# Patient Record
Sex: FEMALE | Race: WHITE | NOT HISPANIC OR LATINO | Employment: FULL TIME | URBAN - METROPOLITAN AREA
[De-identification: names, ages, dates, MRNs, and addresses within clinical notes are randomized per-mention and may not be internally consistent; named-entity substitution may affect disease eponyms.]

---

## 2017-01-09 ENCOUNTER — OFFICE VISIT (OUTPATIENT)
Dept: OBGYN | Facility: CLINIC | Age: 37
End: 2017-01-09
Payer: COMMERCIAL

## 2017-01-09 VITALS — DIASTOLIC BLOOD PRESSURE: 80 MMHG | SYSTOLIC BLOOD PRESSURE: 124 MMHG | TEMPERATURE: 98.5 F | HEIGHT: 67 IN

## 2017-01-09 DIAGNOSIS — B97.7 HIGH RISK HPV INFECTION: Primary | ICD-10-CM

## 2017-01-09 PROCEDURE — 57452 EXAM OF CERVIX W/SCOPE: CPT | Performed by: OBSTETRICS & GYNECOLOGY

## 2017-01-09 NOTE — PROGRESS NOTES
Tati Hamilton is a 36 year old female who presents for repeat colposcopy.   Normal pap, but positive HPV    Past Medical History   Diagnosis Date     HTN, goal below 140/90      Depressive disorder, not elsewhere classified      Calculus of kidney      Multiple sclerosis (H) Dx in 2001     Lung nodules May 2010     Needs f/u CT Nov 2010     ESTEPHANIA (generalised anxiety disorder)      Vitamin D deficiencies      PONV (postoperative nausea and vomiting)      High risk HPV infection 3/20/15,10/17/16     Not HPV 16 or 18, HR, Not 16 or 18     Family History   Problem Relation Age of Onset     Hypertension Father      Prostate Cancer Maternal Grandfather      CEREBROVASCULAR DISEASE Maternal Grandmother      HEART DISEASE Paternal Grandfather      Gallbladder Disease Mother      Other - See Comments Mother      varicose veins             Patient's last menstrual period was 12/23/2016.      History   Smoking status     Current Every Day Smoker -- 0.50 packs/day for 4 years     Types: Cigarettes     Last Attempt to Quit: 04/28/2007   Smokeless tobacco     Never Used     Comment: socially       Allergies as of 01/09/2017 - reviewed 01/09/2017   Allergen Reaction Noted     No known drug allergies  09/20/2002       PROCEDURE:  Before the procedure, it was ensured that the patient was educated regarding the nature of her findings to date, the implications of them, and what was to be done. She has been made aware of the role of HPV, the natural history of infection, ways to minimize her future risk, the effect of HPV on the cervix, and treatment options available should they be indicated. The pathophysiology of the cervix, including a discussion of squamous vs. endometrial cells, and squamous metaplasia have all been reviewed, using illustrations and sketches. The details of the colposcopic procedure were reviewed, as well as the risks of missed diagnoses, pain, infection and bleeding. All questions were answered before  proceeding, and informed consent was therefore obtained.      Pap repeated?:  No  SCJ seen?:  yes  Endocervical speculum needed?:  No  ECC done?:  No  EndoPap done?:  NO  Lugol's solution used?:  No  Satisfactory examination?:  yes    Vaginal vault: normal to cursory inspection   Urethra normal?:  yes  Labia normal?:  yes  Perineum normal?:  yes  Rectum normal?:  yes    FINDINGS:  Please see image   Cervix: no visible lesions  Procedure: biopsies taken (not including ECC): 0.    Procedure summary: Patient tolerated procedure well     Assessment: Normal exam without visible pathology      Plan: repeat pap smear in 6-12 months

## 2017-01-09 NOTE — NURSING NOTE
"Chief Complaint   Patient presents with     Colposcopy       Initial /80 mmHg  Temp(Src) 98.5  F (36.9  C) (Oral)  Ht 5' 7\" (1.702 m)  LMP 12/23/2016 Estimated body mass index is 29.12 kg/(m^2) as calculated from the following:    Height as of this encounter: 5' 7\" (1.702 m).    Weight as of 10/26/16: 186 lb (84.369 kg).  BP completed using cuff size: valery Kimbrough CMA      "

## 2017-02-14 DIAGNOSIS — F41.1 GENERALIZED ANXIETY DISORDER: Primary | ICD-10-CM

## 2017-02-14 RX ORDER — SERTRALINE HYDROCHLORIDE 100 MG/1
100 TABLET, FILM COATED ORAL DAILY
Qty: 90 TABLET | Refills: 0 | Status: SHIPPED | OUTPATIENT
Start: 2017-02-14 | End: 2017-10-19

## 2017-02-14 NOTE — TELEPHONE ENCOUNTER
Pt calling for refill on Zoloft.  Asking for 100mg tabs take 1 tab daily (chart states 50mg tabs take 2 tabs daily).    Zoloft     Last Written Prescription Date: 10-17-16  Last Fill Quantity: 90, # refills: 1  Last Office Visit with [facility] care provider:  6-17-16        Last PHQ-9 score on record=   PHQ-9 SCORE 3/18/2014   Total Score 2       Medication is being filled for 1 time refill only due to:  Patient needs to be seen because due for 6 mo f/u..     Pt informed due for OV.

## 2017-04-25 ENCOUNTER — TELEPHONE (OUTPATIENT)
Dept: INTERNAL MEDICINE | Facility: CLINIC | Age: 37
End: 2017-04-25

## 2017-04-25 DIAGNOSIS — Z00.00 ENCOUNTER FOR ROUTINE ADULT HEALTH EXAMINATION: ICD-10-CM

## 2017-04-25 DIAGNOSIS — E78.5 HYPERLIPIDEMIA LDL GOAL <100: ICD-10-CM

## 2017-04-25 DIAGNOSIS — I10 HTN, GOAL BELOW 140/90: Primary | ICD-10-CM

## 2017-04-25 DIAGNOSIS — E55.9 VITAMIN D DEFICIENCY: ICD-10-CM

## 2017-04-25 NOTE — TELEPHONE ENCOUNTER
Per Jose-enter orders for-      CBC  CMP  FLP direct LDL  TSH/T4   Microalbumin  Vit D    Orders entered-Pt aware

## 2017-05-02 DIAGNOSIS — I10 HTN, GOAL BELOW 140/90: ICD-10-CM

## 2017-05-02 RX ORDER — ATENOLOL 25 MG/1
25 TABLET ORAL DAILY
Qty: 90 TABLET | Refills: 0 | Status: SHIPPED | OUTPATIENT
Start: 2017-05-02 | End: 2017-08-04

## 2017-05-02 NOTE — TELEPHONE ENCOUNTER
Pt requesting refill on Atenolol      Last OV-6/17/16      Pt aware she is due for labs, and due for June appt

## 2017-06-25 DIAGNOSIS — E55.9 VITAMIN D DEFICIENCY: ICD-10-CM

## 2017-06-26 RX ORDER — ERGOCALCIFEROL 1.25 MG/1
CAPSULE, LIQUID FILLED ORAL
Qty: 12 CAPSULE | Refills: 2 | OUTPATIENT
Start: 2017-06-26

## 2017-07-13 ENCOUNTER — TELEPHONE (OUTPATIENT)
Dept: INTERNAL MEDICINE | Facility: CLINIC | Age: 37
End: 2017-07-13

## 2017-07-13 DIAGNOSIS — E66.3 OVERWEIGHT: Primary | ICD-10-CM

## 2017-07-13 NOTE — TELEPHONE ENCOUNTER
Pt calling.  Asking for rx for Topamax for weight loss.    Last OV 6-17-16    Please advise, thanks.

## 2017-07-14 RX ORDER — TOPIRAMATE 25 MG/1
TABLET, FILM COATED ORAL
Qty: 90 TABLET | Refills: 1 | Status: SHIPPED | OUTPATIENT
Start: 2017-07-14 | End: 2017-08-15 | Stop reason: DRUGHIGH

## 2017-08-04 DIAGNOSIS — I10 HTN, GOAL BELOW 140/90: ICD-10-CM

## 2017-08-04 DIAGNOSIS — Z00.00 ENCOUNTER FOR ROUTINE ADULT HEALTH EXAMINATION: ICD-10-CM

## 2017-08-04 DIAGNOSIS — E78.5 HYPERLIPIDEMIA LDL GOAL <100: ICD-10-CM

## 2017-08-04 DIAGNOSIS — E55.9 VITAMIN D DEFICIENCY: ICD-10-CM

## 2017-08-04 LAB
ALBUMIN SERPL-MCNC: 3.5 G/DL (ref 3.4–5)
ALP SERPL-CCNC: 98 U/L (ref 40–150)
ALT SERPL W P-5'-P-CCNC: 24 U/L (ref 0–50)
ANION GAP SERPL CALCULATED.3IONS-SCNC: 8 MMOL/L (ref 3–14)
AST SERPL W P-5'-P-CCNC: 14 U/L (ref 0–45)
BILIRUB SERPL-MCNC: 0.3 MG/DL (ref 0.2–1.3)
BUN SERPL-MCNC: 10 MG/DL (ref 7–30)
CALCIUM SERPL-MCNC: 9.3 MG/DL (ref 8.5–10.1)
CHLORIDE SERPL-SCNC: 106 MMOL/L (ref 94–109)
CHOLEST SERPL-MCNC: 178 MG/DL
CO2 SERPL-SCNC: 22 MMOL/L (ref 20–32)
CREAT SERPL-MCNC: 0.79 MG/DL (ref 0.52–1.04)
CREAT UR-MCNC: 144 MG/DL
ERYTHROCYTE [DISTWIDTH] IN BLOOD BY AUTOMATED COUNT: 15 % (ref 10–15)
GFR SERPL CREATININE-BSD FRML MDRD: 82 ML/MIN/1.7M2
GLUCOSE SERPL-MCNC: 111 MG/DL (ref 70–99)
HCT VFR BLD AUTO: 35.8 % (ref 35–47)
HDLC SERPL-MCNC: 51 MG/DL
HGB BLD-MCNC: 11.2 G/DL (ref 11.7–15.7)
LDLC SERPL CALC-MCNC: 79 MG/DL
MCH RBC QN AUTO: 28.4 PG (ref 26.5–33)
MCHC RBC AUTO-ENTMCNC: 31.3 G/DL (ref 31.5–36.5)
MCV RBC AUTO: 91 FL (ref 78–100)
MICROALBUMIN UR-MCNC: 7 MG/L
MICROALBUMIN/CREAT UR: 4.78 MG/G CR (ref 0–25)
NONHDLC SERPL-MCNC: 127 MG/DL
PLATELET # BLD AUTO: 298 10E9/L (ref 150–450)
POTASSIUM SERPL-SCNC: 3.7 MMOL/L (ref 3.4–5.3)
PROT SERPL-MCNC: 7.5 G/DL (ref 6.8–8.8)
RBC # BLD AUTO: 3.94 10E12/L (ref 3.8–5.2)
SODIUM SERPL-SCNC: 136 MMOL/L (ref 133–144)
TRIGL SERPL-MCNC: 239 MG/DL
TSH SERPL DL<=0.005 MIU/L-ACNC: 1.8 MU/L (ref 0.4–4)
WBC # BLD AUTO: 6.5 10E9/L (ref 4–11)

## 2017-08-04 PROCEDURE — 82043 UR ALBUMIN QUANTITATIVE: CPT | Performed by: INTERNAL MEDICINE

## 2017-08-04 PROCEDURE — 82306 VITAMIN D 25 HYDROXY: CPT | Mod: GZ | Performed by: INTERNAL MEDICINE

## 2017-08-04 PROCEDURE — 80053 COMPREHEN METABOLIC PANEL: CPT | Performed by: INTERNAL MEDICINE

## 2017-08-04 PROCEDURE — 85027 COMPLETE CBC AUTOMATED: CPT | Performed by: INTERNAL MEDICINE

## 2017-08-04 PROCEDURE — 84443 ASSAY THYROID STIM HORMONE: CPT | Performed by: INTERNAL MEDICINE

## 2017-08-04 PROCEDURE — 80061 LIPID PANEL: CPT | Performed by: INTERNAL MEDICINE

## 2017-08-04 PROCEDURE — 36415 COLL VENOUS BLD VENIPUNCTURE: CPT | Performed by: INTERNAL MEDICINE

## 2017-08-04 RX ORDER — ATENOLOL 25 MG/1
25 TABLET ORAL DAILY
Qty: 90 TABLET | Refills: 0 | Status: SHIPPED | OUTPATIENT
Start: 2017-08-04 | End: 2017-08-15

## 2017-08-04 NOTE — TELEPHONE ENCOUNTER
Atenolol      Last Written Prescription Date: 5-2-17  Last Fill Quantity: 90, # refills: 0    Last Office Visit with G, P or [facility] prescribing provider:  6-17-16   Future Office Visit:        BP Readings from Last 3 Encounters:   01/09/17 124/80   10/26/16 (!) 145/96   10/17/16 122/88       Pt will be out of med soon.     Medication is being filled for 1 time refill only due to:  pending lab results.

## 2017-08-07 LAB — DEPRECATED CALCIDIOL+CALCIFEROL SERPL-MC: 21 UG/L (ref 20–75)

## 2017-08-15 ENCOUNTER — OFFICE VISIT (OUTPATIENT)
Dept: INTERNAL MEDICINE | Facility: CLINIC | Age: 37
End: 2017-08-15
Payer: COMMERCIAL

## 2017-08-15 VITALS
TEMPERATURE: 98.7 F | DIASTOLIC BLOOD PRESSURE: 70 MMHG | HEIGHT: 67 IN | SYSTOLIC BLOOD PRESSURE: 110 MMHG | WEIGHT: 183.2 LBS | BODY MASS INDEX: 28.75 KG/M2 | HEART RATE: 90 BPM | OXYGEN SATURATION: 98 %

## 2017-08-15 DIAGNOSIS — R73.9 BLOOD SUGAR INCREASED: ICD-10-CM

## 2017-08-15 DIAGNOSIS — E66.3 OVERWEIGHT: ICD-10-CM

## 2017-08-15 DIAGNOSIS — E55.9 VITAMIN D DEFICIENCY: ICD-10-CM

## 2017-08-15 DIAGNOSIS — D64.9 ANEMIA, UNSPECIFIED TYPE: ICD-10-CM

## 2017-08-15 DIAGNOSIS — I10 HTN, GOAL BELOW 140/90: Primary | ICD-10-CM

## 2017-08-15 PROCEDURE — 99214 OFFICE O/P EST MOD 30 MIN: CPT | Performed by: INTERNAL MEDICINE

## 2017-08-15 RX ORDER — ERGOCALCIFEROL 1.25 MG/1
50000 CAPSULE, LIQUID FILLED ORAL WEEKLY
Qty: 12 CAPSULE | Refills: 1 | Status: SHIPPED | OUTPATIENT
Start: 2017-08-15 | End: 2020-05-28

## 2017-08-15 RX ORDER — LISINOPRIL 20 MG/1
20 TABLET ORAL DAILY
Qty: 90 TABLET | Refills: 3 | Status: SHIPPED | OUTPATIENT
Start: 2017-08-15 | End: 2018-09-06

## 2017-08-15 RX ORDER — ATENOLOL 25 MG/1
25 TABLET ORAL DAILY
Qty: 90 TABLET | Refills: 1 | Status: SHIPPED | OUTPATIENT
Start: 2017-08-15 | End: 2018-05-15

## 2017-08-15 RX ORDER — TOPIRAMATE 25 MG/1
50 TABLET, FILM COATED ORAL 2 TIMES DAILY
Qty: 360 TABLET | Refills: 1 | Status: SHIPPED | OUTPATIENT
Start: 2017-08-15 | End: 2018-03-28

## 2017-08-15 ASSESSMENT — ANXIETY QUESTIONNAIRES
3. WORRYING TOO MUCH ABOUT DIFFERENT THINGS: SEVERAL DAYS
GAD7 TOTAL SCORE: 8
7. FEELING AFRAID AS IF SOMETHING AWFUL MIGHT HAPPEN: SEVERAL DAYS
6. BECOMING EASILY ANNOYED OR IRRITABLE: MORE THAN HALF THE DAYS
5. BEING SO RESTLESS THAT IT IS HARD TO SIT STILL: SEVERAL DAYS
1. FEELING NERVOUS, ANXIOUS, OR ON EDGE: SEVERAL DAYS
2. NOT BEING ABLE TO STOP OR CONTROL WORRYING: SEVERAL DAYS

## 2017-08-15 ASSESSMENT — PATIENT HEALTH QUESTIONNAIRE - PHQ9: 5. POOR APPETITE OR OVEREATING: SEVERAL DAYS

## 2017-08-15 NOTE — PROGRESS NOTES
Patient's instructions / PLAN:                                                        Plan:  1. Vitamin D 26366 units weekly   2. topamax 50 mg twice a day  3. Diet and exercise   4. Please make a lab appointment for fasting labs in Feb   5. Please make an appointment few days after the labs to discuss about the results.   6. Iron ( Vitron) take 1 tab twice a week        ASSESSMENT & PLAN:                                                      (I10) HTN, goal below 140/90  (primary encounter diagnosis)  Comment: Controlled    Plan: vitamin D (ERGOCALCIFEROL) 24953 UNIT capsule,         topiramate (TOPAMAX) 25 MG tablet, lisinopril         (PRINIVIL/ZESTRIL) 20 MG tablet, atenolol         (TENORMIN) 25 MG tablet, Hemoglobin A1c,         Comprehensive metabolic panel, CBC with         platelets, Lipid panel reflex to direct LDL,         Vitamin D Deficiency        Continue same meds, same doses for now     (E66.3) Overweight  Comment: tolerates Topamax   Plan: vitamin D (ERGOCALCIFEROL) 94893 UNIT capsule,         topiramate (TOPAMAX) 25 MG tablet, lisinopril         (PRINIVIL/ZESTRIL) 20 MG tablet, atenolol         (TENORMIN) 25 MG tablet, Hemoglobin A1c,         Comprehensive metabolic panel, CBC with         platelets, Lipid panel reflex to direct LDL,         Vitamin D Deficiency            (E55.9) Vitamin D deficiency  Comment:   Plan: vitamin D (ERGOCALCIFEROL) 99990 UNIT capsule,         topiramate (TOPAMAX) 25 MG tablet, lisinopril         (PRINIVIL/ZESTRIL) 20 MG tablet, atenolol         (TENORMIN) 25 MG tablet, Hemoglobin A1c,         Comprehensive metabolic panel, CBC with         platelets, Lipid panel reflex to direct LDL,         Vitamin D Deficiency            (R73.09) Blood sugar increased  Comment:   Plan: vitamin D (ERGOCALCIFEROL) 58310 UNIT capsule,         topiramate (TOPAMAX) 25 MG tablet, lisinopril         (PRINIVIL/ZESTRIL) 20 MG tablet, atenolol         (TENORMIN) 25 MG tablet, Hemoglobin A1c,  "        Comprehensive metabolic panel, CBC with         platelets, Lipid panel reflex to direct LDL,         Vitamin D Deficiency        Diet and exercise     (D64.9) Anemia, unspecified type  Comment: sec heavy periods   Plan: Iron and iron binding capacity, Ferritin,         Vitamin B12, Folate        as above        Chief Complaint:                                                      Follow up chronic medical problems        SUBJECTIVE:                                                    History of present illness     No side effects from Topamax. Helps with the weight.    Mild anemia - heavy periods    High glucose & TG - diet and exercise     Low vit D - resume 32459     HTN - Controlled      ROS:                                                      ROS: negative for fever, chills, cough, wheezes, chest pain, shortness of breath, vomiting, abdominal pain, leg swelling     A 10-point review of systems was obtained.  Those pertinent are above and in the in the Subjective section.  The rest of the systems are negative.        OBJECTIVE:                                                    Physical Exam :    Blood pressure 110/70, pulse 90, temperature 98.7  F (37.1  C), temperature source Oral, height 5' 7\" (1.702 m), weight 183 lb 3.2 oz (83.1 kg), SpO2 98 %, not currently breastfeeding.   NAD, appears comfortable  Skin: no rashes   HEENT: PERRLA, EOMI, pink conjunctiva, anicteric sclerae, bilateral tympanic membranes are clinically normal, oropharynx is normal color  Neck: supple, no JVD,  No thyroidmegaly. Lymph nodes nonpalpable cervical and supraclavicular.  Chest: clear to auscultation bilaterally, good respiratory effort  Heart: S1 S2, RRR, no mgr appreciated  Abdomen: soft, not tender, no hepatosplenomegaly or masses appreciated, no abdominal bruit, present bowel sounds  Extremities: no edema,   Neurologic: A, Ox3, no focal signs appreciated    PMHx: reviewed  Past Medical History:   Diagnosis Date     " Calculus of kidney      Depressive disorder, not elsewhere classified      ESTEPHANIA (generalised anxiety disorder)      High risk HPV infection 3/20/15,10/17/16    Not HPV 16 or 18, HR, Not 16 or 18     HTN, goal below 140/90      Lung nodules May 2010    Needs f/u CT Nov 2010     Multiple sclerosis (H) Dx in 2001     PONV (postoperative nausea and vomiting)      Vitamin D deficiencies       PSHx: reviewed  Past Surgical History:   Procedure Laterality Date     C NONSPECIFIC PROCEDURE      2 procedures for kidney stones     HC COLP CERVIX/UPPER VAGINA W LOOP ELEC BX CERVIX       HC REMOVAL OF TONSILS,<11 Y/O      Tonsils <12y.o.     LAPAROSCOPIC CHOLECYSTECTOMY  5/8/2014    Procedure: LAPAROSCOPIC CHOLECYSTECTOMY;  Surgeon: Sona Giraldo MD;  Location:  OR        Meds: reviewed  Current Outpatient Prescriptions   Medication Sig Dispense Refill     atenolol (TENORMIN) 25 MG tablet Take 1 tablet (25 mg) by mouth daily 90 tablet 0     topiramate (TOPAMAX) 25 MG tablet One tablet daily for two weeks, then two tablets daily for two weeks, then three tablets daily 90 tablet 1     sertraline (ZOLOFT) 100 MG tablet Take 1 tablet (100 mg) by mouth daily 90 tablet 0     lisinopril (PRINIVIL,ZESTRIL) 20 MG tablet Take 1 tablet (20 mg) by mouth daily 90 tablet 3     omeprazole (PRILOSEC) 40 MG capsule Take 1 capsule (40 mg) by mouth daily Take 30-60 minutes before a meal. 90 capsule 1     order for DME Equipment being ordered: [employer] DREAM STATION WITH HH AND MIRAGE FX FOR HER NASAL MASK WITH CHINSTRAP.  PRESSURE 7-15 CM.       [DISCONTINUED] sertraline (ZOLOFT) 50 MG tablet Take 2 tablets (100 mg) by mouth daily 90 tablet 1       Soc Hx: reviewed  Fam Hx: reviewed          Gely Garcia MD  Internal Medicine

## 2017-08-15 NOTE — NURSING NOTE
"No chief complaint on file.      Initial /70 (BP Location: Left arm, Patient Position: Chair, Cuff Size: Adult Large)  Pulse 90  Temp 98.7  F (37.1  C) (Oral)  Ht 5' 7\" (1.702 m)  Wt 183 lb 3.2 oz (83.1 kg)  SpO2 98%  Breastfeeding? No  BMI 28.69 kg/m2 Estimated body mass index is 28.69 kg/(m^2) as calculated from the following:    Height as of this encounter: 5' 7\" (1.702 m).    Weight as of this encounter: 183 lb 3.2 oz (83.1 kg).  Medication Reconciliation: complete   Cinthia Quintana CMA      "

## 2017-08-15 NOTE — PATIENT INSTRUCTIONS
Plan:  1. Vitamin D 12936 units weekly   2. topamax 50 mg twice a day  3. Diet and exercise   4. Please make a lab appointment for fasting labs in Feb   5. Please make an appointment few days after the labs to discuss about the results.   6. Iron ( Vitron) take 1 tab twice a week

## 2017-08-15 NOTE — MR AVS SNAPSHOT
After Visit Summary   8/15/2017    Tati Hamilton    MRN: [id number]           Patient Information     Date Of Birth          1980        Visit Information        Provider Department      8/15/2017 4:20 PM Gely Le MD [facility]        Today's Diagnoses     HTN, goal below 140/90    -  1    Overweight        Vitamin D deficiency        Blood sugar increased        Anemia, unspecified type          Care Instructions    Plan:  1. Vitamin D 56333 units weekly   2. topamax 50 mg twice a day  3. Diet and exercise   4. Please make a lab appointment for fasting labs in Feb   5. Please make an appointment few days after the labs to discuss about the results.   6. Iron ( Vitron) take 1 tab twice a week          Follow-ups after your visit        Future tests that were ordered for you today     Open Future Orders        Priority Expected Expires Ordered    Iron and iron binding capacity Routine  8/15/2018 8/15/2017    Ferritin Routine  8/15/2018 8/15/2017    Vitamin B12 Routine  8/15/2018 8/15/2017    Folate Routine  8/15/2018 8/15/2017    CBC with platelets Routine  8/15/2018 8/15/2017    Lipid panel reflex to direct LDL Routine  8/15/2018 8/15/2017    Vitamin D Deficiency Routine  8/15/2018 8/15/2017    Hemoglobin A1c Routine  8/15/2018 8/15/2017    Comprehensive metabolic panel Routine  8/15/2018 8/15/2017            Who to contact     If you have questions or need follow up information about today's clinic visit or your schedule please contact [facility] directly at [phone].  Normal or non-critical lab and imaging results will be communicated to you by MyChart, letter or phone within 4 business days after the clinic has received the results. If you do not hear from us within 7 days, please contact the clinic through MyChart or phone. If you have a critical or abnormal lab result, we will notify you by phone as soon as possible.  Submit  "refill requests through [employer] or call your pharmacy and they will forward the refill request to us. Please allow 3 business days for your refill to be completed.          Additional Information About Your Visit        [employer] Information     [employer] gives you secure access to your electronic health record. If you see a primary care provider, you can also send messages to your care team and make appointments. If you have questions, please call your primary care clinic.  If you do not have a primary care provider, please call [phone] and they will assist you.        Care EveryWhere ID     This is your Care EveryWhere ID. This could be used by other organizations to access your [facility] medical records  [id number]        Your Vitals Were     Pulse Temperature Height Pulse Oximetry Breastfeeding? BMI (Body Mass Index)    90 98.7  F (37.1  C) (Oral) 5' 7\" (1.702 m) 98% No 28.69 kg/m2       Blood Pressure from Last 3 Encounters:   08/15/17 110/70   01/09/17 124/80   10/26/16 (!) 145/96    Weight from Last 3 Encounters:   08/15/17 183 lb 3.2 oz (83.1 kg)   10/26/16 186 lb (84.4 kg)   10/17/16 182 lb (82.6 kg)                 Today's Medication Changes          These changes are accurate as of: 8/15/17  5:03 PM.  If you have any questions, ask your nurse or doctor.               These medicines have changed or have updated prescriptions.        Dose/Directions    sertraline 100 MG tablet   Commonly known as:  ZOLOFT   This may have changed:  Another medication with the same name was removed. Continue taking this medication, and follow the directions you see here.   Used for:  Generalized anxiety disorder        Dose:  100 mg   Take 1 tablet (100 mg) by mouth daily   Quantity:  90 tablet   Refills:  0       topiramate 25 MG tablet   Commonly known as:  TOPAMAX   This may have changed:    - how much to take  - how to take this  - when to take this  - additional instructions   Used for:  Overweight, Vitamin D " deficiency, HTN, goal below 140/90, Blood sugar increased        Dose:  50 mg   Take 2 tablets (50 mg) by mouth 2 times daily   Quantity:  360 tablet   Refills:  1            Where to get your medicines      These medications were sent to [facility] [place], [place] - [address]  [address] [facility] [address]     Phone:  [phone]     atenolol 25 MG tablet    lisinopril 20 MG tablet    topiramate 25 MG tablet    vitamin D 41797 UNIT capsule                Primary Care Provider Office Phone # Fax #    Gely Le [phone]       [address]  [facility] [address]        Equal Access to Services     GILBERTO HARRINGTON : Hadii nilton oliver hadasho Soomaali, waaxda luqadaha, qaybta kaalmada adeegyada, waxay cindyin haykatelyn ellison . So [facility] [phone].    ATENCIÓN: Si habla español, tiene a acosta disposición servicios gratuitos de asistencia lingüística. [facility] [phone].    We comply with applicable federal civil rights laws and [place] laws. We do not discriminate on the basis of race, color, national origin, age, disability sex, sexual orientation or gender identity.            Thank you!     Thank you for choosing [facility]  for your care. Our goal is always to provide you with excellent care. Hearing back from our patients is one way we can continue to improve our services. Please take a few minutes to complete the written survey that you may receive in the mail after your visit with us. Thank you!             Your Updated Medication List - Protect others around you: Learn how to safely use, store and throw away your medicines at www.disposemymeds.org.          This list is accurate as of: 8/15/17  5:03 PM.  Always use your most recent med list.                   Brand Name Dispense Instructions for use Diagnosis    atenolol 25 MG tablet    TENORMIN    90 tablet    Take 1 tablet (25 mg) by mouth daily     HTN, goal below 140/90, Overweight, Vitamin D deficiency, Blood sugar increased       lisinopril 20 MG tablet    PRINIVIL/ZESTRIL    90 tablet    Take 1 tablet (20 mg) by mouth daily    Overweight, Vitamin D deficiency, HTN, goal below 140/90, Blood sugar increased       omeprazole 40 MG capsule    priLOSEC    90 capsule    Take 1 capsule (40 mg) by mouth daily Take 30-60 minutes before a meal.    Esophageal reflux       order for DME      Equipment being ordered: [employer] DREAM STATION WITH HH AND MIRAGE FX FOR HER NASAL MASK WITH CHINSTRAP.  PRESSURE 7-15 CM.        sertraline 100 MG tablet    ZOLOFT    90 tablet    Take 1 tablet (100 mg) by mouth daily    Generalized anxiety disorder       topiramate 25 MG tablet    TOPAMAX    360 tablet    Take 2 tablets (50 mg) by mouth 2 times daily    Overweight, Vitamin D deficiency, HTN, goal below 140/90, Blood sugar increased       vitamin D 55505 UNIT capsule    ERGOCALCIFEROL    12 capsule    Take 1 capsule (50,000 Units) by mouth once a week    Vitamin D deficiency, Overweight, HTN, goal below 140/90, Blood sugar increased

## 2017-08-16 ASSESSMENT — ANXIETY QUESTIONNAIRES: GAD7 TOTAL SCORE: 8

## 2017-09-15 RX ORDER — LORAZEPAM 0.5 MG/1
TABLET ORAL
Qty: 30 TABLET | Refills: 0 | COMMUNITY
Start: 2017-09-15 | End: 2020-02-06

## 2017-09-15 NOTE — TELEPHONE ENCOUNTER
Pt calling for refill on:    Lorazepam      Last Written Prescription Date:  2-23-07  Last Fill Quantity: 20,   # refills: 0  Last Office Visit with G, P or [facility] prescribing provider: 8-15-17  Future Office visit:       VO per Dr. Garcia, ok for #30 tabs.    Called pharm and spoke with Fely--pharmacist and gave refill info.

## 2017-10-16 ENCOUNTER — TELEPHONE (OUTPATIENT)
Dept: INTERNAL MEDICINE | Facility: CLINIC | Age: 37
End: 2017-10-16

## 2017-10-16 DIAGNOSIS — J06.9 UPPER RESPIRATORY TRACT INFECTION, UNSPECIFIED TYPE: Primary | ICD-10-CM

## 2017-10-16 RX ORDER — AZITHROMYCIN 250 MG/1
TABLET, FILM COATED ORAL
Qty: 6 TABLET | Refills: 0 | Status: SHIPPED | OUTPATIENT
Start: 2017-10-16 | End: 2017-10-19

## 2017-10-16 NOTE — TELEPHONE ENCOUNTER
Pt called. Stated that she is going on 2wks of coughing, chest congestion and greenish nasal d/c. Requesting a abx.

## 2017-10-19 ENCOUNTER — OFFICE VISIT (OUTPATIENT)
Dept: INTERNAL MEDICINE | Facility: CLINIC | Age: 37
End: 2017-10-19
Payer: COMMERCIAL

## 2017-10-19 VITALS
OXYGEN SATURATION: 98 % | DIASTOLIC BLOOD PRESSURE: 78 MMHG | HEART RATE: 94 BPM | BODY MASS INDEX: 29.47 KG/M2 | SYSTOLIC BLOOD PRESSURE: 118 MMHG | TEMPERATURE: 98.2 F | HEIGHT: 67 IN | WEIGHT: 187.8 LBS

## 2017-10-19 DIAGNOSIS — R10.32 ABDOMINAL PAIN, LEFT LOWER QUADRANT: Primary | ICD-10-CM

## 2017-10-19 DIAGNOSIS — I10 HTN, GOAL BELOW 140/90: ICD-10-CM

## 2017-10-19 DIAGNOSIS — F41.1 GENERALIZED ANXIETY DISORDER: ICD-10-CM

## 2017-10-19 DIAGNOSIS — G35 MULTIPLE SCLEROSIS (H): ICD-10-CM

## 2017-10-19 PROCEDURE — 99213 OFFICE O/P EST LOW 20 MIN: CPT | Performed by: INTERNAL MEDICINE

## 2017-10-19 RX ORDER — SERTRALINE HYDROCHLORIDE 100 MG/1
100 TABLET, FILM COATED ORAL DAILY
Qty: 90 TABLET | Refills: 1 | Status: SHIPPED | OUTPATIENT
Start: 2017-10-19 | End: 2018-03-28

## 2017-10-19 NOTE — MR AVS SNAPSHOT
After Visit Summary   10/19/2017    Tati Hamilton    MRN: [id number]           Patient Information     Date Of Birth          1980        Visit Information        Provider Department      10/19/2017 10:40 AM Gely Le MD [facility]        Today's Diagnoses     Abdominal pain, left lower quadrant    -  1    Generalized anxiety disorder        HTN, goal below 140/90        Multiple sclerosis (H)           Follow-ups after your visit        Your next 10 appointments already scheduled     Oct 23, 2017 10:45 AM CDT   CT ABDOMEN PELVIS W CONTRAST with [id number]   [facility])    [address]  [facility] [address] [phone]           Please bring any scans or X-rays taken at other hospitals, if similar tests were done. Also bring a list of your medicines, including vitamins, minerals and over-the-counter drugs. It is safest to leave personal items at home.  Be sure to tell your doctor:   If you have any allergies.   If there s any chance you are pregnant.   If you are breastfeeding.   If you have any special needs.  You may have contrast for this exam. To prepare:   Do not eat or drink for 2 hours before your exam. If you need to take medicine, you may take it with small sips of water. (We may ask you to take liquid medicine as well.)   The day before your exam, drink extra fluids at least six 8-ounce glasses (unless your doctor tells you to restrict your fluids).  Patients over 70 or patients with diabetes or kidney problems:   If you haven t had a blood test (creatinine test) within the last 30 days, go to your clinic or Diagnostic Imaging Department for this test.  If you have diabetes:   If your kidney function is normal, continue taking your metformin (Avandamet, Glucophage, Glucovance, Metaglip) on the day of your exam.   If your kidney function is abnormal, wait 48 hours  "before restarting this medicine.  You will have oral contrast for this exam:   You will drink the contrast at home. Get this from your clinic or Diagnostic Imaging Department. Please follow the directions given.  Please wear loose clothing, such as a sweat suit or jogging clothes. Avoid snaps, zippers and other metal. We may ask you to undress and put on a hospital gown.  If you have any questions, please call the Imaging Department where you will have your exam.              Who to contact     If you have questions or need follow up information about today's clinic visit or your schedule please contact [facility] directly at [phone].  Normal or non-critical lab and imaging results will be communicated to you by Sail[employer], letter or phone within 4 business days after the clinic has received the results. If you do not hear from us within 7 days, please contact the clinic through [employer] or phone. If you have a critical or abnormal lab result, we will notify you by phone as soon as possible.  Submit refill requests through [employer] or call your pharmacy and they will forward the refill request to us. Please allow 3 business days for your refill to be completed.          Additional Information About Your Visit        [employer] Information     [employer] gives you secure access to your electronic health record. If you see a primary care provider, you can also send messages to your care team and make appointments. If you have questions, please call your primary care clinic.  If you do not have a primary care provider, please call [phone] and they will assist you.        Care EveryWhere ID     This is your Care EveryWhere ID. This could be used by other organizations to access your [facility] medical records  [id number]        Your Vitals Were     Pulse Temperature Height Pulse Oximetry Breastfeeding? BMI (Body Mass Index)    94 98.2  F (36.8  C) (Oral) 5' 7\" (1.702 m) 98% No 29.41 kg/m2       Blood " Pressure from Last 3 Encounters:   10/19/17 118/78   08/15/17 110/70   01/09/17 124/80    Weight from Last 3 Encounters:   10/19/17 187 lb 12.8 oz (85.2 kg)   08/15/17 183 lb 3.2 oz (83.1 kg)   10/26/16 186 lb (84.4 kg)                 Where to get your medicines      These medications were sent to [facility] [address]  [address] [facility] [address]     Phone:  [phone]     sertraline 100 MG tablet          Primary Care Provider Office Phone # Fax #    Gely Le [phone]       [address]  [facility] [address]        Equal Access to Services     CARLO HARRINGTON : Hadii nilton oliver hadasho Soomaali, waaxda luqadaha, qaybta kaalmada adeegyada, laura ellison . So [facility] [phone].    ATENCIÓN: Si habla español, tiene a acosta disposición servicios gratuitos de asistencia lingüística. Shahram al [phone].    We comply with applicable federal civil rights laws and [place] laws. We do not discriminate on the basis of race, color, national origin, age, disability, sex, sexual orientation, or gender identity.            Thank you!     Thank you for choosing [facility]  for your care. Our goal is always to provide you with excellent care. Hearing back from our patients is one way we can continue to improve our services. Please take a few minutes to complete the written survey that you may receive in the mail after your visit with us. Thank you!             Your Updated Medication List - Protect others around you: Learn how to safely use, store and throw away your medicines at www.disposemymeds.org.          This list is accurate as of: 10/19/17 11:59 PM.  Always use your most recent med list.                   Brand Name Dispense Instructions for use Diagnosis    atenolol 25 MG tablet    TENORMIN    90 tablet    Take 1 tablet (25 mg) by mouth daily    HTN, goal below 140/90,  Overweight, Vitamin D deficiency, Blood sugar increased       lisinopril 20 MG tablet    PRINIVIL/ZESTRIL    90 tablet    Take 1 tablet (20 mg) by mouth daily    Overweight, Vitamin D deficiency, HTN, goal below 140/90, Blood sugar increased       LORazepam 0.5 MG tablet    ATIVAN    30 tablet    1 po  qd prn anxiety        omeprazole 40 MG capsule    priLOSEC    90 capsule    Take 1 capsule (40 mg) by mouth daily Take 30-60 minutes before a meal.    Esophageal reflux       order for DME      Equipment being ordered: [employer] DREAM STATION WITH HH AND [employer] FX FOR HER NASAL MASK WITH CHINSTRAP.  PRESSURE 7-15 CM.        sertraline 100 MG tablet    ZOLOFT    90 tablet    Take 1 tablet (100 mg) by mouth daily    Generalized anxiety disorder       topiramate 25 MG tablet    TOPAMAX    360 tablet    Take 2 tablets (50 mg) by mouth 2 times daily    Overweight, Vitamin D deficiency, HTN, goal below 140/90, Blood sugar increased       vitamin D 49833 UNIT capsule    ERGOCALCIFEROL    12 capsule    Take 1 capsule (50,000 Units) by mouth once a week    Vitamin D deficiency, Overweight, HTN, goal below 140/90, Blood sugar increased

## 2017-10-19 NOTE — NURSING NOTE
"Chief Complaint   Patient presents with     Abdominal Pain       Initial /78 (BP Location: Right arm, Patient Position: Chair, Cuff Size: Adult Large)  Pulse 94  Temp 98.2  F (36.8  C) (Oral)  Ht 5' 7\" (1.702 m)  Wt 187 lb 12.8 oz (85.2 kg)  SpO2 98%  Breastfeeding? No  BMI 29.41 kg/m2 Estimated body mass index is 29.41 kg/(m^2) as calculated from the following:    Height as of this encounter: 5' 7\" (1.702 m).    Weight as of this encounter: 187 lb 12.8 oz (85.2 kg).  Medication Reconciliation: complete   Cinthia Quintana CMA      "

## 2017-10-19 NOTE — PROGRESS NOTES
"      ASSESSMENT & PLAN:                                                      (R10.32) Abdominal pain, left lower quadrant  (primary encounter diagnosis)  Comment: possible hernia   Plan: CT Abdomen Pelvis w Contrast            (F41.1) Generalized anxiety disorder  Comment: Controlled    Plan: sertraline (ZOLOFT) 100 MG tablet            (I10) HTN, goal below 140/90  Comment: Controlled    Plan: Continue same meds, same doses for now     (G35) Multiple sclerosis (H)  Comment: stable through he years  Plan:        Chief Complaint:                                                      abd buldge       SUBJECTIVE:                                                    History of present illness      1 week of abd pain and bulging on the L side of the umbilicus area     ROS:                                                      ROS: negative for fever, chills, cough, wheezes, chest pain, shortness of breath, vomiting, abdominal pain, leg swelling     A 10-point review of systems was obtained.  Those pertinent are above and in the in the Subjective section.  The rest of the systems are negative.        OBJECTIVE:                                                    Physical Exam :    Blood pressure 118/78, pulse 94, temperature 98.2  F (36.8  C), temperature source Oral, height 5' 7\" (1.702 m), weight 187 lb 12.8 oz (85.2 kg), SpO2 98 %, not currently breastfeeding.   NAD, appears comfortable  Skin: no rashes   Neck: supple, no JVD, No thyroidmegaly. Lymph nodes nonpalpable cervical and supraclavicular.  Chest: clear to auscultation bilaterally, good respiratory effort  Heart: S1 S2, RRR, no mgr appreciated  Abdomen: soft, mild tender L side, no hepatosplenomegaly or masses appreciated, no abdominal bruit, present bowel sounds  Extremities: no edema,   Neurologic: A, Ox3, no focal signs appreciated    PMHx: reviewed  Past Medical History:   Diagnosis Date     Calculus of kidney      Depressive disorder, not elsewhere classified  "     ESTEPHANIA (generalised anxiety disorder)      High risk HPV infection 3/20/15,10/17/16    Not HPV 16 or 18, HR, Not 16 or 18     HTN, goal below 140/90      Lung nodules May 2010    Needs f/u CT Nov 2010     Multiple sclerosis (H) Dx in 2001     PONV (postoperative nausea and vomiting)      Vitamin D deficiencies       PSHx: reviewed  Past Surgical History:   Procedure Laterality Date     C NONSPECIFIC PROCEDURE      2 procedures for kidney stones     HC COLP CERVIX/UPPER VAGINA W LOOP ELEC BX CERVIX       HC REMOVAL OF TONSILS,<13 Y/O      Tonsils <12y.o.     LAPAROSCOPIC CHOLECYSTECTOMY  5/8/2014    Procedure: LAPAROSCOPIC CHOLECYSTECTOMY;  Surgeon: Sona Giraldo MD;  Location:  OR        Meds: reviewed  Current Outpatient Prescriptions   Medication Sig Dispense Refill     sertraline (ZOLOFT) 100 MG tablet Take 1 tablet (100 mg) by mouth daily 90 tablet 1     LORazepam (ATIVAN) 0.5 MG tablet 1 po  qd prn anxiety 30 tablet 0     vitamin D (ERGOCALCIFEROL) 46882 UNIT capsule Take 1 capsule (50,000 Units) by mouth once a week 12 capsule 1     topiramate (TOPAMAX) 25 MG tablet Take 2 tablets (50 mg) by mouth 2 times daily 360 tablet 1     lisinopril (PRINIVIL/ZESTRIL) 20 MG tablet Take 1 tablet (20 mg) by mouth daily 90 tablet 3     atenolol (TENORMIN) 25 MG tablet Take 1 tablet (25 mg) by mouth daily 90 tablet 1     omeprazole (PRILOSEC) 40 MG capsule Take 1 capsule (40 mg) by mouth daily Take 30-60 minutes before a meal. 90 capsule 1     order for DME Equipment being ordered: [employer] DREAM STATION WITH HH AND MIRAGE FX FOR HER NASAL MASK WITH CHINSTRAP.  PRESSURE 7-15 CM.       [DISCONTINUED] sertraline (ZOLOFT) 100 MG tablet Take 1 tablet (100 mg) by mouth daily 90 tablet 0       Soc Hx: reviewed  Fam Hx: reviewed          Gely Garcia MD  Internal Medicine

## 2017-10-23 ENCOUNTER — HOSPITAL ENCOUNTER (OUTPATIENT)
Dept: CT IMAGING | Facility: CLINIC | Age: 37
Discharge: HOME | End: 2017-10-23
Attending: INTERNAL MEDICINE | Admitting: INTERNAL MEDICINE
Payer: COMMERCIAL

## 2017-10-23 DIAGNOSIS — R10.32 ABDOMINAL PAIN, LEFT LOWER QUADRANT: ICD-10-CM

## 2017-10-23 DIAGNOSIS — E27.9 ADRENAL NODULE (H): Primary | ICD-10-CM

## 2017-10-23 PROCEDURE — 74177 CT ABD & PELVIS W/CONTRAST: CPT

## 2017-10-23 PROCEDURE — 25000128 H RX IP 250 OP 636: Performed by: RADIOLOGY

## 2017-10-23 RX ORDER — IOPAMIDOL 755 MG/ML
500 INJECTION, SOLUTION INTRAVASCULAR ONCE
Status: COMPLETED | OUTPATIENT
Start: 2017-10-23 | End: 2017-10-23

## 2017-10-23 RX ADMIN — IOPAMIDOL 94 ML: 755 INJECTION, SOLUTION INTRAVENOUS at 11:03

## 2017-10-23 RX ADMIN — SODIUM CHLORIDE 54 ML: 9 INJECTION, SOLUTION INTRAVENOUS at 11:03

## 2017-10-27 ENCOUNTER — TELEPHONE (OUTPATIENT)
Dept: INTERNAL MEDICINE | Facility: CLINIC | Age: 37
End: 2017-10-27

## 2017-10-27 NOTE — TELEPHONE ENCOUNTER
If they are occurring frequently, the ER would be fastest way to do evaluation and might provide some IV fluid to flush her system   No increase caffeine?

## 2017-10-27 NOTE — TELEPHONE ENCOUNTER
Pt called. Stated that she has been having heart palpitations since Monday, and they are occurring very frequently. Pt stated she had them before and [facility] told her they were not dangerous. Pt had a CT w/contract on Monday, and on the paperwork they give you after it says if you have heart palpitations contact your doctor. Pt stated she thinks she is ok, but wants to know what a provider thinks? Is there anything she can take or do? Pt does not want to go to ER, but will if provider thinks she should.

## 2017-10-28 ENCOUNTER — HOSPITAL ENCOUNTER (EMERGENCY)
Facility: CLINIC | Age: 37
Discharge: HOME | End: 2017-10-28
Attending: EMERGENCY MEDICINE | Admitting: EMERGENCY MEDICINE
Payer: COMMERCIAL

## 2017-10-28 VITALS
SYSTOLIC BLOOD PRESSURE: 131 MMHG | HEART RATE: 78 BPM | DIASTOLIC BLOOD PRESSURE: 92 MMHG | WEIGHT: 185 LBS | OXYGEN SATURATION: 95 % | HEIGHT: 67 IN | RESPIRATION RATE: 16 BRPM | BODY MASS INDEX: 29.03 KG/M2 | TEMPERATURE: 98.3 F

## 2017-10-28 DIAGNOSIS — I49.8 VENTRICULAR BIGEMINY: ICD-10-CM

## 2017-10-28 DIAGNOSIS — E83.42 HYPOMAGNESEMIA: ICD-10-CM

## 2017-10-28 LAB
ANION GAP SERPL CALCULATED.3IONS-SCNC: 9 MMOL/L (ref 3–14)
BASOPHILS # BLD AUTO: 0 10E9/L (ref 0–0.2)
BASOPHILS NFR BLD AUTO: 0.5 %
BUN SERPL-MCNC: 11 MG/DL (ref 7–30)
CALCIUM SERPL-MCNC: 7.8 MG/DL (ref 8.5–10.1)
CHLORIDE SERPL-SCNC: 100 MMOL/L (ref 94–109)
CO2 SERPL-SCNC: 25 MMOL/L (ref 20–32)
CREAT SERPL-MCNC: 0.78 MG/DL (ref 0.52–1.04)
DIFFERENTIAL METHOD BLD: ABNORMAL
EOSINOPHIL # BLD AUTO: 0.1 10E9/L (ref 0–0.7)
EOSINOPHIL NFR BLD AUTO: 2.1 %
ERYTHROCYTE [DISTWIDTH] IN BLOOD BY AUTOMATED COUNT: 15.6 % (ref 10–15)
GFR SERPL CREATININE-BSD FRML MDRD: 83 ML/MIN/1.7M2
GLUCOSE SERPL-MCNC: 84 MG/DL (ref 70–99)
HCG SERPL QL: NEGATIVE
HCT VFR BLD AUTO: 32.2 % (ref 35–47)
HGB BLD-MCNC: 10.4 G/DL (ref 11.7–15.7)
IMM GRANULOCYTES # BLD: 0 10E9/L (ref 0–0.4)
IMM GRANULOCYTES NFR BLD: 0.3 %
INTERPRETATION ECG - MUSE: NORMAL
LYMPHOCYTES # BLD AUTO: 3 10E9/L (ref 0.8–5.3)
LYMPHOCYTES NFR BLD AUTO: 45.2 %
MAGNESIUM SERPL-MCNC: 1.4 MG/DL (ref 1.6–2.3)
MCH RBC QN AUTO: 29.2 PG (ref 26.5–33)
MCHC RBC AUTO-ENTMCNC: 32.3 G/DL (ref 31.5–36.5)
MCV RBC AUTO: 90 FL (ref 78–100)
MONOCYTES # BLD AUTO: 0.5 10E9/L (ref 0–1.3)
MONOCYTES NFR BLD AUTO: 7.9 %
NEUTROPHILS # BLD AUTO: 2.9 10E9/L (ref 1.6–8.3)
NEUTROPHILS NFR BLD AUTO: 44 %
NRBC # BLD AUTO: 0 10*3/UL
NRBC BLD AUTO-RTO: 0 /100
PLATELET # BLD AUTO: 273 10E9/L (ref 150–450)
POTASSIUM SERPL-SCNC: 3.5 MMOL/L (ref 3.4–5.3)
RBC # BLD AUTO: 3.56 10E12/L (ref 3.8–5.2)
SODIUM SERPL-SCNC: 134 MMOL/L (ref 133–144)
WBC # BLD AUTO: 6.6 10E9/L (ref 4–11)

## 2017-10-28 PROCEDURE — 84703 CHORIONIC GONADOTROPIN ASSAY: CPT | Performed by: EMERGENCY MEDICINE

## 2017-10-28 PROCEDURE — 99284 EMERGENCY DEPT VISIT MOD MDM: CPT | Mod: 25

## 2017-10-28 PROCEDURE — 93005 ELECTROCARDIOGRAM TRACING: CPT

## 2017-10-28 PROCEDURE — 96365 THER/PROPH/DIAG IV INF INIT: CPT

## 2017-10-28 PROCEDURE — 25000132 ZZH RX MED GY IP 250 OP 250 PS 637: Performed by: EMERGENCY MEDICINE

## 2017-10-28 PROCEDURE — 80048 BASIC METABOLIC PNL TOTAL CA: CPT | Performed by: EMERGENCY MEDICINE

## 2017-10-28 PROCEDURE — 83735 ASSAY OF MAGNESIUM: CPT | Performed by: EMERGENCY MEDICINE

## 2017-10-28 PROCEDURE — 25000125 ZZHC RX 250: Performed by: EMERGENCY MEDICINE

## 2017-10-28 PROCEDURE — 85025 COMPLETE CBC W/AUTO DIFF WBC: CPT | Performed by: EMERGENCY MEDICINE

## 2017-10-28 RX ORDER — POTASSIUM CHLORIDE 1.5 G/1.58G
40 POWDER, FOR SOLUTION ORAL ONCE
Status: COMPLETED | OUTPATIENT
Start: 2017-10-28 | End: 2017-10-28

## 2017-10-28 RX ORDER — AZITHROMYCIN 250 MG/1
TABLET, FILM COATED ORAL
COMMUNITY
Start: 2017-10-16 | End: 2018-03-28

## 2017-10-28 RX ADMIN — POTASSIUM CHLORIDE 40 MEQ: 1.5 POWDER, FOR SOLUTION ORAL at 18:06

## 2017-10-28 RX ADMIN — Medication 2 G: at 17:47

## 2017-10-28 ASSESSMENT — ENCOUNTER SYMPTOMS
LIGHT-HEADEDNESS: 1
FEVER: 0
NAUSEA: 0
CHILLS: 0
CHEST TIGHTNESS: 0
PALPITATIONS: 1
DIZZINESS: 0
VOMITING: 0
SPEECH DIFFICULTY: 0
COUGH: 1

## 2017-10-28 NOTE — ED AVS SNAPSHOT
[facility] Emergency Department    [address]    [facility] [address]    Phone:  [phone]    Fax:  [phone]                                       Tati Hamilton   MRN: [id number]    Department:  [facility] Emergency Department   Date of Visit:  10/28/2017           Patient Information     Date Of Birth          1980        Your diagnoses for this visit were:     Ventricular bigeminy     Hypomagnesemia        You were seen by Roxanne Chou MD.      Follow-up Information     Follow up with [facility] Emergency Department.    Specialty:  EMERGENCY MEDICINE    Why:  immediately , If symptoms worsen    Contact information:    [address]  [facility] [address] [phone]        Follow up with Gely Le MD In 2 days.    Specialty:  Internal Medicine    Why:  for recheck of your palpitations    Contact information:    [address]  [facility] [address]  [phone]        Discharge References/Attachments     ABOUT ARRHYTHMIAS (ENGLISH)      Future Appointments        Provider Department Dept Phone Center    10/31/2017 10:15 AM [facility] CT ROOM 1 [facility] [phone] [facility]      24 Hour Appointment Hotline       To make an appointment at any [facility], call [phone] ([phone]). If you don't have a family doctor or clinic, we will help you find one. [facility] clinics are conveniently located to serve the needs of you and your family.             Review of your medicines      Our records show that you are taking the medicines listed below. If these are incorrect, please call your family doctor or clinic.        Dose / Directions Last dose taken    atenolol 25 MG tablet   Commonly known as:  TENORMIN   Dose:  25 mg   Quantity:  90 tablet        Take 1 tablet (25 mg) by mouth daily   Refills:  1        azithromycin 250 MG tablet   Commonly known  as:  ZITHROMAX        Refills:  0        lisinopril 20 MG tablet   Commonly known as:  PRINIVIL/ZESTRIL   Dose:  20 mg   Quantity:  90 tablet        Take 1 tablet (20 mg) by mouth daily   Refills:  3        LORazepam 0.5 MG tablet   Commonly known as:  ATIVAN   Quantity:  30 tablet        1 po  qd prn anxiety   Refills:  0        omeprazole 40 MG capsule   Commonly known as:  priLOSEC   Dose:  40 mg   Quantity:  90 capsule        Take 1 capsule (40 mg) by mouth daily Take 30-60 minutes before a meal.   Refills:  1        order for DME        Equipment being ordered: [employer] DREAM STATION WITH HH AND MIRAGE FX FOR HER NASAL MASK WITH CHINSTRAP.  PRESSURE 7-15 CM.   Refills:  0        sertraline 100 MG tablet   Commonly known as:  ZOLOFT   Dose:  100 mg   Quantity:  90 tablet        Take 1 tablet (100 mg) by mouth daily   Refills:  1        topiramate 25 MG tablet   Commonly known as:  TOPAMAX   Dose:  50 mg   Quantity:  360 tablet        Take 2 tablets (50 mg) by mouth 2 times daily   Refills:  1        vitamin D 03878 UNIT capsule   Commonly known as:  ERGOCALCIFEROL   Dose:  05810 Units   Quantity:  12 capsule        Take 1 capsule (50,000 Units) by mouth once a week   Refills:  1                Procedures and tests performed during your visit     Basic metabolic panel    CBC with platelets differential    Cardiac Continuous Monitoring    EKG 12 lead    HCG qualitative    Magnesium    Peripheral IV: Standard      Orders Needing Specimen Collection     None      Pending Results     Date and Time Order Name Status Description    10/28/2017 1456 EKG 12 lead Preliminary             Pending Culture Results     No orders found from 10/26/2017 to 10/29/2017.            Pending Results Instructions     If you had any lab results that were not finalized at the time of your Discharge, you can call the ED Lab Result RN at [phone]. You will be contacted by this team for any positive Lab results or changes in  treatment. The nurses are available 7 days a week from 10A to 6:30P.  You can leave a message 24 hours per day and they will return your call.        Test Results From Your Hospital Stay        10/28/2017  4:21 PM      Component Results     Component Value Ref Range & Units Status    WBC 6.6 4.0 - 11.0 10e9/L Final    RBC Count 3.56 (L) 3.8 - 5.2 10e12/L Final    Hemoglobin 10.4 (L) 11.7 - 15.7 g/dL Final    Hematocrit 32.2 (L) 35.0 - 47.0 % Final    MCV 90 78 - 100 fl Final    MCH 29.2 26.5 - 33.0 pg Final    MCHC 32.3 31.5 - 36.5 g/dL Final    RDW 15.6 (H) 10.0 - 15.0 % Final    Platelet Count 273 150 - 450 10e9/L Final    Diff Method Automated Method  Final    % Neutrophils 44.0 % Final    % Lymphocytes 45.2 % Final    % Monocytes 7.9 % Final    % Eosinophils 2.1 % Final    % Basophils 0.5 % Final    % Immature Granulocytes 0.3 % Final    Nucleated RBCs 0 0 /100 Final    Absolute Neutrophil 2.9 1.6 - 8.3 10e9/L Final    Absolute Lymphocytes 3.0 0.8 - 5.3 10e9/L Final    Absolute Monocytes 0.5 0.0 - 1.3 10e9/L Final    Absolute Eosinophils 0.1 0.0 - 0.7 10e9/L Final    Absolute Basophils 0.0 0.0 - 0.2 10e9/L Final    Abs Immature Granulocytes 0.0 0 - 0.4 10e9/L Final    Absolute Nucleated RBC 0.0  Final         10/28/2017  4:35 PM      Component Results     Component Value Ref Range & Units Status    Sodium 134 133 - 144 mmol/L Final    Potassium 3.5 3.4 - 5.3 mmol/L Final    Chloride 100 94 - 109 mmol/L Final    Carbon Dioxide 25 20 - 32 mmol/L Final    Anion Gap 9 3 - 14 mmol/L Final    Glucose 84 70 - 99 mg/dL Final    Urea Nitrogen 11 7 - 30 mg/dL Final    Creatinine 0.78 0.52 - 1.04 mg/dL Final    GFR Estimate 83 >60 mL/min/1.7m2 Final    Non  GFR Calc    GFR Estimate If Black >90 >60 mL/min/1.7m2 Final    African American GFR Calc    Calcium 7.8 (L) 8.5 - 10.1 mg/dL Final         10/28/2017  4:49 PM      Component Results     Component Value Ref Range & Units Status    HCG Qualitative Serum  Negative NEG^Negative Final    This test is for screening purposes.  Results should be interpreted along with   the clinical picture.  Confirmation testing is available if warranted by   ordering [id number], HCG Quantitative Pregnancy.           10/28/2017  4:35 PM      Component Results     Component Value Ref Range & Units Status    Magnesium 1.4 (L) 1.6 - 2.3 mg/dL Final                Clinical Quality Measure: Blood Pressure Screening     Your blood pressure was checked while you were in the emergency department today. The last reading we obtained was  BP: 121/79 . Please read the guidelines below about what these numbers mean and what you should do about them.  If your systolic blood pressure (the top number) is less than 120 and your diastolic blood pressure (the bottom number) is less than 80, then your blood pressure is normal. There is nothing more that you need to do about it.  If your systolic blood pressure (the top number) is 120-139 or your diastolic blood pressure (the bottom number) is 80-89, your blood pressure may be higher than it should be. You should have your blood pressure rechecked within a year by a primary care provider.  If your systolic blood pressure (the top number) is 140 or greater or your diastolic blood pressure (the bottom number) is 90 or greater, you may have high blood pressure. High blood pressure is treatable, but if left untreated over time it can put you at risk for heart attack, stroke, or kidney failure. You should have your blood pressure rechecked by a primary care provider within the next 4 weeks.  If your provider in the emergency department today gave you specific instructions to follow-up with your doctor or provider even sooner than that, you should follow that instruction and not wait for up to 4 weeks for your follow-up visit.        Thank you for choosing [facility]       Thank you for choosing [facility] for your care. Our goal is always to provide you with excellent  care. Hearing back from our patients is one way we can continue to improve our services. Please take a few minutes to complete the written survey that you may receive in the mail after you visit with us. Thank you!        [email or web address] Information     [employer] gives you secure access to your electronic health record. If you see a primary care provider, you can also send messages to your care team and make appointments. If you have questions, please call your primary care clinic.  If you do not have a primary care provider, please call [phone] and they will assist you.        Care EveryWhere ID     This is your Care EveryWhere ID. This could be used by other organizations to access your [facility] medical records  [id number]        Equal Access to Services     GILBERTO HARRINGTON : Tod Crespo, luis lopez, farrukh severino, laura rae. So [facility] [phone].    ATENCIÓN: Si habla español, tiene a acosta disposición servicios gratuitos de asistencia lingüística. Llame al [phone].    We comply with applicable federal civil rights laws and [place] laws. We do not discriminate on the basis of race, color, national origin, age, disability, sex, sexual orientation, or gender identity.            After Visit Summary       This is your record. Keep this with you and show to your community pharmacist(s) and doctor(s) at your next visit.

## 2017-10-28 NOTE — ED NOTES
Started to have palpitations on and off since Tuesday. Today palpitations don't seem to be going away. Denies chest pain SOB but states feels light headed

## 2017-10-28 NOTE — ED NOTES
[facility]  ED Nurse Handoff Report    Tati Hamilton is a 36 year old female   ED Chief complaint: Palpitations  . ED Diagnosis:   Final diagnoses:   Ventricular bigeminy   Hypomagnesemia     Allergies:   Allergies   Allergen Reactions     No Known Drug Allergies        Code Status: Full Code  Activity level - Baseline/Home:  Independent. Activity Level - Current:   Independent. Lift room needed: No. Bariatric: No   Needed: No   Isolation: No. Infection: Not Applicable.     Vital Signs:   Vitals:    10/28/17 1615 10/28/17 1630 10/28/17 1645 10/28/17 1700   BP: 121/86 126/75 129/74 119/88   Pulse:       Resp:       Temp:       TempSrc:       SpO2: 98% 97% 97% 98%   Weight:       Height:           Cardiac Rhythm:  ,   Cardiac  Cardiac Rhythm: Normal sinus rhythm  Pain level:    Patient confused: No. Patient Falls Risk: Yes.   Elimination Status: Has voided   Patient Report - Initial Complaint: palpitations. Focused Assessment:   Started to have palpitations on and off since Tuesday. Today palpitations don't seem to be going away. Denies chest pain SOB but states feels light headed   Cardiac - Cardiac WDL:  WDL except Capillary Refill, General: less than/equal to 3 secs Cardiac Comment: no chest pain  Review of Systems (Cardiac) - Cardiac Signs/Symptoms: dizziness; palpitations Cardiac Conditions: hypertension  Chest Pain Assessment - Rating (0-10): 0 Precipitating Factors: nothing Associated Signs/Symptoms: dizziness Chest Pain Reproducible?: No  Cardiac Monitoring - EKG Monitoring: Yes Cardiac Regularity: Irregular Cardiac Rhythm: NSR w/ bigeminal PVCs    Respiratory - Respiratory WDL: WDL Lung Fields: All Fields Throughout All Lung Fields: Anterior:; Posterior:; clear  Tests Performed:  Labs, EKG  Labs Ordered and Resulted from Time of ED Arrival Up to the Time of Departure from the ED   CBC WITH PLATELETS DIFFERENTIAL - Abnormal; Notable for the following:        Result Value    RBC  Count 3.56 (*)     Hemoglobin 10.4 (*)     Hematocrit 32.2 (*)     RDW 15.6 (*)     All other components within normal limits   BASIC METABOLIC PANEL - Abnormal; Notable for the following:     Calcium 7.8 (*)     All other components within normal limits   MAGNESIUM - Abnormal; Notable for the following:     Magnesium 1.4 (*)     All other components within normal limits   HCG QUALITATIVE   PERIPHERAL IV CATHETER   CARDIAC CONTINUOUS MONITORING   Treatments provided: montitor, mag, see mar  Family Comments: no family present  OBS brochure/video discussed/provided to patient:  N/A  ED Medications:   Medications   magnesium sulfate 2 g in NS intermittent infusion ([facility] or [facility]) (not administered)     Drips infusing:  Yes  For the majority of the shift, the patient's behavior Green. Interventions performed were frequent rounding.     Severe Sepsis OR Septic Shock Diagnosis Present: No      ED Nurse Name/Phone Number: Ana Hoffman,   5:19 PM

## 2017-10-28 NOTE — ED PROVIDER NOTES
History     Chief Complaint:  Heart Palpitations    HPI   Tati Hamilton is a 36 year old female with a history of palpitations, hypertension, lung nodules, multiple sclerosis, among others, who presents for evaluation of intermittent heart palpitations over the past week, each episode lasting around two hours. Today the palpitations worsened so she decided to present to the emergency department. The patient cannot attribute anything that may have set these palpitations off and denies any dizziness or chest discomfort with them. However she does note that she feels the need to cough when they happen, and today she has been feeling lightheaded regardless of whether she is having palpitations or not. Her lightheadedness does not change with standing or sitting. She has not had any loss of consciousness since she first noticed having these palpitations around a week ago. Of note, the patient had similar episodes of palpitations around 2 years ago. Her primary care doctor did not believe they were concerning at that time. Additionally, the patient had a CT done last week due to left abdominal pain and she states that they found a kidney nodule. Her last normal period was about a week ago. At a [employer] party last night, she drank around 5 drinks. She admits that she infrequently uses CPAP for diagnosed CHARLES.The patient denies any recent fevers, nausea, vomiting or chills.    Cardiac Risk Factors   Sex: Female   Tobacco: Positive  Hypertension: Positive  Diabetes: Negative  Hyperlipidemia: Negative  Family History: Negative    Allergies:  NKDA    Medications:    Zoloft  Ativan  Ergocalciferol  Topamax  Prinivil  Tenormin  Prilosec    Past Medical History:    Calculus of Kidney  Palpitations  Depressive Disorder  ESTEPHANIA  HTN  Lung Nodules  Multiple Sclerosis  Vitamin D Deficiencies  CHARLES    Past Surgical History:    2 Procedures for Kidney Stones  Hugo Cervix / Upper Vagina with Loop Elec Bx  "Cervix  Tonsillectomy  Laparoscopic Cholecystectomy    Family History:    Mother: Gallbladder Disease, Varicose Veins  Father: HTN    Social History:  Current Every Day Smoker  Occasional Alcohol Use: Around 5 drinks 3 times per week  Marital Status:       Review of Systems   Constitutional: Negative for chills and fever.   Respiratory: Positive for cough. Negative for chest tightness.    Cardiovascular: Positive for palpitations. Negative for chest pain.   Gastrointestinal: Negative for nausea and vomiting.   Neurological: Positive for light-headedness. Negative for dizziness, syncope and speech difficulty.   All other systems reviewed and are negative.      Physical Exam     Patient Vitals for the past 24 hrs:   BP Temp Temp src Pulse Heart Rate Resp SpO2 Height Weight   10/28/17 1830 121/79 - - - - - - - -   10/28/17 1815 (!) 132/91 - - - 89 - - - -   10/28/17 1800 127/79 - - - 80 - - - -   10/28/17 1745 (!) 134/112 - - - 87 - - - -   10/28/17 1730 (!) 129/93 - - - 85 - 96 % - -   10/28/17 1715 128/84 - - - 85 - 97 % - -   10/28/17 1700 119/88 - - - 81 - 98 % - -   10/28/17 1645 129/74 - - - 81 - 97 % - -   10/28/17 1630 126/75 - - - 90 - 97 % - -   10/28/17 1615 121/86 - - - 89 - 98 % - -   10/28/17 1600 (!) 131/94 - - - 95 - 99 % - -   10/28/17 1545 (!) 137/95 - - - 91 - 99 % - -   10/28/17 1530 119/83 - - - 84 - 98 % - -   10/28/17 1515 120/84 - - - 84 - 99 % - -   10/28/17 1500 (!) 139/99 - - - 77 - 99 % - -   10/28/17 1445 (!) 146/97 - - - 77 - 98 % - -   10/28/17 1426 (!) 154/107 98.3  F (36.8  C) Oral 78 - 12 98 % 1.702 m (5' 7\") 83.9 kg (185 lb)       Physical Exam  Gen: Pleasant, appears stated age.    Eye:   Pupils are equal, round, and reactive.     Sclera non-injected.    ENT:   Moist mucus membranes.     Normal tongue.    Oropharynx without lesions.   No thyromegaly.    Cardiac:     2+ radial pulses.   Normal rate and regular rhythm.    No murmurs, gallops, or rubs.    Pulmonary:     Clear to " auscultation bilaterally.    No wheezes, rales, or rhonchi.    Abdomen:     Normal active bowel sounds.     Abdomen is soft and non-distended, without focal tenderness.    Musculoskeletal:     Normal movement of all extremities without evidence for deficit.    Extremities:    No edema.    Skin:   Warm and dry.    Neurologic:    Non-focal exam without asymmetric weakness or numbness.    Normal tone    Psychiatric:     Normal affect with appropriate interaction with examiner.      Emergency Department Course   ECG:  Indication: Heart Palpitations  Time: 1421  Vent. Rate 75 bpm. DE interval 136. QRS duration 86. QT/QTc 398/444. P-R-T axis 53 40 34. Sinus rhythm with frequent premature ventricular complexes in a pattern of bigeminy. Otherwise normal ECG. Read time: 1445    Laboratory:  CBC: WBC: 6.6, HGB: 10.4 (H), PLT: 273  BMP: Calcium 7.8 (L), o/w WNL (Creatinine: 0.78)  HCG Qualitative: Negative   Magnesium: 1.4 (L)    Interventions:  17:47 Magnesium Sulfate 2 g in NS Intermittent Infusion IV  18:06 Potassium Chloride 40 mEq PO    Emergency Department Course:  Nursing notes and vitals reviewed.  I performed an exam of the patient as documented above.   EKG obtained in the ED, see results above.   Blood drawn. This was sent to the lab for further testing, results above.  IV inserted. Medicine administered as documented above.     17:58 I consulted with Dr. Roa about the patient's symptoms, history, workup and plan. Dr. Roa states that the patient may not need to be admitted due to her ventricular bigeminy.  18:07 I rechecked the patient, who states that she would rather not be admitted if no need.  Ben[place] of PVC's has decreased.    I personally reviewed the laboratory results with the Patient and answered all related questions prior to discharge.   Findings and plan explained to the Patient. Patient discharged home with instructions regarding supportive care, medications, and reasons to return. The  importance of close follow-up was reviewed.       Impression & Plan      Medical Decision Making:  Tati Hamilton is a 36 year old female with history of obstructive sleep apnea, palpitations and hypertension who presents today with palpitations. On exam, the patient is well appearing. EKG demonstrates ventricular bigeminy. Labs were notable for low magnesium levels at 1.5, borderline low potassium level at 3.5. This is likely related to recent heavy alcohol use as recent as last night. At this point there is no evidence for ischemia, cardiogenic shock, syncopy, or other dangerous conditions. Her electrolytes were depleted. We discussed that she should start wearing her CPAP machine more regularly as this will likely decrease the prevalence of palpitations. She is open to this. We also discussed that she should decrease alcohol intake. She will follow up with PCP in the next 2-3 days for recheck of EKG and otherwise will return sooner for syncopy, chest pain, worsening palpitations or for other concerns.    Diagnosis:    ICD-10-CM    1. Ventricular bigeminy I49.9    2. Hypomagnesemia E83.42        Disposition:  Discharged to home.    Discharge Medications:  New Prescriptions    No medications on file       IHuseyin, am serving as a scribe on 10/28/2017 at 2:17 PM to personally document services performed by Roxanne Chou MD based on my observations and the provider's statements to me.     10/28/2017   [facility] EMERGENCY DEPARTMENT       Roxanne Chou MD  10/29/17 0038

## 2017-10-28 NOTE — ED AVS SNAPSHOT
[facility] Emergency Department    [address]    [facility] [address]    Phone:  [phone]    Fax:  [phone]                                       Tati Hamilton   MRN: [id number]    Department:  [facility] Emergency Department   Date of Visit:  10/28/2017           After Visit Summary Signature Page     I have received my discharge instructions, and my questions have been answered. I have discussed any challenges I see with this plan with the nurse or doctor.    ..........................................................................................................................................  Patient/Patient Representative Signature      ..........................................................................................................................................  Patient Representative Print Name and Relationship to Patient    ..................................................               ................................................  Date                                            Time    ..........................................................................................................................................  Reviewed by Signature/Title    ...................................................              ..............................................  Date                                                            Time

## 2017-11-01 ENCOUNTER — HOSPITAL ENCOUNTER (OUTPATIENT)
Dept: CT IMAGING | Facility: CLINIC | Age: 37
Discharge: HOME | End: 2017-11-01
Attending: INTERNAL MEDICINE | Admitting: INTERNAL MEDICINE
Payer: COMMERCIAL

## 2017-11-01 DIAGNOSIS — E27.9 ADRENAL NODULE (H): ICD-10-CM

## 2017-11-01 PROCEDURE — 74150 CT ABDOMEN W/O CONTRAST: CPT

## 2017-11-07 DIAGNOSIS — K21.9 ESOPHAGEAL REFLUX: ICD-10-CM

## 2017-11-13 DIAGNOSIS — R07.0 THROAT PAIN: ICD-10-CM

## 2017-11-13 DIAGNOSIS — R07.0 THROAT PAIN: Primary | ICD-10-CM

## 2017-11-13 LAB
BACTERIA SPEC CULT: NORMAL
DEPRECATED S PYO AG THROAT QL EIA: NORMAL
SPECIMEN SOURCE: NORMAL
SPECIMEN SOURCE: NORMAL

## 2017-11-13 PROCEDURE — 87086 URINE CULTURE/COLONY COUNT: CPT | Performed by: INTERNAL MEDICINE

## 2017-11-13 PROCEDURE — 87081 CULTURE SCREEN ONLY: CPT | Performed by: INTERNAL MEDICINE

## 2017-11-13 PROCEDURE — 87880 STREP A ASSAY W/OPTIC: CPT | Performed by: INTERNAL MEDICINE

## 2017-11-14 LAB
BACTERIA SPEC CULT: NORMAL
SPECIMEN SOURCE: NORMAL

## 2017-11-30 DIAGNOSIS — G47.33 OSA (OBSTRUCTIVE SLEEP APNEA): Primary | ICD-10-CM

## 2017-12-01 DIAGNOSIS — R59.9 SWOLLEN GLAND: Primary | ICD-10-CM

## 2017-12-01 DIAGNOSIS — R59.9 SWOLLEN GLAND: ICD-10-CM

## 2017-12-01 LAB
BASOPHILS # BLD AUTO: 0 10E9/L (ref 0–0.2)
BASOPHILS NFR BLD AUTO: 0.3 %
CRP SERPL-MCNC: 2.9 MG/L (ref 0–8)
DIFFERENTIAL METHOD BLD: ABNORMAL
EOSINOPHIL # BLD AUTO: 0.2 10E9/L (ref 0–0.7)
EOSINOPHIL NFR BLD AUTO: 2.5 %
ERYTHROCYTE [DISTWIDTH] IN BLOOD BY AUTOMATED COUNT: 14.6 % (ref 10–15)
ERYTHROCYTE [SEDIMENTATION RATE] IN BLOOD BY WESTERGREN METHOD: 18 MM/H (ref 0–20)
HCT VFR BLD AUTO: 33.9 % (ref 35–47)
HETEROPH AB SER QL: NEGATIVE
HGB BLD-MCNC: 10.7 G/DL (ref 11.7–15.7)
LDH SERPL L TO P-CCNC: 135 U/L (ref 81–234)
LYMPHOCYTES # BLD AUTO: 2.4 10E9/L (ref 0.8–5.3)
LYMPHOCYTES NFR BLD AUTO: 34.6 %
MCH RBC QN AUTO: 28.9 PG (ref 26.5–33)
MCHC RBC AUTO-ENTMCNC: 31.6 G/DL (ref 31.5–36.5)
MCV RBC AUTO: 92 FL (ref 78–100)
MONOCYTES # BLD AUTO: 0.7 10E9/L (ref 0–1.3)
MONOCYTES NFR BLD AUTO: 9.5 %
NEUTROPHILS # BLD AUTO: 3.6 10E9/L (ref 1.6–8.3)
NEUTROPHILS NFR BLD AUTO: 53.1 %
PLATELET # BLD AUTO: 283 10E9/L (ref 150–450)
RBC # BLD AUTO: 3.7 10E12/L (ref 3.8–5.2)
WBC # BLD AUTO: 6.8 10E9/L (ref 4–11)

## 2017-12-01 PROCEDURE — 86140 C-REACTIVE PROTEIN: CPT | Performed by: INTERNAL MEDICINE

## 2017-12-01 PROCEDURE — 85025 COMPLETE CBC W/AUTO DIFF WBC: CPT | Performed by: INTERNAL MEDICINE

## 2017-12-01 PROCEDURE — 36415 COLL VENOUS BLD VENIPUNCTURE: CPT | Performed by: INTERNAL MEDICINE

## 2017-12-01 PROCEDURE — 80053 COMPREHEN METABOLIC PANEL: CPT | Performed by: INTERNAL MEDICINE

## 2017-12-01 PROCEDURE — 86308 HETEROPHILE ANTIBODY SCREEN: CPT | Performed by: INTERNAL MEDICINE

## 2017-12-01 PROCEDURE — 85652 RBC SED RATE AUTOMATED: CPT | Performed by: INTERNAL MEDICINE

## 2017-12-01 PROCEDURE — 83615 LACTATE (LD) (LDH) ENZYME: CPT | Performed by: INTERNAL MEDICINE

## 2017-12-01 NOTE — NURSING NOTE
Dr. Garcia requesting labs be drawn for Mono, CMP, CRP, ESR, LDH and CBC with diff due to continued swollen glands. Orders placed for pt.

## 2017-12-02 LAB
ALBUMIN SERPL-MCNC: 3.6 G/DL (ref 3.4–5)
ALP SERPL-CCNC: 101 U/L (ref 40–150)
ALT SERPL W P-5'-P-CCNC: 29 U/L (ref 0–50)
ANION GAP SERPL CALCULATED.3IONS-SCNC: 7 MMOL/L (ref 3–14)
AST SERPL W P-5'-P-CCNC: 22 U/L (ref 0–45)
BILIRUB SERPL-MCNC: 0.6 MG/DL (ref 0.2–1.3)
BUN SERPL-MCNC: 11 MG/DL (ref 7–30)
CALCIUM SERPL-MCNC: 9 MG/DL (ref 8.5–10.1)
CHLORIDE SERPL-SCNC: 105 MMOL/L (ref 94–109)
CO2 SERPL-SCNC: 26 MMOL/L (ref 20–32)
CREAT SERPL-MCNC: 0.82 MG/DL (ref 0.52–1.04)
GFR SERPL CREATININE-BSD FRML MDRD: 78 ML/MIN/1.7M2
GLUCOSE SERPL-MCNC: 115 MG/DL (ref 70–99)
POTASSIUM SERPL-SCNC: 4.2 MMOL/L (ref 3.4–5.3)
PROT SERPL-MCNC: 7.5 G/DL (ref 6.8–8.8)
SODIUM SERPL-SCNC: 138 MMOL/L (ref 133–144)

## 2018-02-10 ENCOUNTER — HEALTH MAINTENANCE LETTER (OUTPATIENT)
Age: 38
End: 2018-02-10

## 2018-02-12 ENCOUNTER — TELEPHONE (OUTPATIENT)
Dept: INTERNAL MEDICINE | Facility: CLINIC | Age: 38
End: 2018-02-12

## 2018-02-12 DIAGNOSIS — R05.9 COUGH: Primary | ICD-10-CM

## 2018-02-12 RX ORDER — CODEINE PHOSPHATE AND GUAIFENESIN 10; 100 MG/5ML; MG/5ML
1 SOLUTION ORAL EVERY 4 HOURS PRN
Qty: 240 ML | Refills: 0 | Status: SHIPPED | OUTPATIENT
Start: 2018-02-12 | End: 2018-03-28

## 2018-02-12 NOTE — TELEPHONE ENCOUNTER
Patient calling to request robitussin with codeine be sent to her [facility] pharmacy in [place] please.

## 2018-02-19 DIAGNOSIS — K21.9 ESOPHAGEAL REFLUX: Primary | ICD-10-CM

## 2018-03-15 ENCOUNTER — TELEPHONE (OUTPATIENT)
Dept: INTERNAL MEDICINE | Facility: CLINIC | Age: 38
End: 2018-03-15

## 2018-03-15 DIAGNOSIS — G47.00 INSOMNIA: Primary | ICD-10-CM

## 2018-03-15 RX ORDER — TRAZODONE HYDROCHLORIDE 100 MG/1
50 TABLET ORAL
Qty: 45 TABLET | Refills: 1 | Status: SHIPPED | OUTPATIENT
Start: 2018-03-15 | End: 2018-03-28

## 2018-03-15 NOTE — TELEPHONE ENCOUNTER
VO per PCP, ok to sent rx for Trazodone 100mg tabs with directions take 1/2 tab QHS.    Rx sent to pharm and pt informed.

## 2018-03-28 ENCOUNTER — OFFICE VISIT (OUTPATIENT)
Dept: FAMILY MEDICINE | Facility: CLINIC | Age: 38
End: 2018-03-28
Payer: COMMERCIAL

## 2018-03-28 VITALS
WEIGHT: 181 LBS | TEMPERATURE: 98.7 F | BODY MASS INDEX: 28.41 KG/M2 | SYSTOLIC BLOOD PRESSURE: 125 MMHG | DIASTOLIC BLOOD PRESSURE: 80 MMHG | OXYGEN SATURATION: 99 % | RESPIRATION RATE: 20 BRPM | HEIGHT: 67 IN | HEART RATE: 96 BPM

## 2018-03-28 DIAGNOSIS — J06.9 VIRAL URI: Primary | ICD-10-CM

## 2018-03-28 DIAGNOSIS — R07.0 THROAT PAIN: ICD-10-CM

## 2018-03-28 DIAGNOSIS — B30.9 VIRAL CONJUNCTIVITIS OF BOTH EYES: ICD-10-CM

## 2018-03-28 LAB
DEPRECATED S PYO AG THROAT QL EIA: NORMAL
SPECIMEN SOURCE: NORMAL

## 2018-03-28 PROCEDURE — 87081 CULTURE SCREEN ONLY: CPT | Performed by: NURSE PRACTITIONER

## 2018-03-28 PROCEDURE — 99213 OFFICE O/P EST LOW 20 MIN: CPT | Performed by: NURSE PRACTITIONER

## 2018-03-28 PROCEDURE — 87880 STREP A ASSAY W/OPTIC: CPT | Performed by: NURSE PRACTITIONER

## 2018-03-28 NOTE — PATIENT INSTRUCTIONS
Conjunctivitis, Viral    Viral conjunctivitis (sometimes called pink eye) is a common infection of the eye. It is very contagious. Touching the infected eye, then touching another person passes this infection. It can also be spread from one eye to the other in this same way. The most common symptoms include redness, discharge from the eye, swollen eyelids, and a gritty or scratchy feeling in the eye.  This condition will take about 7 to 10 days to go away. Artificial tears (available without a prescription) are often recommended to moisten and clean the eyes. Antibiotic eye drops often are not recommended because they will not kill the virus. But sometimes they may be prescribed by eye doctors. This is to prevent a second, bacterial infection.  Home care    Apply a towel soaked in cool water to the affected eye 3 to 4 times a day (just before applying medicine to the eye).    It is common to have mucus drainage during the night, causing the eyelids to become crusted by morning. Use a warm, wet cloth to wipe this away.    Wash any cloths used to clean the eye after one use. Don't reuse them.    If antibiotic medicines are prescribed, take them exactly as directed. Don't stop taking them until you are told to.    You may use acetaminophen or ibuprofen to control pain, unless another medicine was prescribed. (Note: If you have chronic liver or kidney disease, or if you have ever had a stomach ulcer or gastrointestinal bleeding, talk with your healthcare provider before using these medicines.) Aspirin should never be used in anyone under 18 years of age who is ill with a fever. It may cause severe liver damage.    Wash your hands before and after touching the affected eye. This helps to prevent spreading the infection to your other eye and to others.    The infected person should avoid sharing towels, washcloths, and bedding with others. This is to prevent spreading the infection.    This illness is contagious during  the first week. Children with this illness should be kept out of day care and school until the redness clears.  Follow-up care  Follow up with your healthcare provider, or as advised.  When to seek medical advice  Call your healthcare provider right away if any of these occur:    Worsening vision    Increasing pain in the eye    Increasing swelling or redness of the eyelid    Redness spreading to the face around the eye    Large amount of green or yellow drainage from the eye    Severe itching in or around the eye    Fever of 100.4 F (38 C) or higher  Date Last Reviewed: 7/1/2017 2000-2017 [employer] [address] [place], [place] [id number]. All rights reserved. This information is not intended as a substitute for professional medical care. Always follow your healthcare professional's instructions.        Viral Upper Respiratory Illness (Adult)  You have a viral upper respiratory illness (URI), which is another term for the common cold. This illness is contagious during the first few days. It is spread through the air by coughing and sneezing. It may also be spread by direct contact (touching the sick person and then touching your own eyes, nose, or mouth). Frequent handwashing will decrease risk of spread. Most viral illnesses go away within 7 to 10 days with rest and simple home remedies. Sometimes the illness may last for several weeks. Antibiotics will not kill a virus, and they are generally not prescribed for this condition.    Home care    If symptoms are severe, rest at home for the first 2 to 3 days. When you resume activity, don't let yourself get too tired.    Avoid being exposed to cigarette smoke (yours or others ).    You may use acetaminophen or ibuprofen to control pain and fever, unless another medicine was prescribed. (Note: If you have chronic liver or kidney disease, have ever had a stomach ulcer or gastrointestinal bleeding, or are taking blood-thinning medicines, talk with  your healthcare provider before using these medicines.) Aspirin should never be given to anyone under 18 years of age who is ill with a viral infection or fever. It may cause severe liver or brain damage.    Your appetite may be poor, so a light diet is fine. Avoid dehydration by drinking 6 to 8 glasses of fluids per day (water, soft drinks, juices, tea, or soup). Extra fluids will help loosen secretions in the nose and lungs.    Over-the-counter cold medicines will not shorten the length of time you re sick, but they may be helpful for the following symptoms: cough, sore throat, and nasal and sinus congestion. (Note: Do not use decongestants if you have high blood pressure.)  Follow-up care  Follow up with your healthcare provider, or as advised.  When to seek medical advice  Call your healthcare provider right away if any of these occur:    Cough with lots of colored sputum (mucus)    Severe headache; face, neck, or ear pain    Difficulty swallowing due to throat pain    Fever of 100.4 F (38 C)  Call 911, or get immediate medical care  Call emergency services right away if any of these occur:    Chest pain, shortness of breath, wheezing, or difficulty breathing    Coughing up blood    Inability to swallow due to throat pain  Date Last Reviewed: 9/13/2015 2000-2017 [employer] [address] [place] [address], [place] [address]. All rights reserved. This information is not intended as a substitute for professional medical care. Always follow your healthcare professional's instructions.

## 2018-03-28 NOTE — PROGRESS NOTES
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    This morning eyes were crusted closed.  Occassionally wipes some thicker drainage from eyes.         Problem list and histories reviewed & adjusted, as indicated.  Additional history: as documented    Current Outpatient Prescriptions   Medication Sig Dispense Refill     ranitidine (ZANTAC) 150 MG tablet Take 1 tablet (150 mg) by mouth 2 times daily 180 tablet 1     vitamin D (ERGOCALCIFEROL) 09320 UNIT capsule Take 1 capsule (50,000 Units) by mouth once a week 12 capsule 1     lisinopril (PRINIVIL/ZESTRIL) 20 MG tablet Take 1 tablet (20 mg) by mouth daily 90 tablet 3     atenolol (TENORMIN) 25 MG tablet Take 1 tablet (25 mg) by mouth daily 90 tablet 1     order for DME Equipment being ordered: RESPIRONICS DREAM STATION WITH HH AND MIRAGE FX FOR HER NASAL MASK WITH CHINSTRAP.  PRESSURE 7-15 CM.       LORazepam (ATIVAN) 0.5 MG tablet 1 po  qd prn anxiety 30 tablet 0     Allergies   Allergen Reactions     No Known Drug Allergies        Reviewed and updated as needed this visit by clinical staff  Tobacco  Allergies  Meds  Problems  Med Hx  Surg Hx  Fam Hx  Soc Hx        Reviewed and updated as needed this visit by Provider         ROS:  Constitutional, HEENT, cardiovascular, pulmonary, gi and gu systems are  "negative, except as otherwise noted.    OBJECTIVE:     /80 (BP Location: Right arm, Patient Position: Sitting, Cuff Size: Adult Regular)  Pulse 96  Temp 98.7  F (37.1  C) (Oral)  Resp 20  Ht 5' 7\" (1.702 m)  Wt 181 lb (82.1 kg)  SpO2 99%  BMI 28.35 kg/m2  Body mass index is 28.35 kg/(m^2).  GENERAL: well nourished, alert and no distress  EYES: Eyes grossly normal to inspection, PERRL and bialt conjunctiva and sclera injected, no purulent drainage  HENT: ear canals and TM's normal, nose and mouth without ulcers or lesions, MMM, oropharynx erytheamtous  NECK: no adenopathy, no asymmetry, masses, or scars and thyroid normal to palpation  RESP: lungs clear to auscultation - no rales, rhonchi or wheezes  CV: regular rate and rhythm, normal S1 S2, no S3 or S4, no murmur, click or rub  SKIN: no suspicious lesions or rashes    Diagnostic Test Results:  Results for orders placed or performed in visit on 03/28/18   Rapid strep screen   Result Value Ref Range    Specimen Description Throat     Rapid Strep A Screen       NEGATIVE: No Group A streptococcal antigen detected by immunoassay, await culture report.         ASSESSMENT/PLAN:   1. Throat pain  - Rapid strep screen--NEG  - Beta strep group A culture    2. Viral URI  Supportive cares; rest, fluids, OTC decongestants.     3. Viral conjunctivitis of both eyes  Lubricating drops, cool compress.       F/u as needed if no improvement or worsening symptoms     BLANCA Cedeño [facility]    "

## 2018-03-28 NOTE — MR AVS SNAPSHOT
After Visit Summary   3/28/2018    Tati Hamilton    MRN: [id number]           Patient Information     Date Of Birth          1980        Visit Information        Provider Department      3/28/2018 11:40 AM Crystal Salas APRN [facility]        Today's Diagnoses     Viral URI    -  1    Throat pain        Viral conjunctivitis of both eyes          Care Instructions      Conjunctivitis, Viral    Viral conjunctivitis (sometimes called pink eye) is a common infection of the eye. It is very contagious. Touching the infected eye, then touching another person passes this infection. It can also be spread from one eye to the other in this same way. The most common symptoms include redness, discharge from the eye, swollen eyelids, and a gritty or scratchy feeling in the eye.  This condition will take about 7 to 10 days to go away. Artificial tears (available without a prescription) are often recommended to moisten and clean the eyes. Antibiotic eye drops often are not recommended because they will not kill the virus. But sometimes they may be prescribed by eye doctors. This is to prevent a second, bacterial infection.  Home care    Apply a towel soaked in cool water to the affected eye 3 to 4 times a day (just before applying medicine to the eye).    It is common to have mucus drainage during the night, causing the eyelids to become crusted by morning. Use a warm, wet cloth to wipe this away.    Wash any cloths used to clean the eye after one use. Don't reuse them.    If antibiotic medicines are prescribed, take them exactly as directed. Don't stop taking them until you are told to.    You may use acetaminophen or ibuprofen to control pain, unless another medicine was prescribed. (Note: If you have chronic liver or kidney disease, or if you have ever had a stomach ulcer or gastrointestinal bleeding, talk with your healthcare provider before using these medicines.) Aspirin  should never be used in anyone under 18 years of age who is ill with a fever. It may cause severe liver damage.    Wash your hands before and after touching the affected eye. This helps to prevent spreading the infection to your other eye and to others.    The infected person should avoid sharing towels, washcloths, and bedding with others. This is to prevent spreading the infection.    This illness is contagious during the first week. Children with this illness should be kept out of day care and school until the redness clears.  Follow-up care  Follow up with your healthcare provider, or as advised.  When to seek medical advice  Call your healthcare provider right away if any of these occur:    Worsening vision    Increasing pain in the eye    Increasing swelling or redness of the eyelid    Redness spreading to the face around the eye    Large amount of green or yellow drainage from the eye    Severe itching in or around the eye    Fever of 100.4 F (38 C) or higher  Date Last Reviewed: 7/1/2017 2000-2017 [employer]. [address] [place] [address]. All rights reserved. This information is not intended as a substitute for professional medical care. Always follow your healthcare professional's instructions.        Viral Upper Respiratory Illness (Adult)  You have a viral upper respiratory illness (URI), which is another term for the common cold. This illness is contagious during the first few days. It is spread through the air by coughing and sneezing. It may also be spread by direct contact (touching the sick person and then touching your own eyes, nose, or mouth). Frequent handwashing will decrease risk of spread. Most viral illnesses go away within 7 to 10 days with rest and simple home remedies. Sometimes the illness may last for several weeks. Antibiotics will not kill a virus, and they are generally not prescribed for this condition.    Home care    If symptoms are severe, rest at  home for the first 2 to 3 days. When you resume activity, don't let yourself get too tired.    Avoid being exposed to cigarette smoke (yours or others ).    You may use acetaminophen or ibuprofen to control pain and fever, unless another medicine was prescribed. (Note: If you have chronic liver or kidney disease, have ever had a stomach ulcer or gastrointestinal bleeding, or are taking blood-thinning medicines, talk with your healthcare provider before using these medicines.) Aspirin should never be given to anyone under 18 years of age who is ill with a viral infection or fever. It may cause severe liver or brain damage.    Your appetite may be poor, so a light diet is fine. Avoid dehydration by drinking 6 to 8 glasses of fluids per day (water, soft drinks, juices, tea, or soup). Extra fluids will help loosen secretions in the nose and lungs.    Over-the-counter cold medicines will not shorten the length of time you re sick, but they may be helpful for the following symptoms: cough, sore throat, and nasal and sinus congestion. (Note: Do not use decongestants if you have high blood pressure.)  Follow-up care  Follow up with your healthcare provider, or as advised.  When to seek medical advice  Call your healthcare provider right away if any of these occur:    Cough with lots of colored sputum (mucus)    Severe headache; face, neck, or ear pain    Difficulty swallowing due to throat pain    Fever of 100.4 F (38 C)  Call 911, or get immediate medical care  Call emergency services right away if any of these occur:    Chest pain, shortness of breath, wheezing, or difficulty breathing    Coughing up blood    Inability to swallow due to throat pain  Date Last Reviewed: 9/13/2015 2000-2017 [employer]. [address] [place] [address]. All rights reserved. This information is not intended as a substitute for professional medical care. Always follow your healthcare professional's  "instructions.                Follow-ups after your visit        Follow-up notes from your care team     Return if symptoms worsen or fail to improve.      Who to contact     If you have questions or need follow up information about today's clinic visit or your schedule please contact [facility] directly at [phone].  Normal or non-critical lab and imaging results will be communicated to you by MyChart, letter or phone within 4 business days after the clinic has received the results. If you do not hear from us within 7 days, please contact the clinic through Vitals [email or web address])hart or phone. If you have a critical or abnormal lab result, we will notify you by phone as soon as possible.  Submit refill requests through [employer]id or call your pharmacy and they will forward the refill request to us. Please allow 3 business days for your refill to be completed.          Additional Information About Your Visit        MyChart Information     [employer] gives you secure access to your electronic health record. If you see a primary care provider, you can also send messages to your care team and make appointments. If you have questions, please call your primary care clinic.  If you do not have a primary care provider, please call [phone] and they will assist you.        Care EveryWhere ID     This is your Care EveryWhere ID. This could be used by other organizations to access your [place] medical records  [id number]        Your Vitals Were     Pulse Temperature Respirations Height Pulse Oximetry BMI (Body Mass Index)    96 98.7  F (37.1  C) (Oral) 20 5' 7\" (1.702 m) 99% 28.35 kg/m2       Blood Pressure from Last 3 Encounters:   03/28/18 125/80   10/28/17 (!) 131/92   10/19/17 118/78    Weight from Last 3 Encounters:   03/28/18 181 lb (82.1 kg)   10/28/17 185 lb (83.9 kg)   10/19/17 187 lb 12.8 oz (85.2 kg)              We Performed the Following     Beta strep group A culture     Rapid strep screen        Primary Care " Provider Office Phone # Fax #    Gely Le [phone]       [address]  [facility] [address]        Equal Access to Services     SHAWNEECARLO LEN : Hadii aad ku hadmarlenao Soomaali, waaxda luqadaha, qaybta kaalmada adeegyada, laura haleyn orin esquivel laYordankatelyn rae. So [facility] [phone].    ATENCIÓN: Si habla español, tiene a acosta disposición servicios gratuitos de asistencia lingüística. Llame al [phone].    We comply with applicable federal civil rights laws and [place] laws. We do not discriminate on the basis of race, color, national origin, age, disability, sex, sexual orientation, or gender identity.            Thank you!     Thank you for choosing [facility]  for your care. Our goal is always to provide you with excellent care. Hearing back from our patients is one way we can continue to improve our services. Please take a few minutes to complete the written survey that you may receive in the mail after your visit with us. Thank you!             Your Updated Medication List - Protect others around you: Learn how to safely use, store and throw away your medicines at www.disposemymeds.org.          This list is accurate as of 3/28/18 12:40 PM.  Always use your most recent med list.                   Brand Name Dispense Instructions for use Diagnosis    atenolol 25 MG tablet    TENORMIN    90 tablet    Take 1 tablet (25 mg) by mouth daily    HTN, goal below 140/90, Overweight, Vitamin D deficiency, Blood sugar increased       lisinopril 20 MG tablet    PRINIVIL/ZESTRIL    90 tablet    Take 1 tablet (20 mg) by mouth daily    Overweight, Vitamin D deficiency, HTN, goal below 140/90, Blood sugar increased       LORazepam 0.5 MG tablet    ATIVAN    30 tablet    1 po  qd prn anxiety        order for DME      Equipment being ordered: RESPIRONICS DREAM STATION WITH HH AND MIRAGE FX FOR HER NASAL MASK WITH CHINSTRAP.  PRESSURE 7-15 CM.        ranitidine 150  MG tablet    ZANTAC    180 tablet    Take 1 tablet (150 mg) by mouth 2 times daily    Esophageal reflux       vitamin D 50548 UNIT capsule    ERGOCALCIFEROL    12 capsule    Take 1 capsule (50,000 Units) by mouth once a week    Vitamin D deficiency, Overweight, HTN, goal below 140/90, Blood sugar increased

## 2018-03-30 DIAGNOSIS — H57.89 REDNESS OF EYE, RIGHT: Primary | ICD-10-CM

## 2018-03-30 LAB
BACTERIA SPEC CULT: NORMAL
SPECIMEN SOURCE: NORMAL

## 2018-03-30 RX ORDER — OFLOXACIN 3 MG/ML
1 SOLUTION/ DROPS OPHTHALMIC EVERY 4 HOURS
Qty: 1 BOTTLE | Refills: 0 | Status: SHIPPED | OUTPATIENT
Start: 2018-03-30 | End: 2018-05-16

## 2018-04-03 DIAGNOSIS — J20.9 ACUTE BRONCHITIS, UNSPECIFIED ORGANISM: Primary | ICD-10-CM

## 2018-04-03 RX ORDER — AZITHROMYCIN 250 MG/1
TABLET, FILM COATED ORAL
Qty: 6 TABLET | Refills: 0 | Status: SHIPPED | OUTPATIENT
Start: 2018-04-03 | End: 2018-05-16

## 2018-05-15 DIAGNOSIS — E66.3 OVERWEIGHT: ICD-10-CM

## 2018-05-15 DIAGNOSIS — E55.9 VITAMIN D DEFICIENCY: ICD-10-CM

## 2018-05-15 DIAGNOSIS — I10 HTN, GOAL BELOW 140/90: ICD-10-CM

## 2018-05-15 DIAGNOSIS — R73.9 BLOOD SUGAR INCREASED: ICD-10-CM

## 2018-05-15 RX ORDER — ATENOLOL 25 MG/1
25 TABLET ORAL DAILY
Qty: 90 TABLET | Refills: 1 | Status: SHIPPED | OUTPATIENT
Start: 2018-05-15 | End: 2018-11-20

## 2018-05-15 NOTE — TELEPHONE ENCOUNTER
"  Pt requesting refill     Requested Prescriptions   Pending Prescriptions Disp Refills     atenolol (TENORMIN) 25 MG tablet 90 tablet 1     Sig: Take 1 tablet (25 mg) by mouth daily    Beta-Blockers Protocol Passed    5/15/2018  1:04 PM       Passed - Blood pressure under 140/90 in past 12 months    BP Readings from Last 3 Encounters:   03/28/18 125/80   10/28/17 (!) 131/92   10/19/17 118/78                Passed - Patient is age 6 or older       Passed - Recent (12 mo) or future (30 days) visit within the authorizing provider's specialty    Patient had office visit in the last 12 months or has a visit in the next 30 days with authorizing provider or within the authorizing provider's specialty.  See \"Patient Info\" tab in inbasket, or \"Choose Columns\" in Meds & Orders section of the refill encounter.              "

## 2018-05-16 ENCOUNTER — OFFICE VISIT (OUTPATIENT)
Dept: FAMILY MEDICINE | Facility: CLINIC | Age: 38
End: 2018-05-16
Payer: COMMERCIAL

## 2018-05-16 VITALS
HEART RATE: 98 BPM | DIASTOLIC BLOOD PRESSURE: 82 MMHG | WEIGHT: 175 LBS | TEMPERATURE: 98.4 F | BODY MASS INDEX: 27.41 KG/M2 | OXYGEN SATURATION: 98 % | RESPIRATION RATE: 16 BRPM | SYSTOLIC BLOOD PRESSURE: 130 MMHG

## 2018-05-16 DIAGNOSIS — N64.4 BREAST PAIN: Primary | ICD-10-CM

## 2018-05-16 PROCEDURE — 99213 OFFICE O/P EST LOW 20 MIN: CPT | Performed by: NURSE PRACTITIONER

## 2018-05-16 NOTE — MR AVS SNAPSHOT
After Visit Summary   5/16/2018    Tati Hamilton    MRN: [id number]           Patient Information     Date Of Birth          1980        Visit Information        Provider Department      5/16/2018 1:00 PM Crystal Salas APRN CNP [facility]        Today's Diagnoses     Breast pain    -  1      Care Instructions    Monitor.  Reduce smoking with goal for total cessation and reduce caffeine consumption.  If any worsening please let me know.            Follow-ups after your visit        Your next 10 appointments already scheduled     Jun 13, 2018  2:45 PM CDT   PHYSICAL with Benja Persaud MD   [facility]    [address] [place] [address]  [address]  [facility] [address] [phone]              Who to contact     If you have questions or need follow up information about today's clinic visit or your schedule please contact [facility] directly at [phone].  Normal or non-critical lab and imaging results will be communicated to you by MyChart, letter or phone within 4 business days after the clinic has received the results. If you do not hear from us within 7 days, please contact the clinic through [email or web address] or phone. If you have a critical or abnormal lab result, we will notify you by phone as soon as possible.  Submit refill requests through Symphony or call your pharmacy and they will forward the refill request to us. Please allow 3 business days for your refill to be completed.          Additional Information About Your Visit        MyChart Information     Symphony gives you secure access to your electronic health record. If you see a primary care provider, you can also send messages to your care team and make appointments. If you have questions, please call your primary care clinic.  If you do not have a primary care provider, please call [phone] and they will assist you.        Care EveryWhere ID      This is your Care EveryWhere ID. This could be used by other organizations to access your [place] medical records  [id number]        Your Vitals Were     Pulse Temperature Respirations Pulse Oximetry BMI (Body Mass Index)       98 98.4  F (36.9  C) (Oral) 16 98% 27.41 kg/m2        Blood Pressure from Last 3 Encounters:   05/16/18 130/82   03/28/18 125/80   10/28/17 (!) 131/92    Weight from Last 3 Encounters:   05/16/18 175 lb (79.4 kg)   03/28/18 181 lb (82.1 kg)   10/28/17 185 lb (83.9 kg)              Today, you had the following     No orders found for display       Primary Care Provider Office Phone # Fax #    Gely Le [phone]       [address] [facility] [address]        Equal Access to Services     [facility]: Hadii aad ku hadasho Soomaali, waaxda luqadaha, qaybta kaalmada adeegyada, waxay cindyin hayaan orin ellison . So [facility] [phone].    ATENCIÓN: Si habla español, tiene a acosta disposición servicios gratuitos de asistencia lingüística. Ebename al [phone].    We comply with applicable federal civil rights laws and [place] laws. We do not discriminate on the basis of race, color, national origin, age, disability, sex, sexual orientation, or gender identity.            Thank you!     Thank you for choosing [facility]  for your care. Our goal is always to provide you with excellent care. Hearing back from our patients is one way we can continue to improve our services. Please take a few minutes to complete the written survey that you may receive in the mail after your visit with us. Thank you!             Your Updated Medication List - Protect others around you: Learn how to safely use, store and throw away your medicines at www.disposemymeds.org.          This list is accurate as of 5/16/18  1:32 PM.  Always use your most recent med list.                   Brand Name Dispense Instructions for use Diagnosis    atenolol 25 MG  tablet    TENORMIN    90 tablet    Take 1 tablet (25 mg) by mouth daily    HTN, goal below 140/90, Overweight, Vitamin D deficiency, Blood sugar increased       lisinopril 20 MG tablet    PRINIVIL/ZESTRIL    90 tablet    Take 1 tablet (20 mg) by mouth daily    Overweight, Vitamin D deficiency, HTN, goal below 140/90, Blood sugar increased       LORazepam 0.5 MG tablet    ATIVAN    30 tablet    1 po  qd prn anxiety        order for DME      Equipment being ordered: RESPIRONICS DREAM STATION WITH HH AND MIRAGE FX FOR HER NASAL MASK WITH CHINSTRAP.  PRESSURE 7-15 CM.        ranitidine 150 MG tablet    ZANTAC    180 tablet    Take 1 tablet (150 mg) by mouth 2 times daily    Esophageal reflux       vitamin D 44644 UNIT capsule    ERGOCALCIFEROL    12 capsule    Take 1 capsule (50,000 Units) by mouth once a week    Vitamin D deficiency, Overweight, HTN, goal below 140/90, Blood sugar increased

## 2018-05-16 NOTE — PROGRESS NOTES
SUBJECTIVE:   Tati Hamilton is a 37 year old female who presents to clinic today for the following health issues:      Concern - right breast pain   Onset: 2 weeks     Description:   Breast pain, right breast    Intensity: mild    Progression of Symptoms:  worsening and constant    Accompanying Signs & Symptoms:  none    Previous history of similar problem:   none    Precipitating factors:   Worsened by: none    Alleviating factors:  Improved by: none    Therapies Tried and outcome: had preliminary Mammo done 2016, has dense breast.     Typically has bilat pain in both breasts before her period.    LMP: ~2 weeks ago  Pain is over the top of the R breast and slightly tender.  Had a mammogram 2 years ago.  No symptoms just was told she should do a screening mammo.    No nipple drainage.  No lumps/bumps.   She is not breastfeeding.  No recent pregnancy.  Thinks caffeine might make pain worse.     Drinks 1-2 caffeinated drinks/day.  Recently ; increase in stress.  Used to be a social smoker, but due to increase in stress has been smoking 1/2-1 pack/day.        Problem list and histories reviewed & adjusted, as indicated.  Additional history: as documented    Current Outpatient Prescriptions   Medication Sig Dispense Refill     atenolol (TENORMIN) 25 MG tablet Take 1 tablet (25 mg) by mouth daily 90 tablet 1     lisinopril (PRINIVIL/ZESTRIL) 20 MG tablet Take 1 tablet (20 mg) by mouth daily 90 tablet 3     LORazepam (ATIVAN) 0.5 MG tablet 1 po  qd prn anxiety 30 tablet 0     order for DME Equipment being ordered: RESPIRONICS DREAM STATION WITH HH AND MIRAGE FX FOR HER NASAL MASK WITH CHINSTRAP.  PRESSURE 7-15 CM.       ranitidine (ZANTAC) 150 MG tablet Take 1 tablet (150 mg) by mouth 2 times daily 180 tablet 1     vitamin D (ERGOCALCIFEROL) 26431 UNIT capsule Take 1 capsule (50,000 Units) by mouth once a week 12 capsule 1     Allergies   Allergen Reactions     No Known Drug Allergies        Reviewed and  updated as needed this visit by clinical staff  Tobacco  Allergies  Meds  Med Hx  Surg Hx  Fam Hx  Soc Hx      Reviewed and updated as needed this visit by Provider         ROS:  Constitutional, HEENT, cardiovascular, pulmonary, gi and gu systems are negative, except as otherwise noted.    OBJECTIVE:     /82 (BP Location: Right arm, Patient Position: Chair, Cuff Size: Adult Regular)  Pulse 98  Temp 98.4  F (36.9  C) (Oral)  Resp 16  Wt 175 lb (79.4 kg)  SpO2 98%  BMI 27.41 kg/m2  Body mass index is 27.41 kg/(m^2).  GENERAL: healthy, alert and no distress  BREAST: normal without masses, tenderness or nipple discharge and no palpable axillary masses or adenopathy, mild tenderness in bilat R upper quadrants   SKIN: no suspicious lesions or rashes    Diagnostic Test Results:  none     ASSESSMENT/PLAN:   1. Breast pain  Monitor.  Decrease caffeine consumption.  Reduce smoking with goal of total cessation.  If any worsening will let me know.    Has annual physical scheduled in 1 month.       BLANCA Cedeño [facility]

## 2018-05-16 NOTE — PATIENT INSTRUCTIONS
Monitor.  Reduce smoking with goal for total cessation and reduce caffeine consumption.  If any worsening please let me know.

## 2018-06-13 ENCOUNTER — OFFICE VISIT (OUTPATIENT)
Dept: OBGYN | Facility: CLINIC | Age: 38
End: 2018-06-13
Payer: COMMERCIAL

## 2018-06-13 VITALS
BODY MASS INDEX: 27 KG/M2 | WEIGHT: 172 LBS | DIASTOLIC BLOOD PRESSURE: 80 MMHG | HEIGHT: 67 IN | SYSTOLIC BLOOD PRESSURE: 122 MMHG

## 2018-06-13 DIAGNOSIS — Z01.419 ENCOUNTER FOR GYNECOLOGICAL EXAMINATION WITHOUT ABNORMAL FINDING: ICD-10-CM

## 2018-06-13 DIAGNOSIS — Z11.3 SCREEN FOR STD (SEXUALLY TRANSMITTED DISEASE): ICD-10-CM

## 2018-06-13 DIAGNOSIS — Z12.4 SCREENING FOR CERVICAL CANCER: Primary | ICD-10-CM

## 2018-06-13 PROCEDURE — 87491 CHLMYD TRACH DNA AMP PROBE: CPT | Performed by: OBSTETRICS & GYNECOLOGY

## 2018-06-13 PROCEDURE — 88175 CYTOPATH C/V AUTO FLUID REDO: CPT | Performed by: OBSTETRICS & GYNECOLOGY

## 2018-06-13 PROCEDURE — 87624 HPV HI-RISK TYP POOLED RSLT: CPT | Performed by: OBSTETRICS & GYNECOLOGY

## 2018-06-13 PROCEDURE — 87591 N.GONORRHOEAE DNA AMP PROB: CPT | Performed by: OBSTETRICS & GYNECOLOGY

## 2018-06-13 PROCEDURE — 99395 PREV VISIT EST AGE 18-39: CPT | Performed by: OBSTETRICS & GYNECOLOGY

## 2018-06-13 NOTE — MR AVS SNAPSHOT
"              After Visit Summary   6/13/2018    Tati Hamilton    MRN: [id number]           Patient Information     Date Of Birth          1980        Visit Information        Provider Department      6/13/2018 2:45 PM Benja Persaud MD [facility]        Today's Diagnoses     Screening for cervical cancer    -  1    Encounter for gynecological examination without abnormal finding        Screen for STD (sexually transmitted disease)           Follow-ups after your visit        Who to contact     If you have questions or need follow up information about today's clinic visit or your schedule please contact [facility] directly at [phone].  Normal or non-critical lab and imaging results will be communicated to you by MyChart, letter or phone within 4 business days after the clinic has received the results. If you do not hear from us within 7 days, please contact the clinic through YPlanhart or phone. If you have a critical or abnormal lab result, we will notify you by phone as soon as possible.  Submit refill requests through Vivartes or call your pharmacy and they will forward the refill request to us. Please allow 3 business days for your refill to be completed.          Additional Information About Your Visit        MyChart Information     Vivartes gives you secure access to your electronic health record. If you see a primary care provider, you can also send messages to your care team and make appointments. If you have questions, please call your primary care clinic.  If you do not have a primary care provider, please call [phone] and they will assist you.        Care EveryWhere ID     This is your Care EveryWhere ID. This could be used by other organizations to access your [place] medical records  [id number]        Your Vitals Were     Height Last Period BMI (Body Mass Index)             5' 7\" (1.702 m) 06/06/2018 (Approximate) 26.94 kg/m2          Blood Pressure from " Last 3 Encounters:   06/13/18 122/80   05/16/18 130/82   03/28/18 125/80    Weight from Last 3 Encounters:   06/13/18 172 lb (78 kg)   05/16/18 175 lb (79.4 kg)   03/28/18 181 lb (82.1 kg)              We Performed the Following     CHLAMYDIA TRACHOMATIS PCR     HPV High Risk Types DNA Cervical     NEISSERIA GONORRHOEA PCR     Pap imaged thin layer diagnostic with HPV (select HPV order below)        Primary Care Provider Office Phone # Fax #    Gely Le [phone]       [address]  [facility] [address]        Equal Access to Services     [facility]: Hadii nilton oliver hadasho Sodutch, waaxda luqadaha, qaybta kaalmada adeshmuelyaghanshyam, laura ellison . So [facility] [phone].    ATENCIÓN: Si habla español, tiene a acosta disposición servicios gratuitos de asistencia lingüística. Llame al [phone].    We comply with applicable federal civil rights laws and [place] laws. We do not discriminate on the basis of race, color, national origin, age, disability, sex, sexual orientation, or gender identity.            Thank you!     Thank you for choosing [facility]  for your care. Our goal is always to provide you with excellent care. Hearing back from our patients is one way we can continue to improve our services. Please take a few minutes to complete the written survey that you may receive in the mail after your visit with us. Thank you!             Your Updated Medication List - Protect others around you: Learn how to safely use, store and throw away your medicines at www.disposemymeds.org.          This list is accurate as of 6/13/18  3:38 PM.  Always use your most recent med list.                   Brand Name Dispense Instructions for use Diagnosis    atenolol 25 MG tablet    TENORMIN    90 tablet    Take 1 tablet (25 mg) by mouth daily    HTN, goal below 140/90, Overweight, Vitamin D deficiency, Blood sugar increased       lisinopril 20 MG  tablet    PRINIVIL/ZESTRIL    90 tablet    Take 1 tablet (20 mg) by mouth daily    Overweight, Vitamin D deficiency, HTN, goal below 140/90, Blood sugar increased       LORazepam 0.5 MG tablet    ATIVAN    30 tablet    1 po  qd prn anxiety        order for DME      Equipment being ordered: [employer] DREAM STATION WITH HH AND MIRAGE FX FOR HER NASAL MASK WITH CHINSTRAP.  PRESSURE 7-15 CM.        ranitidine 150 MG tablet    ZANTAC    180 tablet    Take 1 tablet (150 mg) by mouth 2 times daily    Esophageal reflux       vitamin D 07840 UNIT capsule    ERGOCALCIFEROL    12 capsule    Take 1 capsule (50,000 Units) by mouth once a week    Vitamin D deficiency, Overweight, HTN, goal below 140/90, Blood sugar increased

## 2018-06-13 NOTE — PROGRESS NOTES
SUBJECTIVE:  Tati Hamilton is an 37 year old  P1 woman who presents for annual exam. Patient's last menstrual period was 2018 (approximate).       Past Medical History:   Diagnosis Date     Anxiety      Calculus of kidney      Depressive disorder      Depressive disorder, not elsewhere classified      ESTEPHANIA (generalised anxiety disorder)      High risk HPV infection 3/20/15,10/17/16    Not HPV 16 or 18, HR, Not 16 or 18     HTN, goal below 140/90      Lung nodules May 2010    Needs f/u CT 2010     Multiple sclerosis (H) Dx in      PONV (postoperative nausea and vomiting)      Vitamin D deficiencies      Past Surgical History:   Procedure Laterality Date     C NONSPECIFIC PROCEDURE      2 procedures for kidney stones     HC COLP CERVIX/UPPER VAGINA W LOOP ELEC BX CERVIX       HC REMOVAL OF TONSILS,<11 Y/O      Tonsils <12y.o.     LAPAROSCOPIC CHOLECYSTECTOMY  2014    Procedure: LAPAROSCOPIC CHOLECYSTECTOMY;  Surgeon: Sona Giraldo MD;  Location:  OR     Current Outpatient Prescriptions   Medication     atenolol (TENORMIN) 25 MG tablet     lisinopril (PRINIVIL/ZESTRIL) 20 MG tablet     LORazepam (ATIVAN) 0.5 MG tablet     order for DME     ranitidine (ZANTAC) 150 MG tablet     vitamin D (ERGOCALCIFEROL) 95584 UNIT capsule     No current facility-administered medications for this visit.      Allergies   Allergen Reactions     No Known Drug Allergies      Social History   Substance Use Topics     Smoking status: Current Every Day Smoker     Packs/day: 0.50     Years: 4.00     Types: Cigarettes     Last attempt to quit: 2007     Smokeless tobacco: Never Used      Comment: socially     Alcohol use 0.0 oz/week     0 Standard drinks or equivalent per week      Comment: occasional       Review of Systems  CONSTITUTIONAL:NEGATIVE  EYES: NEGATIVE  ENT/MOUTH: NEGATIVE  RESP: NEGATIVE  CV: NEGATIVE  GI: NEGATIVE  : NEGATIVE  MUSCULOSKELATAL: NEGATIVE  INTEGUMENTARY/SKIN:  "NEGATIVE  BREAST: NEGATIVE  ENDOCRINE: NEGATIVE  HEME/ALLERGY/IMMUNE: NEGATIVE  PSYCHIATRIC: NEGATIVE    OBJECTIVE:  /80 (BP Location: Right arm, Patient Position: Chair, Cuff Size: Adult Regular)  Ht 5' 7\" (1.702 m)  Wt 172 lb (78 kg)  LMP 06/06/2018 (Approximate)  BMI 26.94 kg/m2   EXAM:  GENERAL APPEARANCE: healthy, alert and no distress  BREAST: normal without masses, tenderness or nipple discharge and no palpable axillary masses or adenopathy  ABDOMEN: soft, nontender, without hepatosplenomegaly or masses    Pelvic Exam:  Urethra; negative  Vulva: No external lesions, normal hair distribution, no adenopathy  Vagina: Moist, pink, no abnormal discharge, well rugated, no lesions  Cervix: Pap smear is taken, parous, smooth, pink, no visible lesions  Uterus: Normal size, anteverted, non-tender, mobile  Ovaries: No mass, non-tender, mobile      ASSESSMENT:  Satisfactory annual gyn exam  Desires STD screening    PLAN:  (Z12.4) Screening for cervical cancer  (primary encounter diagnosis)  Plan: Pap imaged thin layer diagnostic with HPV         (select HPV order below), HPV High Risk Types         DNA Cervical          PE: reviewed health maintenance including diet, regular exercise and periodic exams.  Health Maintenance   Topic Date Due     PAP Q1 YR DIAGNOSTIC  01/09/2018     TETANUS Q10 YR  04/09/2018     MICROALBUMIN Q1 YEAR  08/04/2018     ESTEPHANIA QUESTIONNAIRE 1 YEAR  08/15/2018     INFLUENZA VACCINE (Season Ended) 09/01/2018     LIPID MONITORING Q5 YEARS  08/04/2022     HIV SCREEN (SYSTEM ASSIGNED)  Completed         "

## 2018-06-13 NOTE — NURSING NOTE
"Chief Complaint   Patient presents with     Physical       Initial /80 (BP Location: Right arm, Patient Position: Chair, Cuff Size: Adult Regular)  Ht 5' 7\" (1.702 m)  Wt 172 lb (78 kg)  LMP 2018 (Approximate)  BMI 26.94 kg/m2 Estimated body mass index is 26.94 kg/(m^2) as calculated from the following:    Height as of this encounter: 5' 7\" (1.702 m).    Weight as of this encounter: 172 lb (78 kg).  BP completed using cuff size: regular        The following HM Due: pap smear      Amara Mooney CMA         "

## 2018-06-14 LAB
C TRACH DNA SPEC QL NAA+PROBE: NEGATIVE
N GONORRHOEA DNA SPEC QL NAA+PROBE: NEGATIVE
SPECIMEN SOURCE: NORMAL
SPECIMEN SOURCE: NORMAL

## 2018-06-15 LAB
COPATH REPORT: NORMAL
PAP: NORMAL

## 2018-06-19 LAB
FINAL DIAGNOSIS: ABNORMAL
HPV HR 12 DNA CVX QL NAA+PROBE: POSITIVE
HPV16 DNA SPEC QL NAA+PROBE: NEGATIVE
HPV18 DNA SPEC QL NAA+PROBE: NEGATIVE
SPECIMEN DESCRIPTION: ABNORMAL
SPECIMEN SOURCE CVX/VAG CYTO: ABNORMAL

## 2018-09-06 DIAGNOSIS — E55.9 VITAMIN D DEFICIENCY: ICD-10-CM

## 2018-09-06 DIAGNOSIS — I10 HTN, GOAL BELOW 140/90: ICD-10-CM

## 2018-09-06 DIAGNOSIS — E66.3 OVERWEIGHT: ICD-10-CM

## 2018-09-06 DIAGNOSIS — R73.9 BLOOD SUGAR INCREASED: ICD-10-CM

## 2018-09-06 NOTE — TELEPHONE ENCOUNTER
"Requested Prescriptions   Pending Prescriptions Disp Refills     lisinopril (PRINIVIL/ZESTRIL) 20 MG tablet [Pharmacy Med Name: LISINOPRIL 20MG TABS] 90 tablet 3    Last Written Prescription Date:  08/15/2017  Last Fill Quantity: 90,  # refills: 3   Last office visit: 10/19/2017 with prescribing provider:     Future Office Visit:   Next 5 appointments (look out 90 days)     Sep 10, 2018  8:45 AM CDT   Colposcopy with Benja Persaud MD, RI PROC RM 2   [facility]    [address]  [address]  [facility] [address]   [phone]                Sig: TAKE ONE TABLET BY MOUTH DAILY    ACE Inhibitors (Including Combos) Protocol Passed    9/6/2018 11:28 AM       Passed - Blood pressure under 140/90 in past 12 months    BP Readings from Last 3 Encounters:   06/13/18 122/80   05/16/18 130/82   03/28/18 125/80                Passed - Recent (12 mo) or future (30 days) visit within the authorizing provider's specialty    Patient had office visit in the last 12 months or has a visit in the next 30 days with authorizing provider or within the authorizing provider's specialty.  See \"Patient Info\" tab in inbasket, or \"Choose Columns\" in Meds & Orders section of the refill encounter.           Passed - Patient is age 18 or older       Passed - No active pregnancy on record       Passed - Normal serum creatinine on file in past 12 months    Recent Labs   Lab Test  12/01/17   0958   CR  0.82            Passed - Normal serum potassium on file in past 12 months    Recent Labs   Lab Test  12/01/17   0958   POTASSIUM  4.2            Passed - No positive pregnancy test in past 12 months        "

## 2018-09-07 RX ORDER — LISINOPRIL 20 MG/1
TABLET ORAL
Qty: 90 TABLET | Refills: 0 | Status: SHIPPED | OUTPATIENT
Start: 2018-09-07 | End: 2018-12-07

## 2018-09-07 NOTE — TELEPHONE ENCOUNTER
Prescription approved per [facility] Refill Protocol.  Patient advised due for upcoming office visit

## 2018-10-16 ENCOUNTER — OFFICE VISIT (OUTPATIENT)
Dept: URGENT CARE | Facility: URGENT CARE | Age: 38
End: 2018-10-16
Payer: COMMERCIAL

## 2018-10-16 VITALS
SYSTOLIC BLOOD PRESSURE: 126 MMHG | HEART RATE: 99 BPM | TEMPERATURE: 98.3 F | DIASTOLIC BLOOD PRESSURE: 74 MMHG | WEIGHT: 172 LBS | BODY MASS INDEX: 26.94 KG/M2 | OXYGEN SATURATION: 99 %

## 2018-10-16 DIAGNOSIS — J32.9 VIRAL SINUSITIS: ICD-10-CM

## 2018-10-16 DIAGNOSIS — J06.9 VIRAL URI WITH COUGH: Primary | ICD-10-CM

## 2018-10-16 DIAGNOSIS — B97.89 VIRAL SINUSITIS: ICD-10-CM

## 2018-10-16 PROCEDURE — 99213 OFFICE O/P EST LOW 20 MIN: CPT | Performed by: FAMILY MEDICINE

## 2018-10-16 NOTE — LETTER
[facility]  [address]  [facility] [address]  Phone: [phone]  Fax: [phone]    October 16, 2018        Tati Hamilton  [address] [facility] [address]          To whom it may concern:    RE: Tati Hamilton    Patient was seen and treated today at our clinic and missed work.  Patient may return to work 10/18/2018.    Please contact me for questions or concerns.      Sincerely,        Yahir Carrillo MD

## 2018-10-16 NOTE — MR AVS SNAPSHOT
After Visit Summary   10/16/2018    Tati Hamilton    MRN: [id number]           Patient Information     Date Of Birth          1980        Visit Information        Provider Department      10/16/2018 7:15 PM Yahir Carrillo MD [facility]        Today's Diagnoses     Viral URI with cough    -  1    Viral sinusitis          Care Instructions    Try nasal saline rinses or neti pot     Use fluticasone nasal spray once a day    Take robitussin/delsym (dextromethorphan) for cough    If congested or hard time breathing the nose try afrin nasal spray (don't use for more than 7 days straight)      Return early next week if symptoms haven't gotten better            Follow-ups after your visit        Your next 10 appointments already scheduled     Nov 02, 2018  9:30 AM CDT   Colposcopy with Benja Persaud MD, RI PROC  2   [facility]    [address] [place]  [address]  [facility] [id number] [phone]           Please advise patient to take 2 Advil one hour before procedure!              Who to contact     If you have questions or need follow up information about today's clinic visit or your schedule please contact [facility] directly at [phone].  Normal or non-critical lab and imaging results will be communicated to you by [employer], letter or phone within 4 business days after the clinic has received the results. If you do not hear from us within 7 days, please contact the clinic through MyChart or phone. If you have a critical or abnormal lab result, we will notify you by phone as soon as possible.  Submit refill requests through [employer] or call your pharmacy and they will forward the refill request to us. Please allow 3 business days for your refill to be completed.          Additional Information About Your Visit        [employer] Information     [employer] gives you secure access to your  electronic health record. If you see a primary care provider, you can also send messages to your care team and make appointments. If you have questions, please call your primary care clinic.  If you do not have a primary care provider, please call [phone] and they will assist you.        Care EveryWhere ID     This is your Care EveryWhere ID. This could be used by other organizations to access your [place] medical records  [id number]        Your Vitals Were     Pulse Temperature Pulse Oximetry BMI (Body Mass Index)          99 98.3  F (36.8  C) (Oral) 99% 26.94 kg/m2         Blood Pressure from Last 3 Encounters:   10/16/18 126/74   06/13/18 122/80   05/16/18 130/82    Weight from Last 3 Encounters:   10/16/18 172 lb (78 kg)   06/13/18 172 lb (78 kg)   05/16/18 175 lb (79.4 kg)              Today, you had the following     No orders found for display       Primary Care Provider Office Phone # Fax #    Gely Sailaja Le [phone]       [address] [facility] [address]        Equal Access to Services     [facility]: Hadii aad ku hadasho Somarlonali, waaxda luqadaha, qaybta kaalmada adeegyada, laura ellison . So [facility] [phone].    ATENCIÓN: Si habla español, tiene a acosta disposición servicios gratuitos de asistencia lingüística. Llame al [phone].    We comply with applicable federal civil rights laws and [place] laws. We do not discriminate on the basis of race, color, national origin, age, disability, sex, sexual orientation, or gender identity.            Thank you!     Thank you for choosing [facility]  for your care. Our goal is always to provide you with excellent care. Hearing back from our patients is one way we can continue to improve our services. Please take a few minutes to complete the written survey that you may receive in the mail after your visit with us. Thank you!             Your Updated Medication  List - Protect others around you: Learn how to safely use, store and throw away your medicines at www.disposemymeds.org.          This list is accurate as of 10/16/18  7:59 PM.  Always use your most recent med list.                   Brand Name Dispense Instructions for use Diagnosis    atenolol 25 MG tablet    TENORMIN    90 tablet    Take 1 tablet (25 mg) by mouth daily    HTN, goal below 140/90, Overweight, Vitamin D deficiency, Blood sugar increased       lisinopril 20 MG tablet    PRINIVIL/ZESTRIL    90 tablet    TAKE ONE TABLET BY MOUTH DAILY    Overweight, Vitamin D deficiency, HTN, goal below 140/90, Blood sugar increased       LORazepam 0.5 MG tablet    ATIVAN    30 tablet    1 po  qd prn anxiety        order for DME      Equipment being ordered: RESPIRONICS DREAM STATION WITH HH AND MIRAGE FX FOR HER NASAL MASK WITH CHINSTRAP.  PRESSURE 7-15 CM.        ranitidine 150 MG tablet    ZANTAC    180 tablet    Take 1 tablet (150 mg) by mouth 2 times daily    Esophageal reflux       vitamin D 88624 UNIT capsule    ERGOCALCIFEROL    12 capsule    Take 1 capsule (50,000 Units) by mouth once a week    Vitamin D deficiency, Overweight, HTN, goal below 140/90, Blood sugar increased

## 2018-10-17 NOTE — PATIENT INSTRUCTIONS
Try nasal saline rinses or neti pot     Use fluticasone nasal spray once a day    Take robitussin/delsym (dextromethorphan) for cough    If congested or hard time breathing the nose try afrin nasal spray (don't use for more than 7 days straight)      Return early next week if symptoms haven't gotten better

## 2018-10-17 NOTE — PROGRESS NOTES
Subjective:   Tati Hamilton is a 37 year old female who presents for   Chief Complaint   Patient presents with     Urgent Care     URI     Started last Friday, sore throat, little cough, body is sore, cough worse when laying down, no fevers, ear pain, facial pressure      About 4-5 days of sore throat, cold symptoms, cough  Maxillary sinus discomfort. No recurrent hx of sinus infections. No green nasal discharge. No tooth pain. She denies any fevers. 2 times of emesis she thinks from the increased nasal drainage - this occurred once yesterday and one time today - says not related to heavy coughing.     Medications attempted: ibuprofen for discomfort but no other remedies    PMHX/PSHX/MEDS/ALLERGIES/SHX/FHX reviewed in Epic.    Patient Active Problem List    Diagnosis Date Noted     CHARLES (obstructive sleep apnea) 10/07/2015     Priority: Medium     Severe CHARLES; on CPAP       High risk HPV infection 03/20/2015     Priority: Medium     3/20/15 Pap NIL with Pos HR HPR but not HPV 16 or 18. Plan: cotest in 1 year.   10/17/16 DX NL pap, +HPV HR, pt. Is to schedule a colp.  1/9/17 colp done. No bx taken. Plan: pap in 6-12 months.   6/13/18 NIL, +HR HPV, not 16/18. Plan colp         HTN, goal below 140/90      Priority: Medium     Vitamin D deficiency      Priority: Medium     (Problem list name updated by automated process. Provider to review and confirm.)       Generalized anxiety disorder      Priority: Medium     Diagnosis updated by automated process. Provider to review and confirm.       CARDIOVASCULAR SCREENING; LDL GOAL LESS THAN 130 10/31/2010     Priority: Medium     Papanicolaou smear of cervix with low grade squamous intraepithelial lesion (LGSIL) 03/03/2008     Priority: Medium     Multiple sclerosis (H) 03/29/2005     Priority: Medium     CALCULUS OF KIDNEY(aka NEPHROLITHIASIS) 02/12/2005     Priority: Medium       Current Outpatient Prescriptions   Medication     atenolol (TENORMIN) 25 MG tablet      lisinopril (PRINIVIL/ZESTRIL) 20 MG tablet     LORazepam (ATIVAN) 0.5 MG tablet     order for DME     ranitidine (ZANTAC) 150 MG tablet     vitamin D (ERGOCALCIFEROL) 27537 UNIT capsule     No current facility-administered medications for this visit.      ROS:  As above per HPI    Objective:   /74  Pulse 99  Temp 98.3  F (36.8  C) (Oral)  Wt 172 lb (78 kg)  SpO2 99%  BMI 26.94 kg/m2, Body mass index is 26.94 kg/(m^2).  Gen:  NAD, well-nourished, sitting in chair comfortably  HEENT: EOMI, sclera anicteric, Head normocephalic, ; nares patent; moist mucous membranes, erythematous turbinates no rhinorrhea  Neck: trachea midline, no thyromegaly  CV:  Hemodynamically stable, RRR  Pulm:  no increased work of breathing , CTAB, no wheezes/rales/rhonchi   Extrem: no cyanosis, edema or clubbing  Skin: no obvious rashes or abnormalities  Psych: Euthymic, linear thoughts, normal rate of speech    Assessment & Plan:   Tati Hamilton, 37 year old female who presents with:    Viral URI with cough  Viral sinusitis  Symptoms are mild and timing of illness suggests viral process. Will hold off on antibiotics at this time and recommend saline rinses and fluticasone nasal. If symptoms don't get better and dominique if developing worsening symptoms then should be re-evaluated and antibiotics may be considered.     Yahir Carrillo MD   [facility]     Options for treatment and/or follow-up care were reviewed with the patient. Tati JANET Easter and/or legal guardian was engaged and actively involved in the decision making process. Patient/guardian verbalized understanding of the options discussed and was satisfied with the final plan.

## 2018-10-18 ENCOUNTER — OFFICE VISIT (OUTPATIENT)
Dept: INTERNAL MEDICINE | Facility: CLINIC | Age: 38
End: 2018-10-18
Payer: COMMERCIAL

## 2018-10-18 VITALS
DIASTOLIC BLOOD PRESSURE: 66 MMHG | SYSTOLIC BLOOD PRESSURE: 100 MMHG | BODY MASS INDEX: 24.81 KG/M2 | OXYGEN SATURATION: 98 % | WEIGHT: 158.1 LBS | HEIGHT: 67 IN | HEART RATE: 101 BPM | RESPIRATION RATE: 14 BRPM | TEMPERATURE: 98.1 F

## 2018-10-18 DIAGNOSIS — M79.10 MUSCLE PAIN: ICD-10-CM

## 2018-10-18 DIAGNOSIS — R05.9 COUGH: ICD-10-CM

## 2018-10-18 DIAGNOSIS — B34.9 VIRAL SYNDROME: Primary | ICD-10-CM

## 2018-10-18 PROCEDURE — 99213 OFFICE O/P EST LOW 20 MIN: CPT | Performed by: INTERNAL MEDICINE

## 2018-10-18 ASSESSMENT — ANXIETY QUESTIONNAIRES
IF YOU CHECKED OFF ANY PROBLEMS ON THIS QUESTIONNAIRE, HOW DIFFICULT HAVE THESE PROBLEMS MADE IT FOR YOU TO DO YOUR WORK, TAKE CARE OF THINGS AT HOME, OR GET ALONG WITH OTHER PEOPLE: SOMEWHAT DIFFICULT
1. FEELING NERVOUS, ANXIOUS, OR ON EDGE: MORE THAN HALF THE DAYS
2. NOT BEING ABLE TO STOP OR CONTROL WORRYING: MORE THAN HALF THE DAYS
7. FEELING AFRAID AS IF SOMETHING AWFUL MIGHT HAPPEN: SEVERAL DAYS
GAD7 TOTAL SCORE: 11
3. WORRYING TOO MUCH ABOUT DIFFERENT THINGS: MORE THAN HALF THE DAYS
5. BEING SO RESTLESS THAT IT IS HARD TO SIT STILL: NOT AT ALL
6. BECOMING EASILY ANNOYED OR IRRITABLE: MORE THAN HALF THE DAYS

## 2018-10-18 ASSESSMENT — PATIENT HEALTH QUESTIONNAIRE - PHQ9: 5. POOR APPETITE OR OVEREATING: MORE THAN HALF THE DAYS

## 2018-10-18 NOTE — MR AVS SNAPSHOT
After Visit Summary   10/18/2018    Tati Hamilton    MRN: [id number]           Patient Information     Date Of Birth          1980        Visit Information        Provider Department      10/18/2018 12:40 PM Gely Le MD [facility]        Today's Diagnoses     Viral syndrome    -  1    Muscle pain        Cough           Follow-ups after your visit        Your next 10 appointments already scheduled     Nov 02, 2018  9:30 AM CDT   Colposcopy with Benja Persaud MD, RI PROC RM 2   [facility]    [address] [place]  [address]  [facility] [id number] [phone]           Please advise patient to take 2 Advil one hour before procedure!              Who to contact     If you have questions or need follow up information about today's clinic visit or your schedule please contact [facility] directly at [phone].  Normal or non-critical lab and imaging results will be communicated to you by [employer], letter or phone within 4 business days after the clinic has received the results. If you do not hear from us within 7 days, please contact the clinic through [employer] or phone. If you have a critical or abnormal lab result, we will notify you by phone as soon as possible.  Submit refill requests through [employer] or call your pharmacy and they will forward the refill request to us. Please allow 3 business days for your refill to be completed.          Additional Information About Your Visit        MyChart Information     [employer] gives you secure access to your electronic health record. If you see a primary care provider, you can also send messages to your care team and make appointments. If you have questions, please call your primary care clinic.  If you do not have a primary care provider, please call [phone] and they will assist you.        Care EveryWhere ID     This is your Care  "EveryWhere ID. This could be used by other organizations to access your [place] medical records  [id number]        Your Vitals Were     Pulse Temperature Respirations Height Last Period Pulse Oximetry    101 98.1  F (36.7  C) (Oral) 14 5' 7\" (1.702 m) 10/16/2018 (Exact Date) 98%    Breastfeeding? BMI (Body Mass Index)                No 24.76 kg/m2           Blood Pressure from Last 3 Encounters:   10/18/18 100/66   10/16/18 126/74   06/13/18 122/80    Weight from Last 3 Encounters:   10/18/18 158 lb 1.6 oz (71.7 kg)   10/16/18 172 lb (78 kg)   06/13/18 172 lb (78 kg)               Primary Care Provider Office Phone # Fax #    Gely Sailaja Le [phone]       [address] [facility] [address]        Equal Access to Services     [facility]ILBERT[facility] HARRINGTON : Hadii aad ku hadasho Soomaali, waaxda luqadaha, qaybta kaalmada adeegyada, waxay idiin hayaan orin khlaurie ellison . So [facility] [phone].    ATENCIÓN: Si habla español, tiene a acosta disposición servicios gratuitos de asistencia lingüística. Shahram al [phone].    We comply with applicable federal civil rights laws and [place] laws. We do not discriminate on the basis of race, color, national origin, age, disability, sex, sexual orientation, or gender identity.            Thank you!     Thank you for choosing [facility]  for your care. Our goal is always to provide you with excellent care. Hearing back from our patients is one way we can continue to improve our services. Please take a few minutes to complete the written survey that you may receive in the mail after your visit with us. Thank you!             Your Updated Medication List - Protect others around you: Learn how to safely use, store and throw away your medicines at www.disposemymeds.org.          This list is accurate as of 10/18/18 11:59 PM.  Always use your most recent med list.                   Brand Name Dispense Instructions for use Diagnosis    " atenolol 25 MG tablet    TENORMIN    90 tablet    Take 1 tablet (25 mg) by mouth daily    HTN, goal below 140/90, Overweight, Vitamin D deficiency, Blood sugar increased       lisinopril 20 MG tablet    PRINIVIL/ZESTRIL    90 tablet    TAKE ONE TABLET BY MOUTH DAILY    Overweight, Vitamin D deficiency, HTN, goal below 140/90, Blood sugar increased       LORazepam 0.5 MG tablet    ATIVAN    30 tablet    1 po  qd prn anxiety        order for DME      Equipment being ordered: RESPIRONICS DREAM STATION WITH HH AND MIRAGE FX FOR HER NASAL MASK WITH CHINSTRAP.  PRESSURE 7-15 CM.        ranitidine 150 MG tablet    ZANTAC    180 tablet    Take 1 tablet (150 mg) by mouth 2 times daily    Esophageal reflux       vitamin D 66836 UNIT capsule    ERGOCALCIFEROL    12 capsule    Take 1 capsule (50,000 Units) by mouth once a week    Vitamin D deficiency, Overweight, HTN, goal below 140/90, Blood sugar increased

## 2018-10-18 NOTE — PROGRESS NOTES
"Dr Garcia's note        ASSESSMENT & PLAN:                                                      (B34.9) Viral syndrome  (primary encounter diagnosis)  Comment:   Plan: CBC with platelets differential, Comprehensive         metabolic panel, Mononucleosis screen,         Parvovirus B19 Antibody IgM, CK total            (M79.10) Muscle pain  Comment:   Plan: CBC with platelets differential, Comprehensive         metabolic panel, Mononucleosis screen,         Parvovirus B19 Antibody IgM, CK total            (R05) Cough  Comment:   Plan:      I advised her for rest and drink fluids.     Chief complaint:                                                      Body aches     SUBJECTIVE:   Tati Hamilton is a 37 year old female who presents to clinic today for the following health issues:      ED/UC Followup:    Facility:  [facility]  Date of visit: 10/16/18  Reason for visit: Diagnosed with viral URI, sinusitis  Current Status: Still has very bad pain all over her body, feels miserable     -- since Oct 12  -- also R hand and R foot are numb  -- throat pain, axillary pain   -- headache constant 5/10  -- no fever but chills  -- son had URI a week ago  -- no UTI or vag symptoms, no abd pain  -- vomited 3 times - she thinks it might be sec drainage  -- cough at night when she lies down   -- more upper resp drainage   -- much more stress at work, doesn't sleep well     Review of Systems:                                                      ROS: negative for fever, chills, cough, wheezes, chest pain, shortness of breath, vomiting, abdominal pain, leg swelling          OBJECTIVE:             Physical exam:  Blood pressure 100/66, pulse 101, temperature 98.1  F (36.7  C), temperature source Oral, resp. rate 14, height 5' 7\" (1.702 m), weight 158 lb 1.6 oz (71.7 kg), last menstrual period 10/16/2018, SpO2 98 %, not currently breastfeeding.   NAD, appears comfortable  Skin: no rashes   HEENT: PERRLA, EOMI, pink " conjunctiva, anicteric sclerae, bilateral tympanic membranes are clinically normal, oropharynx is normal color  Neck: supple, no JVD,  No thyroidmegaly. Lymph nodes nonpalpable cervical and supraclavicular.  Chest: clear to auscultation bilaterally, good respiratory effort  Heart: S1 S2, RRR, no mgr appreciated  Abdomen: soft, not tender, no hepatosplenomegaly or masses appreciated, no abdominal bruit, present bowel sounds  Extremities: no edema,   Neurologic: A, Ox3, no focal signs appreciated    PMHx: reviewed  Past Medical History:   Diagnosis Date     Anxiety      Calculus of kidney      Depressive disorder      Depressive disorder, not elsewhere classified      ESTEPHANIA (generalised anxiety disorder)      High risk HPV infection 3/20/15,10/17/16    Not HPV 16 or 18, HR, Not 16 or 18     HTN, goal below 140/90      Lung nodules May 2010    Needs f/u CT Nov 2010     Multiple sclerosis (H) Dx in 2001     PONV (postoperative nausea and vomiting)      Vitamin D deficiencies       PSHx: reviewed  Past Surgical History:   Procedure Laterality Date     C NONSPECIFIC PROCEDURE      2 procedures for kidney stones     HC COLP CERVIX/UPPER VAGINA W LOOP ELEC BX CERVIX       HC REMOVAL OF TONSILS,<13 Y/O      Tonsils <12y.o.     LAPAROSCOPIC CHOLECYSTECTOMY  5/8/2014    Procedure: LAPAROSCOPIC CHOLECYSTECTOMY;  Surgeon: Sona Giraldo MD;  Location:  OR        Meds: reviewed  Current Outpatient Prescriptions   Medication Sig Dispense Refill     atenolol (TENORMIN) 25 MG tablet Take 1 tablet (25 mg) by mouth daily 90 tablet 1     lisinopril (PRINIVIL/ZESTRIL) 20 MG tablet TAKE ONE TABLET BY MOUTH DAILY 90 tablet 0     LORazepam (ATIVAN) 0.5 MG tablet 1 po  qd prn anxiety 30 tablet 0     order for DME Equipment being ordered: RESPIRONICS DREAM STATION WITH HH AND MIRAGE FX FOR HER NASAL MASK WITH CHINSTRAP.  PRESSURE 7-15 CM.       ranitidine (ZANTAC) 150 MG tablet Take 1 tablet (150 mg) by mouth 2 times daily 180  tablet 1     vitamin D (ERGOCALCIFEROL) 83245 UNIT capsule Take 1 capsule (50,000 Units) by mouth once a week 12 capsule 1       Soc Hx: reviewed  Crow Hx: reviewed          Gely Garcia MD  Internal Medicine

## 2018-10-18 NOTE — NURSING NOTE
"/66 (BP Location: Left arm, Patient Position: Sitting, Cuff Size: Adult Large)  Pulse 101  Temp 98.1  F (36.7  C) (Oral)  Resp 14  Ht 5' 7\" (1.702 m)  Wt 158 lb 1.6 oz (71.7 kg)  LMP 10/16/2018 (Exact Date)  SpO2 98%  Breastfeeding? No  BMI 24.76 kg/m2  Cinthia Quintana CMA    "

## 2018-10-19 ASSESSMENT — ANXIETY QUESTIONNAIRES: GAD7 TOTAL SCORE: 11

## 2018-11-02 ENCOUNTER — OFFICE VISIT (OUTPATIENT)
Dept: OBGYN | Facility: CLINIC | Age: 38
End: 2018-11-02
Payer: COMMERCIAL

## 2018-11-02 VITALS
SYSTOLIC BLOOD PRESSURE: 122 MMHG | BODY MASS INDEX: 25.11 KG/M2 | WEIGHT: 160 LBS | DIASTOLIC BLOOD PRESSURE: 76 MMHG | HEIGHT: 67 IN | HEART RATE: 80 BPM

## 2018-11-02 DIAGNOSIS — B97.7 HIGH RISK HPV INFECTION: Primary | ICD-10-CM

## 2018-11-02 PROCEDURE — 57452 EXAM OF CERVIX W/SCOPE: CPT | Performed by: OBSTETRICS & GYNECOLOGY

## 2018-11-02 RX ORDER — CITALOPRAM HYDROBROMIDE 20 MG/1
TABLET ORAL
COMMUNITY
Start: 2018-08-24 | End: 2019-03-11

## 2018-11-02 NOTE — PROGRESS NOTES
Tati Hamilton is a 37 year old female who presents for repeat colposcopy       Pap smear normal     HPV-HR    Past Medical History:   Diagnosis Date     Anxiety      Calculus of kidney      Depressive disorder      Depressive disorder, not elsewhere classified      ESTEPHANIA (generalised anxiety disorder)      High risk HPV infection 3/20/15,10/17/16    Not HPV 16 or 18, HR, Not 16 or 18     HTN, goal below 140/90      Lung nodules May 2010    Needs f/u CT Nov 2010     Multiple sclerosis (H) Dx in 2001     PONV (postoperative nausea and vomiting)      Vitamin D deficiencies      Family History   Problem Relation Age of Onset     Gallbladder Disease Mother      Other - See Comments Mother      varicose veins      Hypertension Father      Rectal Cancer Maternal Grandfather      Cerebrovascular Disease Maternal Grandmother      HEART DISEASE Paternal Grandfather        Previous history of abnormal paps?: Yes        Patient's last menstrual period was 10/16/2018 (exact date).      History   Smoking Status     Current Every Day Smoker     Packs/day: 0.50     Years: 4.00     Types: Cigarettes     Last attempt to quit: 4/28/2007   Smokeless Tobacco     Never Used     Comment: socially       Allergies as of 11/02/2018 - Elías as Reviewed 11/02/2018   Allergen Reaction Noted     No known drug allergies  09/20/2002       PROCEDURE:  Before the procedure, it was ensured that the patient was educated regarding the nature of her findings to date, the implications of them, and what was to be done. She has been made aware of the role of HPV, the natural history of infection, ways to minimize her future risk, the effect of HPV on the cervix, and treatment options available should they be indicated. The pathophysiology of the cervix, including a discussion of squamous vs. endometrial cells, and squamous metaplasia have all been reviewed, using illustrations and sketches. The details of the colposcopic procedure were reviewed, as well  as the risks of missed diagnoses, pain, infection and bleeding. All questions were answered before proceeding, and informed consent was therefore obtained.      Pap repeated?:  No  SCJ seen?:  yes  Endocervical speculum needed?:  No  ECC done?:  No  EndoPap done?:  NO  Lugol's solution used?:  No  Satisfactory examination?:  yes    Vaginal vault: normal to cursory inspection   Urethra normal?:  yes  Labia normal?:  yes  Perineum normal?:  yes  Rectum normal?:  yes    FINDINGS:  Please see image   Cervix: no visible lesions  Procedure: biopsies taken (not including ECC): 0.    Procedure Note:      -cervix; visualized with lighted speculum      -painted with vinegar      -colposcopic exam of upper vagina and cervix          -no visible lesions  All instruments removed from vagina    Assessment: Normal exam without visible pathology      Plan: repeat pap in one year

## 2018-11-02 NOTE — MR AVS SNAPSHOT
"              After Visit Summary   11/2/2018    Tati Hamilton    MRN: [id number]           Patient Information     Date Of Birth          1980        Visit Information        Provider Department      11/2/2018 9:30 AM Benja Persaud MD; [facility]        Today's Diagnoses     High risk HPV infection    -  1       Follow-ups after your visit        Who to contact     If you have questions or need follow up information about today's clinic visit or your schedule please contact [facility] directly at [phone].  Normal or non-critical lab and imaging results will be communicated to you by Waizyhart, letter or phone within 4 business days after the clinic has received the results. If you do not hear from us within 7 days, please contact the clinic through PicBadgest or phone. If you have a critical or abnormal lab result, we will notify you by phone as soon as possible.  Submit refill requests through The Smartphone Physical or call your pharmacy and they will forward the refill request to us. Please allow 3 business days for your refill to be completed.          Additional Information About Your Visit        MyChart Information     The [employer] gives you secure access to your electronic health record. If you see a primary care provider, you can also send messages to your care team and make appointments. If you have questions, please call your primary care clinic.  If you do not have a primary care provider, please call [phone] and they will assist you.        Care EveryWhere ID     This is your Care EveryWhere ID. This could be used by other organizations to access your [place] medical records  [id number]        Your Vitals Were     Pulse Height Last Period Breastfeeding? BMI (Body Mass Index)       80 5' 7\" (1.702 m) 10/16/2018 (Exact Date) No 25.06 kg/m2        Blood Pressure from Last 3 Encounters:   11/02/18 122/76   10/18/18 100/66   10/16/18 126/74    Weight from " Last 3 Encounters:   11/02/18 160 lb (72.6 kg)   10/18/18 158 lb 1.6 oz (71.7 kg)   10/16/18 172 lb (78 kg)              We Performed the Following     COLP CERVIX/UPPER VAGINA        Primary Care Provider Office Phone # Fax #    Gely Le [phone]       [address] [facility] [address]        Equal Access to Services     [facility] : Hadii aad ku hadasho Soomaali, waaxda luqadaha, qaybta kaalmada adeegyada, waxay idiin hayaan adeeg kharash la'aan . So [facility] [phone].    ATENCIÓN: Si habla español, tiene a acosta disposición servicios gratuitos de asistencia lingüística. Ll[facility] [phone].    We comply with applicable federal civil rights laws and [place] laws. We do not discriminate on the basis of race, color, national origin, age, disability, sex, sexual orientation, or gender identity.            Thank you!     Thank you for choosing [facility]  for your care. Our goal is always to provide you with excellent care. Hearing back from our patients is one way we can continue to improve our services. Please take a few minutes to complete the written survey that you may receive in the mail after your visit with us. Thank you!             Your Updated Medication List - Protect others around you: Learn how to safely use, store and throw away your medicines at www.disposemymeds.org.          This list is accurate as of 11/2/18 10:02 AM.  Always use your most recent med list.                   Brand Name Dispense Instructions for use Diagnosis    atenolol 25 MG tablet    TENORMIN    90 tablet    Take 1 tablet (25 mg) by mouth daily    HTN, goal below 140/90, Overweight, Vitamin D deficiency, Blood sugar increased       citalopram 20 MG tablet    celeXA          lisinopril 20 MG tablet    PRINIVIL/ZESTRIL    90 tablet    TAKE ONE TABLET BY MOUTH DAILY    Overweight, Vitamin D deficiency, HTN, goal below 140/90, Blood sugar increased       LORazepam  0.5 MG tablet    ATIVAN    30 tablet    1 po  qd prn anxiety        order for DME      Equipment being ordered: RESPIRONICS DREAM STATION WITH HH AND MIRAGE FX FOR HER NASAL MASK WITH CHINSTRAP.  PRESSURE 7-15 CM.        ranitidine 150 MG tablet    ZANTAC    180 tablet    Take 1 tablet (150 mg) by mouth 2 times daily    Esophageal reflux       vitamin D2 88652 UNIT capsule    ERGOCALCIFEROL    12 capsule    Take 1 capsule (50,000 Units) by mouth once a week    Vitamin D deficiency, Overweight, HTN, goal below 140/90, Blood sugar increased

## 2018-11-02 NOTE — NURSING NOTE
"Chief Complaint   Patient presents with     Colposcopy     Normal pap with positive HPV x 2 yrs.        Initial /76  Pulse 80  Ht 5' 7\" (1.702 m)  Wt 160 lb (72.6 kg)  LMP 10/16/2018 (Exact Date)  Breastfeeding? No  BMI 25.06 kg/m2 Estimated body mass index is 25.06 kg/(m^2) as calculated from the following:    Height as of this encounter: 5' 7\" (1.702 m).    Weight as of this encounter: 160 lb (72.6 kg).  BP completed using cuff size: regular    Questioned patient about current smoking habits.  Pt. currently smokes.  Advised about smoking cessation.          Oriana Felix LPN               "

## 2018-11-20 DIAGNOSIS — I10 HTN, GOAL BELOW 140/90: ICD-10-CM

## 2018-11-20 DIAGNOSIS — R73.9 BLOOD SUGAR INCREASED: ICD-10-CM

## 2018-11-20 DIAGNOSIS — E55.9 VITAMIN D DEFICIENCY: ICD-10-CM

## 2018-11-20 DIAGNOSIS — E66.3 OVERWEIGHT: ICD-10-CM

## 2018-11-20 RX ORDER — ATENOLOL 25 MG/1
25 TABLET ORAL DAILY
Qty: 90 TABLET | Refills: 0 | Status: SHIPPED | OUTPATIENT
Start: 2018-11-20 | End: 2018-12-07

## 2018-11-20 NOTE — TELEPHONE ENCOUNTER
"Requested Prescriptions   Pending Prescriptions Disp Refills     atenolol (TENORMIN) 25 MG tablet 90 tablet 1     Sig: Take 1 tablet (25 mg) by mouth daily    Beta-Blockers Protocol Passed    11/20/2018  2:56 PM       Passed - Blood pressure under 140/90 in past 12 months    BP Readings from Last 3 Encounters:   11/02/18 122/76   10/18/18 100/66   10/16/18 126/74                Passed - Patient is age 6 or older       Passed - Recent (12 mo) or future (30 days) visit within the authorizing provider's specialty    Patient had office visit in the last 12 months or has a visit in the next 30 days with authorizing provider or within the authorizing provider's specialty.  See \"Patient Info\" tab in inbasket, or \"Choose Columns\" in Meds & Orders section of the refill encounter.              Last office visit 10/19/18. Patient aware due for office visit. Would like to wait for new year for ne w deductible.   Prescription approved per [facility] Refill Protocol.    "

## 2018-12-07 DIAGNOSIS — I10 HTN, GOAL BELOW 140/90: ICD-10-CM

## 2018-12-07 DIAGNOSIS — E55.9 VITAMIN D DEFICIENCY: ICD-10-CM

## 2018-12-07 DIAGNOSIS — R73.9 BLOOD SUGAR INCREASED: ICD-10-CM

## 2018-12-07 DIAGNOSIS — E66.3 OVERWEIGHT: ICD-10-CM

## 2018-12-07 RX ORDER — LISINOPRIL 20 MG/1
20 TABLET ORAL DAILY
Qty: 90 TABLET | Refills: 0 | Status: SHIPPED | OUTPATIENT
Start: 2018-12-07 | End: 2019-01-07

## 2018-12-07 RX ORDER — ATENOLOL 25 MG/1
25 TABLET ORAL DAILY
Qty: 90 TABLET | Refills: 0 | Status: SHIPPED | OUTPATIENT
Start: 2018-12-07 | End: 2019-02-15

## 2018-12-27 DIAGNOSIS — K21.9 ESOPHAGEAL REFLUX: ICD-10-CM

## 2018-12-27 NOTE — TELEPHONE ENCOUNTER
"Patient calling asking for a refill on:    Requested Prescriptions   Pending Prescriptions Disp Refills     ranitidine (ZANTAC) 150 MG tablet 180 tablet 1     Sig: Take 1 tablet (150 mg) by mouth 2 times daily    H2 Blockers Protocol Passed - 12/27/2018  9:45 AM       Passed - Patient is age 12 or older       Passed - Recent (12 mo) or future (30 days) visit within the authorizing provider's specialty    Patient had office visit in the last 12 months or has a visit in the next 30 days with authorizing provider or within the authorizing provider's specialty.  See \"Patient Info\" tab in inbasket, or \"Choose Columns\" in Meds & Orders section of the refill encounter.                Medication is being filled for 1 time refill only due to:  due for appt.  Patient aware.      "

## 2019-01-07 RX ORDER — LISINOPRIL 20 MG/1
20 TABLET ORAL DAILY
Qty: 90 TABLET | Refills: 0 | Status: SHIPPED | OUTPATIENT
Start: 2019-01-07 | End: 2019-03-26

## 2019-02-15 DIAGNOSIS — R73.9 BLOOD SUGAR INCREASED: ICD-10-CM

## 2019-02-15 DIAGNOSIS — E66.3 OVERWEIGHT: ICD-10-CM

## 2019-02-15 DIAGNOSIS — E55.9 VITAMIN D DEFICIENCY: ICD-10-CM

## 2019-02-15 DIAGNOSIS — I10 HTN, GOAL BELOW 140/90: ICD-10-CM

## 2019-02-15 NOTE — TELEPHONE ENCOUNTER
"Requested Prescriptions   Pending Prescriptions Disp Refills     atenolol (TENORMIN) 25 MG tablet [Pharmacy Med Name: ATENOLOL 25MG TABS] 90 tablet 0    Last Written Prescription Date:  12/07/2018  Last Fill Quantity: 90,  # refills: 0   Last office visit: 10/18/2018 with prescribing provider:     Future Office Visit:   Sig: TAKE ONE TABLET BY MOUTH ONCE DAILY    Beta-Blockers Protocol Passed - 2/15/2019 10:12 AM       Passed - Blood pressure under 140/90 in past 12 months    BP Readings from Last 3 Encounters:   11/02/18 122/76   10/18/18 100/66   10/16/18 126/74                Passed - Patient is age 6 or older       Passed - Recent (12 mo) or future (30 days) visit within the authorizing provider's specialty    Patient had office visit in the last 12 months or has a visit in the next 30 days with authorizing provider or within the authorizing provider's specialty.  See \"Patient Info\" tab in inbasket, or \"Choose Columns\" in Meds & Orders section of the refill encounter.             Passed - Medication is active on med list        "

## 2019-02-18 RX ORDER — ATENOLOL 25 MG/1
TABLET ORAL
Qty: 90 TABLET | Refills: 0 | Status: SHIPPED | OUTPATIENT
Start: 2019-02-18 | End: 2019-07-03

## 2019-02-19 NOTE — TELEPHONE ENCOUNTER
Per last refill request 11/20/18:  Last office visit 10/19/18. Patient aware due for office visit. Would like to wait for new year for new deductible.     Medication is being filled for 1 time refill only due to:  Patient needs to be seen because due for annual ov.    [employer] message sent to remind patient she is due for appointment.

## 2019-03-11 ENCOUNTER — OFFICE VISIT (OUTPATIENT)
Dept: INTERNAL MEDICINE | Facility: CLINIC | Age: 39
End: 2019-03-11
Payer: COMMERCIAL

## 2019-03-11 ENCOUNTER — TELEPHONE (OUTPATIENT)
Dept: INTERNAL MEDICINE | Facility: CLINIC | Age: 39
End: 2019-03-11

## 2019-03-11 VITALS
HEIGHT: 67 IN | SYSTOLIC BLOOD PRESSURE: 140 MMHG | OXYGEN SATURATION: 98 % | TEMPERATURE: 98.5 F | RESPIRATION RATE: 18 BRPM | DIASTOLIC BLOOD PRESSURE: 90 MMHG | WEIGHT: 155.8 LBS | BODY MASS INDEX: 24.45 KG/M2 | HEART RATE: 90 BPM

## 2019-03-11 DIAGNOSIS — I10 HTN, GOAL BELOW 140/90: ICD-10-CM

## 2019-03-11 DIAGNOSIS — Z00.00 ROUTINE GENERAL MEDICAL EXAMINATION AT A HEALTH CARE FACILITY: Primary | ICD-10-CM

## 2019-03-11 DIAGNOSIS — G35 MULTIPLE SCLEROSIS (H): ICD-10-CM

## 2019-03-11 DIAGNOSIS — E55.9 VITAMIN D DEFICIENCY: ICD-10-CM

## 2019-03-11 DIAGNOSIS — F32.0 MILD SINGLE CURRENT EPISODE OF MAJOR DEPRESSIVE DISORDER (H): ICD-10-CM

## 2019-03-11 PROCEDURE — 99395 PREV VISIT EST AGE 18-39: CPT | Performed by: INTERNAL MEDICINE

## 2019-03-11 ASSESSMENT — MIFFLIN-ST. JEOR: SCORE: 1419.33

## 2019-03-11 NOTE — PROGRESS NOTES
Dr Garcia's note      ASSESSMENT & PLAN:                                                      (Z00.00) Routine general medical examination at a health care facility  (primary encounter diagnosis)  Comment:   Plan:     (G35) Multiple sclerosis (H)  Comment: stable   Plan:     (F32.0) Mild single current episode of major depressive disorder (H)  Comment: stable   Plan:        Chief Complaint:                                                        Annual exam    SUBJECTIVE:                                                    History of present illness     She feels stable  No new complaints        ROS:   General: Negative for fever, chills, major weight changes, fatigue  Skin: Negative for rashes, abnormal spots  Eyes: Negative for blurred or double vision  ENT/mouth: Negative for sinuses discomfort, earache, sore throat  Respiratory: Negative for cough, wheezes, chronic lung disease  Cardiovascular: Negative for rest or exertional chest pain, shortness of breath, palpitations, leg edema,   Gastrointestinal: Negative for vomiting, abdominal pain, heartburn, blood in stool, diarrhea, constipation  Genitourinary: Negative for urinary frequency, blood in urine, history of kidney stones  Female: Negative for abnormal vaginal bleeding, vaginal discharge  Neuro: Negative for headaches, numbness, tingling, weakness in arms or legs, history of seizure, recent syncope  Psychiatry: Negative for depression, anxiety, suicidal thoughts  Endo: Negative for known thyroid disease, diabetes.  Hemato/Lymph: Negative for nodes, easy bleeding, history of DVT, blood transfusion  Musculoskeletal: Negative for joint swelling, back pain      PMHx: - reviewed  Past Medical History:   Diagnosis Date     Anxiety      Calculus of kidney      Depressive disorder      Depressive disorder, not elsewhere classified      ESTEPHANIA (generalised anxiety disorder)      High risk HPV infection 3/20/15,10/17/16    Not HPV 16 or 18, HR, Not 16 or 18     HTN, goal  below 140/90      Lung nodules May 2010    Needs f/u CT 2010     Multiple sclerosis (H) Dx in      PONV (postoperative nausea and vomiting)      Vitamin D deficiencies        PSHx: reviewed  Past Surgical History:   Procedure Laterality Date     C NONSPECIFIC PROCEDURE      2 procedures for kidney stones     HC COLP CERVIX/UPPER VAGINA W LOOP ELEC BX CERVIX       HC REMOVAL OF TONSILS,<11 Y/O      Tonsils <12y.o.     LAPAROSCOPIC CHOLECYSTECTOMY  2014    Procedure: LAPAROSCOPIC CHOLECYSTECTOMY;  Surgeon: Sona Giraldo MD;  Location: [facility] OR        Soc Hx: No daily alcohol, no smoking  Social History     Socioeconomic History     Marital status:      Spouse name: Not on file     Number of children: Not on file     Years of education: Not on file     Highest education level: Not on file   Occupational History     Not on file   Social Needs     Financial resource strain: Not on file     Food insecurity:     Worry: Not on file     Inability: Not on file     Transportation needs:     Medical: Not on file     Non-medical: Not on file   Tobacco Use     Smoking status: Current Every Day Smoker     Packs/day: 0.50     Years: 4.00     Pack years: 2.00     Types: Cigarettes     Last attempt to quit: 2007     Years since quittin.8     Smokeless tobacco: Never Used     Tobacco comment: socially   Substance and Sexual Activity     Alcohol use: Yes     Alcohol/week: 0.0 oz     Comment: occasional     Drug use: No     Sexual activity: No     Partners: Male     Birth control/protection: None     Comment: vasectomy   Lifestyle     Physical activity:     Days per week: Not on file     Minutes per session: Not on file     Stress: Not on file   Relationships     Social connections:     Talks on phone: Not on file     Gets together: Not on file     Attends Baptism service: Not on file     Active member of club or organization: Not on file     Attends meetings of clubs or organizations: Not on file  "    Relationship status: Not on file     Intimate partner violence:     Fear of current or ex partner: Not on file     Emotionally abused: Not on file     Physically abused: Not on file     Forced sexual activity: Not on file   Other Topics Concern     Parent/sibling w/ CABG, MI or angioplasty before 65F 55M? Not Asked   Social History Narrative     Not on file        Fam Hx: reviewed  Family History   Problem Relation Age of Onset     Gallbladder Disease Mother      Other - See Comments Mother         varicose veins      Hypertension Father      Rectal Cancer Maternal Grandfather      Cerebrovascular Disease Maternal Grandmother      Heart Disease Paternal Grandfather          Screening: reviewed      All: reviewed    Meds: reviewed  Current Outpatient Medications   Medication Sig Dispense Refill     atenolol (TENORMIN) 25 MG tablet TAKE ONE TABLET BY MOUTH ONCE DAILY 90 tablet 0     lisinopril (PRINIVIL/ZESTRIL) 20 MG tablet Take 1 tablet (20 mg) by mouth daily 90 tablet 0     LORazepam (ATIVAN) 0.5 MG tablet 1 po  qd prn anxiety 30 tablet 0     ranitidine (ZANTAC) 150 MG tablet Take 1 tablet (150 mg) by mouth 2 times daily 180 tablet 0     order for DME Equipment being ordered: RESPIRONICS DREAM STATION WITH HH AND MIRAGE FX FOR HER NASAL MASK WITH CHINSTRAP.  PRESSURE 7-15 CM.       vitamin D (ERGOCALCIFEROL) 74585 UNIT capsule Take 1 capsule (50,000 Units) by mouth once a week 12 capsule 1       OBJECTIVE:                                                    Physical Exam :  Blood pressure 140/90, pulse 90, temperature 98.5  F (36.9  C), temperature source Oral, resp. rate 18, height 1.702 m (5' 7\"), weight 70.7 kg (155 lb 12.8 oz), last menstrual period 03/01/2019, SpO2 98 %, not currently breastfeeding.     NAD, appears comfortable  Skin clear, no rashes  HEENT: PERRLA, EOMI, anicteric sclera, pink conjunctiva, external ears appear normal, bilateral tympanic membranes clinically normal, oropharynx normal " color.  Neck: supple, no JVD,  no thyroidmegaly  Lymph nodes non palpable in the cervical, supraclavicular axillaries, inguinal areas  Chest: clear to auscultation with good respiratory effort  Cardiac: S1S2, RRR, no mgr appreciated  Abdomen: soft, not tender, not distended, audible bowel sound, no hepatosplenomegaly, no palpable masses, no abdominal bruits  Extremities: no cyanosis, clubbing or edema.   Neuro: A, Ox3, no focal signs.  Breast exam deferred - done w OBGYN    Pelvic exam: deferred, done with OBGYN MD Gely Garcia MD  Internal Medicine          SUBJECTIVE:   CC: Tati Hamilton is an 38 year old woman who presents for preventive health visit.         Healthy Habits:    Do you get at least three servings of calcium containing foods daily (dairy, green leafy vegetables, etc.)? yes    Amount of exercise or daily activities, outside of work: none    Problems taking medications regularly No    Medication side effects: No    Have you had an eye exam in the past two years? yes    Do you see a dentist twice per year? yes    Do you have sleep apnea, excessive snoring or daytime drowsiness?Uses c-pap          Today's PHQ-2 Score:   PHQ-2 (  [employer]) 8/15/2017 2015   Q1: Little interest or pleasure in doing things 0 0   Q2: Feeling down, depressed or hopeless 0 0   PHQ-2 Score 0 0       Abuse: Current or Past(Physical, Sexual or Emotional)- No  Do you feel safe in your environment? Yes    Social History     Tobacco Use     Smoking status: Current Every Day Smoker     Packs/day: 0.50     Years: 4.00     Pack years: 2.00     Types: Cigarettes     Last attempt to quit: 2007     Years since quittin.8     Smokeless tobacco: Never Used     Tobacco comment: socially   Substance Use Topics     Alcohol use: Yes     Alcohol/week: 0.0 oz     Comment: occasional     If you drink alcohol do you typically have >3 drinks per day or >7 drinks per week? No                     Reviewed orders with  "patient.  Reviewed health maintenance and updated orders accordingly - Yes          Pertinent mammograms are reviewed under the imaging tab.  History of abnormal Pap smear:   PAP / HPV Latest Ref Rng & Units 6/13/2018 10/17/2016 3/20/2015   PAP - NIL NIL NIL   HPV 16 DNA NEG:Negative Negative Negative Negative   HPV 18 DNA NEG:Negative Negative Negative Negative   OTHER HR HPV NEG:Negative Positive(A) Positive(A) Positive(A)     Reviewed and updated as needed this visit by clinical staff  Tobacco  Med Hx  Surg Hx  Fam Hx  Soc Hx        Reviewed and updated as needed this visit by Provider        COUNSELING:   Reviewed preventive health counseling, as reflected in patient instructions       Regular exercise       Healthy diet/nutrition    BP Readings from Last 1 Encounters:   11/02/18 122/76     Estimated body mass index is 25.06 kg/m  as calculated from the following:    Height as of 11/2/18: 1.702 m (5' 7\").    Weight as of 11/2/18: 72.6 kg (160 lb).           reports that she has been smoking cigarettes.  She has a 2.00 pack-year smoking history. she has never used smokeless tobacco.  Tobacco Cessation Action Plan: Self help information given to patient    Counseling Resources:  ATP IV Guidelines  Pooled Cohorts Equation Calculator  Breast Cancer Risk Calculator  FRAX Risk Assessment  ICSI Preventive Guidelines  Dietary Guidelines for Americans, 2010  USDA's MyPlate  ASA Prophylaxis  Lung CA Screening    Gely Le MD  [facility]  "

## 2019-03-11 NOTE — NURSING NOTE
"/90 (BP Location: Right arm, Patient Position: Sitting, Cuff Size: Adult Regular)   Pulse 90   Temp 98.5  F (36.9  C) (Oral)   Resp 18   Ht 1.702 m (5' 7\")   Wt 70.7 kg (155 lb 12.8 oz)   LMP 03/01/2019 (Approximate)   SpO2 98%   Breastfeeding? No   BMI 24.40 kg/m    Cinthia Quintana CMA    "

## 2019-03-26 DIAGNOSIS — I10 HTN, GOAL BELOW 140/90: ICD-10-CM

## 2019-03-26 DIAGNOSIS — R73.9 BLOOD SUGAR INCREASED: ICD-10-CM

## 2019-03-26 DIAGNOSIS — E55.9 VITAMIN D DEFICIENCY: ICD-10-CM

## 2019-03-26 DIAGNOSIS — E66.3 OVERWEIGHT: ICD-10-CM

## 2019-03-26 RX ORDER — LISINOPRIL 20 MG/1
20 TABLET ORAL DAILY
Qty: 90 TABLET | Refills: 0 | Status: SHIPPED | OUTPATIENT
Start: 2019-03-26 | End: 2019-07-03

## 2019-03-26 NOTE — TELEPHONE ENCOUNTER
"Requested Prescriptions   Pending Prescriptions Disp Refills     lisinopril (PRINIVIL/ZESTRIL) 20 MG tablet 90 tablet 0     Sig: Take 1 tablet (20 mg) by mouth daily    ACE Inhibitors (Including Combos) Protocol Failed - 3/26/2019  1:52 PM       Failed - Blood pressure under 140/90 in past 12 months    BP Readings from Last 3 Encounters:   03/11/19 140/90   11/02/18 122/76   10/18/18 100/66                Failed - Normal serum creatinine on file in past 12 months    Recent Labs   Lab Test 12/01/17  0958   CR 0.82            Failed - Normal serum potassium on file in past 12 months    Recent Labs   Lab Test 12/01/17  0958   POTASSIUM 4.2            Passed - Recent (12 mo) or future (30 days) visit within the authorizing provider's specialty    Patient had office visit in the last 12 months or has a visit in the next 30 days with authorizing provider or within the authorizing provider's specialty.  See \"Patient Info\" tab in inbasket, or \"Choose Columns\" in Meds & Orders section of the refill encounter.             Passed - Medication is active on med list       Passed - Patient is age 18 or older       Passed - No active pregnancy on record       Passed - No positive pregnancy test within past 12 months        Verbal order from Dr. Garcia to fill this rx.  Patient reminded that she is due for labs.  MING Hoskins R.N.    "

## 2019-04-23 DIAGNOSIS — K21.9 ESOPHAGEAL REFLUX: ICD-10-CM

## 2019-04-23 NOTE — TELEPHONE ENCOUNTER
"Requested Prescriptions   Pending Prescriptions Disp Refills     ranitidine (ZANTAC) 150 MG tablet 180 tablet 0     Sig: Take 1 tablet (150 mg) by mouth 2 times daily       H2 Blockers Protocol Passed - 4/23/2019  3:18 PM        Passed - Patient is age 12 or older        Passed - Recent (12 mo) or future (30 days) visit within the authorizing provider's specialty     Patient had office visit in the last 12 months or has a visit in the next 30 days with authorizing provider or within the authorizing provider's specialty.  See \"Patient Info\" tab in inbasket, or \"Choose Columns\" in Meds & Orders section of the refill encounter.              Passed - Medication is active on med list        Prescription approved per [facility] Refill Protocol.    "

## 2019-06-13 ENCOUNTER — TELEPHONE (OUTPATIENT)
Dept: INTERNAL MEDICINE | Facility: CLINIC | Age: 39
End: 2019-06-13

## 2019-06-13 DIAGNOSIS — J06.9 UPPER RESPIRATORY TRACT INFECTION, UNSPECIFIED TYPE: Primary | ICD-10-CM

## 2019-06-13 RX ORDER — CODEINE PHOSPHATE AND GUAIFENESIN 10; 100 MG/5ML; MG/5ML
1-2 SOLUTION ORAL EVERY 4 HOURS PRN
Qty: 240 ML | Refills: 1 | Status: SHIPPED | OUTPATIENT
Start: 2019-06-13 | End: 2019-12-12

## 2019-06-14 ENCOUNTER — E-VISIT (OUTPATIENT)
Dept: INTERNAL MEDICINE | Facility: CLINIC | Age: 39
End: 2019-06-14

## 2019-06-14 DIAGNOSIS — F41.1 GENERALIZED ANXIETY DISORDER: Primary | ICD-10-CM

## 2019-06-14 PROCEDURE — 99207 ZZC NO BILLABLE SERVICE THIS VISIT: CPT | Performed by: INTERNAL MEDICINE

## 2019-06-14 ASSESSMENT — ANXIETY QUESTIONNAIRES
4. TROUBLE RELAXING: MORE THAN HALF THE DAYS
2. NOT BEING ABLE TO STOP OR CONTROL WORRYING: MORE THAN HALF THE DAYS
3. WORRYING TOO MUCH ABOUT DIFFERENT THINGS: MORE THAN HALF THE DAYS
6. BECOMING EASILY ANNOYED OR IRRITABLE: SEVERAL DAYS
GAD7 TOTAL SCORE: 11
7. FEELING AFRAID AS IF SOMETHING AWFUL MIGHT HAPPEN: SEVERAL DAYS
7. FEELING AFRAID AS IF SOMETHING AWFUL MIGHT HAPPEN: SEVERAL DAYS
5. BEING SO RESTLESS THAT IT IS HARD TO SIT STILL: MORE THAN HALF THE DAYS
1. FEELING NERVOUS, ANXIOUS, OR ON EDGE: SEVERAL DAYS

## 2019-06-14 ASSESSMENT — PATIENT HEALTH QUESTIONNAIRE - PHQ9
SUM OF ALL RESPONSES TO PHQ QUESTIONS 1-9: 11
SUM OF ALL RESPONSES TO PHQ QUESTIONS 1-9: 11
10. IF YOU CHECKED OFF ANY PROBLEMS, HOW DIFFICULT HAVE THESE PROBLEMS MADE IT FOR YOU TO DO YOUR WORK, TAKE CARE OF THINGS AT HOME, OR GET ALONG WITH OTHER PEOPLE: VERY DIFFICULT

## 2019-06-15 ENCOUNTER — VIRTUAL VISIT (OUTPATIENT)
Dept: FAMILY MEDICINE | Facility: OTHER | Age: 39
End: 2019-06-15

## 2019-06-15 ASSESSMENT — ANXIETY QUESTIONNAIRES: GAD7 TOTAL SCORE: 11

## 2019-06-15 ASSESSMENT — PATIENT HEALTH QUESTIONNAIRE - PHQ9: SUM OF ALL RESPONSES TO PHQ QUESTIONS 1-9: 11

## 2019-06-15 NOTE — PROGRESS NOTES
"Date: [id number]  Clinician: Cira Hernandez  Clinician NPI: [id number]  Patient: Tati Hamilton  Patient : 1980  Patient Address: [facility] [place] [address]  Patient Phone: [phone]  Visit Protocol: URI  Patient Summary:  Tati is a 38 year old ( : 1980 ) female who initiated a Visit for cold, sinus infection, or influenza. When asked the question \"Please sign me up to receive news, health information and promotions from [employer].\", Tati responded \"No\".    Tati states her symptoms started suddenly 3-6 days ago.   Her symptoms consist of a headache, a sore throat, malaise, myalgia, enlarged lymph nodes, a cough, and chills. She is experiencing mild difficulty breathing with activities but can speak normally in full sentences.   Symptom details     Cough: Tati coughs almost every minute and her cough is more bothersome at night. Phlegm does not come into her throat when she coughs.     Sore throat: Tati reports having moderate throat pain (4-6 on a 10 point pain scale), does not have exudate on her tonsils, and can swallow liquids. The lymph nodes in her neck are enlarged. A rash has not appeared on the skin since the sore throat started.     Headache: She states the headache is moderate (4-6 on a 10 point pain scale).      Tati denies having fever, rhinitis, wheezing, teeth pain, nasal congestion, ear pain, and facial pain or pressure. She also denies having recent facial or sinus surgery in the past 60 days, taking antibiotic medication for the symptoms, and double sickening (worsening symptoms after initial improvement).   Tati is not sure if she has been exposed to someone with strep throat. She has not recently been exposed to someone with influenza. Tati has not been in close contact with any high risk individuals.   Weight: 152 lbs   Tati smokes or uses smokeless tobacco.   She denies pregnancy and denies breastfeeding. She has " menstruated in the past month.   Additional information as reported by the patient (free text): It hurts to breath in.     MEDICATIONS: codeine-guaifenesin oral, lorazepam oral, ranitidine oral, atenolol oral, lisinopril oral, ALLERGIES: NKDA  Clinician Response:  Dear Tati,  I am sorry you are not feeling well. To determine the most appropriate care for you, I would like you to be seen in person to further discuss your health history and symptoms.  You will not be charged for this Visit. Thank you for trusting us with your care.   Diagnosis: Refer for additional evaluation  Diagnosis ICD: R69  Diagnosis ICD: 462.0

## 2019-06-20 ENCOUNTER — TELEPHONE (OUTPATIENT)
Dept: INTERNAL MEDICINE | Facility: CLINIC | Age: 39
End: 2019-06-20

## 2019-06-20 DIAGNOSIS — B97.7 HIGH RISK HPV INFECTION: ICD-10-CM

## 2019-06-20 NOTE — TELEPHONE ENCOUNTER
Pt is past due for f/u pap smear.  [facility] clinic and schedule.    Sandhya Tang  Pap Tracking

## 2019-07-03 ENCOUNTER — TELEPHONE (OUTPATIENT)
Dept: INTERNAL MEDICINE | Facility: CLINIC | Age: 39
End: 2019-07-03

## 2019-07-03 DIAGNOSIS — R73.9 BLOOD SUGAR INCREASED: ICD-10-CM

## 2019-07-03 DIAGNOSIS — I10 HTN, GOAL BELOW 140/90: ICD-10-CM

## 2019-07-03 DIAGNOSIS — E55.9 VITAMIN D DEFICIENCY: ICD-10-CM

## 2019-07-03 DIAGNOSIS — E66.3 OVERWEIGHT: ICD-10-CM

## 2019-07-03 RX ORDER — LISINOPRIL 20 MG/1
20 TABLET ORAL DAILY
Qty: 90 TABLET | Refills: 0 | Status: SHIPPED | OUTPATIENT
Start: 2019-07-03 | End: 2019-10-23

## 2019-07-03 RX ORDER — ATENOLOL 25 MG/1
25 TABLET ORAL DAILY
Qty: 90 TABLET | Refills: 0 | Status: SHIPPED | OUTPATIENT
Start: 2019-07-03 | End: 2019-10-23

## 2019-07-03 NOTE — TELEPHONE ENCOUNTER
Medication is being filled for 1 time refill only due to:  Patient needs labs drawn.  Patient aware she is due for labs, plans to have these drawn next week. Future orders are placed.     Patient is asking to have B12 lab added to orders as well. Order pended and routed to covering provider to review.

## 2019-07-03 NOTE — TELEPHONE ENCOUNTER
"Requested Prescriptions   Pending Prescriptions Disp Refills     lisinopril (PRINIVIL/ZESTRIL) 20 MG tablet  Last Written Prescription Date:  3/26/19  Last Fill Quantity: 90,  # refills: 0   Last office visit: 3/11/2019 with prescribing provider:  Jose   Future Office Visit:     90 tablet 0     Sig: Take 1 tablet (20 mg) by mouth daily       ACE Inhibitors (Including Combos) Protocol Failed - 7/3/2019  9:01 AM        Failed - Blood pressure under 140/90 in past 12 months     BP Readings from Last 3 Encounters:   03/11/19 140/90   11/02/18 122/76   10/18/18 100/66                 Failed - Normal serum creatinine on file in past 12 months     Recent Labs   Lab Test 12/01/17  0958   CR 0.82             Failed - Normal serum potassium on file in past 12 months     Recent Labs   Lab Test 12/01/17  0958   POTASSIUM 4.2             Passed - Recent (12 mo) or future (30 days) visit within the authorizing provider's specialty     Patient had office visit in the last 12 months or has a visit in the next 30 days with authorizing provider or within the authorizing provider's specialty.  See \"Patient Info\" tab in inbasket, or \"Choose Columns\" in Meds & Orders section of the refill encounter.              Passed - Medication is active on med list        Passed - Patient is age 18 or older        Passed - No active pregnancy on record        Passed - No positive pregnancy test within past 12 months        atenolol (TENORMIN) 25 MG tablet  Last Written Prescription Date:  2/18/19  Last Fill Quantity: 90,  # refills: 0   Last office visit: 3/11/2019 with prescribing provider:  Jose   Future Office Visit:     90 tablet 0     Sig: Take 1 tablet (25 mg) by mouth daily       Beta-Blockers Protocol Failed - 7/3/2019  9:01 AM        Failed - Blood pressure under 140/90 in past 12 months     BP Readings from Last 3 Encounters:   03/11/19 140/90   11/02/18 122/76   10/18/18 100/66                 Passed - Patient is age 6 or older    " "    Passed - Recent (12 mo) or future (30 days) visit within the authorizing provider's specialty     Patient had office visit in the last 12 months or has a visit in the next 30 days with authorizing provider or within the authorizing provider's specialty.  See \"Patient Info\" tab in inbasket, or \"Choose Columns\" in Meds & Orders section of the refill encounter.              Passed - Medication is active on med list          "

## 2019-08-01 ENCOUNTER — OFFICE VISIT (OUTPATIENT)
Dept: URGENT CARE | Facility: URGENT CARE | Age: 39
End: 2019-08-01
Payer: COMMERCIAL

## 2019-08-01 VITALS
OXYGEN SATURATION: 97 % | DIASTOLIC BLOOD PRESSURE: 82 MMHG | HEART RATE: 96 BPM | SYSTOLIC BLOOD PRESSURE: 126 MMHG | BODY MASS INDEX: 23.34 KG/M2 | TEMPERATURE: 98.6 F | WEIGHT: 149 LBS

## 2019-08-01 DIAGNOSIS — T14.8XXA ABRASION: Primary | ICD-10-CM

## 2019-08-01 PROCEDURE — 99213 OFFICE O/P EST LOW 20 MIN: CPT | Performed by: FAMILY MEDICINE

## 2019-08-02 ENCOUNTER — ALLIED HEALTH/NURSE VISIT (OUTPATIENT)
Dept: NURSING | Facility: CLINIC | Age: 39
End: 2019-08-02
Payer: COMMERCIAL

## 2019-08-02 DIAGNOSIS — L08.9 TOE INFECTION: ICD-10-CM

## 2019-08-02 DIAGNOSIS — Z23 NEED FOR VACCINATION: Primary | ICD-10-CM

## 2019-08-02 DIAGNOSIS — S99.922D INJURY OF TOE ON LEFT FOOT, SUBSEQUENT ENCOUNTER: Primary | ICD-10-CM

## 2019-08-02 PROCEDURE — 90714 TD VACC NO PRESV 7 YRS+ IM: CPT

## 2019-08-02 PROCEDURE — 90471 IMMUNIZATION ADMIN: CPT

## 2019-08-02 NOTE — NURSING NOTE
Screening Questionnaire for Adult Immunization    Are you sick today?   No   Do you have allergies to medications, food, a vaccine component or latex?   No   Have you ever had a serious reaction after receiving a vaccination?   No   Do you have a long-term health problem with heart disease, lung disease, asthma, kidney disease, metabolic disease (e.g. diabetes), anemia, or other blood disorder?   No   Do you have cancer, leukemia, HIV/AIDS, or any other immune system problem?   No   In the past 3 months, have you taken medications that affect  your immune system, such as prednisone, other steroids, or anticancer drugs; drugs for the treatment of rheumatoid arthritis, Crohn s disease, or psoriasis; or have you had radiation treatments?   No   Have you had a seizure, or a brain or other nervous system problem?   No   During the past year, have you received a transfusion of blood or blood     products, or been given immune (gamma) globulin or antiviral drug?   No   For women: Are you pregnant or is there a chance you could become        pregnant during the next month?   No   Have you received any vaccinations in the past 4 weeks?   No     Immunization questionnaire answers were all negative.        Per orders of Dr. Garcia, injection of TD Vaccine given by Becca Summers. Patient instructed to remain in clinic for 15 minutes afterwards, and to report any adverse reaction to me immediately.       Screening performed by Becca Summers on 8/2/2019 at 1:16 PM.
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
## 2019-10-03 ENCOUNTER — HEALTH MAINTENANCE LETTER (OUTPATIENT)
Age: 39
End: 2019-10-03

## 2019-10-23 DIAGNOSIS — E66.3 OVERWEIGHT: ICD-10-CM

## 2019-10-23 DIAGNOSIS — E55.9 VITAMIN D DEFICIENCY: ICD-10-CM

## 2019-10-23 DIAGNOSIS — R73.9 BLOOD SUGAR INCREASED: ICD-10-CM

## 2019-10-23 DIAGNOSIS — I10 HTN, GOAL BELOW 140/90: ICD-10-CM

## 2019-10-23 RX ORDER — ATENOLOL 25 MG/1
25 TABLET ORAL DAILY
Qty: 90 TABLET | Refills: 0 | Status: SHIPPED | OUTPATIENT
Start: 2019-10-23 | End: 2020-01-23

## 2019-10-23 RX ORDER — LISINOPRIL 20 MG/1
20 TABLET ORAL DAILY
Qty: 90 TABLET | Refills: 0 | Status: SHIPPED | OUTPATIENT
Start: 2019-10-23 | End: 2020-01-23

## 2019-10-23 NOTE — TELEPHONE ENCOUNTER
"Requested Prescriptions   Pending Prescriptions Disp Refills     lisinopril (PRINIVIL/ZESTRIL) 20 MG tablet  Last Written Prescription Date:  7/3/19  Last Fill Quantity: 90,  # refills: 0   Last office visit: 3/11/2019 with prescribing provider:  Jose   Future Office Visit:     90 tablet 0     Sig: Take 1 tablet (20 mg) by mouth daily       ACE Inhibitors (Including Combos) Protocol Failed - 10/23/2019  2:21 PM        Failed - Normal serum creatinine on file in past 12 months     Recent Labs   Lab Test 12/01/17  0958   CR 0.82             Failed - Normal serum potassium on file in past 12 months     Recent Labs   Lab Test 12/01/17  0958   POTASSIUM 4.2             Passed - Blood pressure under 140/90 in past 12 months     BP Readings from Last 3 Encounters:   08/01/19 126/82   03/11/19 140/90   11/02/18 122/76                 Passed - Recent (12 mo) or future (30 days) visit within the authorizing provider's specialty     Patient has had an office visit with the authorizing provider or a provider within the authorizing providers department within the previous 12 mos or has a future within next 30 days. See \"Patient Info\" tab in inbasket, or \"Choose Columns\" in Meds & Orders section of the refill encounter.              Passed - Medication is active on med list        Passed - Patient is age 18 or older        Passed - No active pregnancy on record        Passed - No positive pregnancy test within past 12 months        atenolol (TENORMIN) 25 MG tablet  Last Written Prescription Date:  7/3/19  Last Fill Quantity: 90,  # refills: 0   Last office visit: 3/11/2019 with prescribing provider:  Jose   Future Office Visit:     90 tablet 0     Sig: Take 1 tablet (25 mg) by mouth daily       Beta-Blockers Protocol Passed - 10/23/2019  2:21 PM        Passed - Blood pressure under 140/90 in past 12 months     BP Readings from Last 3 Encounters:   08/01/19 126/82   03/11/19 140/90   11/02/18 122/76                 Passed - " "Patient is age 6 or older        Passed - Recent (12 mo) or future (30 days) visit within the authorizing provider's specialty     Patient has had an office visit with the authorizing provider or a provider within the authorizing providers department within the previous 12 mos or has a future within next 30 days. See \"Patient Info\" tab in inbasket, or \"Choose Columns\" in Meds & Orders section of the refill encounter.              Passed - Medication is active on med list          "

## 2019-10-23 NOTE — TELEPHONE ENCOUNTER
Routing refill request to provider for review/approval because:  Labs not current:  Creatinine, potassium

## 2019-12-10 DIAGNOSIS — J06.9 UPPER RESPIRATORY TRACT INFECTION, UNSPECIFIED TYPE: ICD-10-CM

## 2019-12-12 RX ORDER — CODEINE PHOSPHATE AND GUAIFENESIN 10; 100 MG/5ML; MG/5ML
1-2 SOLUTION ORAL EVERY 4 HOURS PRN
Qty: 240 ML | Refills: 0 | Status: SHIPPED | OUTPATIENT
Start: 2019-12-12 | End: 2020-01-28

## 2019-12-12 NOTE — TELEPHONE ENCOUNTER
Controlled Substance Refill Request for guaiFENesin-codeine (ROBITUSSIN AC) 100-10 MG/5ML solution  Problem List Complete:  No     PROVIDER TO CONSIDER COMPLETION OF PROBLEM LIST AND OVERVIEW/CONTROLLED SUBSTANCE AGREEMENT    Last Written Prescription Date:  6/13/19  Last Fill Quantity: 240 ml,   # refills: 1    THE MOST RECENT OFFICE VISIT MUST BE WITHIN THE PAST 3 MONTHS. AT LEAST ONE FACE TO FACE VISIT MUST OCCUR EVERY 6 MONTHS. ADDITIONAL VISITS CAN BE VIRTUAL.    Last Office Visit with [facility] primary care provider: 3/11/19    Future Office visit:     Controlled substance agreement:   Encounter-Level CSA:    There are no encounter-level csa.     Patient-Level CSA:    There are no patient-level csa.         Last Urine Drug Screen: No results found for: CDAUT, No results found for: COMDAT, No results found for: THC13, PCP13, COC13, MAMP13, OPI13, AMP13, BZO13, TCA13, MTD13, BAR13, OXY13, PPX13, BUP13     Processing:  Rx to be electronically transmitted to pharmacy by provider      [email or web address]      Writer having problems with the [facility] website.  Please check [facility]

## 2020-01-23 DIAGNOSIS — I10 HTN, GOAL BELOW 140/90: ICD-10-CM

## 2020-01-23 DIAGNOSIS — R73.9 BLOOD SUGAR INCREASED: ICD-10-CM

## 2020-01-23 DIAGNOSIS — E66.3 OVERWEIGHT: ICD-10-CM

## 2020-01-23 DIAGNOSIS — E55.9 VITAMIN D DEFICIENCY: ICD-10-CM

## 2020-01-23 RX ORDER — ATENOLOL 25 MG/1
25 TABLET ORAL DAILY
Qty: 90 TABLET | Refills: 0 | Status: SHIPPED | OUTPATIENT
Start: 2020-01-23 | End: 2020-04-30

## 2020-01-23 RX ORDER — LISINOPRIL 20 MG/1
20 TABLET ORAL DAILY
Qty: 90 TABLET | Refills: 0 | Status: SHIPPED | OUTPATIENT
Start: 2020-01-23 | End: 2020-04-30

## 2020-01-23 NOTE — TELEPHONE ENCOUNTER
"Requested Prescriptions   Pending Prescriptions Disp Refills     atenolol (TENORMIN) 25 MG tablet  Last Written Prescription Date:  10/23/19  Last Fill Quantity: 90,  # refills: 0   Last office visit: 3/11/2019 with prescribing provider:  Jose   Future Office Visit:     90 tablet 0     Sig: Take 1 tablet (25 mg) by mouth daily       Beta-Blockers Protocol Passed - 1/23/2020 12:22 PM        Passed - Blood pressure under 140/90 in past 12 months     BP Readings from Last 3 Encounters:   08/01/19 126/82   03/11/19 140/90   11/02/18 122/76                 Passed - Patient is age 6 or older        Passed - Recent (12 mo) or future (30 days) visit within the authorizing provider's specialty     Patient has had an office visit with the authorizing provider or a provider within the authorizing providers department within the previous 12 mos or has a future within next 30 days. See \"Patient Info\" tab in inbasket, or \"Choose Columns\" in Meds & Orders section of the refill encounter.              Passed - Medication is active on med list        lisinopril (PRINIVIL/ZESTRIL) 20 MG tablet  Last Written Prescription Date:  10/23/19  Last Fill Quantity: 90,  # refills: 0   Last office visit: 3/11/2019 with prescribing provider:  Jose Pérez Office Visit:     90 tablet 0     Sig: Take 1 tablet (20 mg) by mouth daily       ACE Inhibitors (Including Combos) Protocol Failed - 1/23/2020 12:22 PM        Failed - Normal serum creatinine on file in past 12 months     Recent Labs   Lab Test 12/01/17  0958   CR 0.82             Failed - Normal serum potassium on file in past 12 months     Recent Labs   Lab Test 12/01/17  0958   POTASSIUM 4.2             Passed - Blood pressure under 140/90 in past 12 months     BP Readings from Last 3 Encounters:   08/01/19 126/82   03/11/19 140/90   11/02/18 122/76                 Passed - Recent (12 mo) or future (30 days) visit within the authorizing provider's specialty     Patient has had an " "office visit with the authorizing provider or a provider within the authorizing providers department within the previous 12 mos or has a future within next 30 days. See \"Patient Info\" tab in inbasket, or \"Choose Columns\" in Meds & Orders section of the refill encounter.              Passed - Medication is active on med list        Passed - Patient is age 18 or older        Passed - No active pregnancy on record        Passed - No positive pregnancy test within past 12 months          "

## 2020-01-28 ENCOUNTER — OFFICE VISIT (OUTPATIENT)
Dept: INTERNAL MEDICINE | Facility: CLINIC | Age: 40
End: 2020-01-28
Payer: COMMERCIAL

## 2020-01-28 VITALS
RESPIRATION RATE: 11 BRPM | HEART RATE: 95 BPM | DIASTOLIC BLOOD PRESSURE: 80 MMHG | HEIGHT: 67 IN | BODY MASS INDEX: 24.8 KG/M2 | OXYGEN SATURATION: 99 % | WEIGHT: 158 LBS | SYSTOLIC BLOOD PRESSURE: 130 MMHG | TEMPERATURE: 97.9 F

## 2020-01-28 DIAGNOSIS — M53.3 PAIN IN THE COCCYX: Primary | ICD-10-CM
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
## 2020-01-28 PROCEDURE — 99213 OFFICE O/P EST LOW 20 MIN: CPT | Performed by: INTERNAL MEDICINE

## 2020-01-28 ASSESSMENT — MIFFLIN-ST. JEOR: SCORE: 1424.31

## 2020-01-28 NOTE — PROGRESS NOTES
"  Patient's instructions / PLAN:                                                        Plan:  1.  Letter to return to work    ASSESSMENT & PLAN:                                                      (M53.3) Pain in the coccyx  (primary encounter diagnosis)  Comment: Still present, but getting better  Plan:     (W19.XXXA) Fall, initial encounter  Comment:   Plan:        Chief Complaint:                                                      Fall    SUBJECTIVE:                                                    History of present illness     Fall  -- 1 week ago she fell in the shower   -- her tailbone hurts but it is improving  -- pain spasm that take her breath away if she sits wrong/moves wrong  -- ice, ibuprofen help  -- she needs a doctor's note to work. She needs frequent breaks to stand because she cannot stand very long.  -- pillow , low back with no muscle tension or tenderness.     ROS:                                                      ROS: negative for fever, chills, cough, wheezes, chest pain, shortness of breath, vomiting, abdominal pain, leg swelling, positive for lower back pain    This document serves as a record of the services and decisions personally performed and made by Dr. Jose MD. It was created on their behalf by Aniceto Pierce, a trained medical scribe. The creation of this document is based on the provider's statements to the medical scribe.  Aniceto Pierce January 28, 2020 11:15 AM    OBJECTIVE:                                                    Physical Exam :    Blood pressure 130/80, pulse 95, temperature 97.9  F (36.6  C), temperature source Oral, resp. rate 11, height 1.702 m (5' 7\"), weight 71.7 kg (158 lb), SpO2 99 %, not currently breastfeeding.   NAD, appears comfortable  Skin: no rashes   Neurologic: A, Ox3, no focal signs appreciated  Musculoskeletal: the paraspinal muscles in the lumbar arenot  tender and tense on palpation      PMHx: reviewed  Past Medical History:   Diagnosis " Date     Anxiety      Calculus of kidney      Depressive disorder      Depressive disorder, not elsewhere classified      ESTEPHANIA (generalised anxiety disorder)      High risk HPV infection 3/20/15,10/17/16    Not HPV 16 or 18, HR, Not 16 or 18     HTN, goal below 140/90      Lung nodules May 2010    Needs f/u CT Nov 2010     Multiple sclerosis (H) Dx in 2001     PONV (postoperative nausea and vomiting)      Vitamin D deficiencies       PSHx: reviewed  Past Surgical History:   Procedure Laterality Date     C NONSPECIFIC PROCEDURE      2 procedures for kidney stones     HC COLP CERVIX/UPPER VAGINA W LOOP ELEC BX CERVIX       HC REMOVAL OF TONSILS,<13 Y/O      Tonsils <12y.o.     LAPAROSCOPIC CHOLECYSTECTOMY  5/8/2014    Procedure: LAPAROSCOPIC CHOLECYSTECTOMY;  Surgeon: Sona Giraldo MD;  Location: [facility] OR        Meds: reviewed      Soc Hx: reviewed  Fam Hx: reviewed          Gely Garcia MD  Internal Medicine    The information in this document, created by the medical scribe for me, accurately reflects the services I personally performed and the decisions made by me. I have reviewed and approved this document for accuracy prior to leaving the patient care area.  January 28, 2020 1:19 PM

## 2020-01-28 NOTE — LETTER
[facility]  [address], [address]  [place] [address]  [phone]        January 28, 2020    Tati Hamilton  [address] [address]          To Whom It May Concern:     I evaluated  Tati Hamilton today for low back pain after a fall at home.   She may return to work. She most likely will need to change position frequently from sitting to standing and she might need to incorporate short walks during her work hours. No other restrictions.    Sincerely,    Gely Garcia MD  Internal Medicine

## 2020-02-06 DIAGNOSIS — F41.1 GENERALIZED ANXIETY DISORDER: Primary | ICD-10-CM

## 2020-02-06 RX ORDER — LORAZEPAM 0.5 MG/1
TABLET ORAL
Qty: 30 TABLET | Refills: 0 | Status: SHIPPED | OUTPATIENT
Start: 2020-02-06 | End: 2020-03-21

## 2020-02-06 NOTE — TELEPHONE ENCOUNTER
LORazepam      Last Written Prescription Date:  9/15/17  Last Fill Quantity: 30,   # refills: 0  Last Office Visit: 1/28/20  Future Office visit:       Routing refill request to provider for review/approval because:  Drug not on the G, P or [facility] refill protocol or controlled substance

## 2020-03-21 ENCOUNTER — MYC REFILL (OUTPATIENT)
Dept: INTERNAL MEDICINE | Facility: CLINIC | Age: 40
End: 2020-03-21

## 2020-03-21 ENCOUNTER — VIRTUAL VISIT (OUTPATIENT)
Dept: FAMILY MEDICINE | Facility: OTHER | Age: 40
End: 2020-03-21

## 2020-03-21 DIAGNOSIS — F41.1 GENERALIZED ANXIETY DISORDER: ICD-10-CM

## 2020-03-21 NOTE — PROGRESS NOTES
"Date: 2020 11:29:34  Clinician: Kori Griffin  Clinician NPI: [id number]  Patient: Tati Hamilton  Patient : 1980  [address] [id number]  Patient Phone: [phone]  Visit Protocol: URI  Patient Summary:  Tati is a 39 year old ( : 1980 ) female who initiated a Visit for cold, sinus infection, or influenza. When asked the question \"Please sign me up to receive news, health information and promotions from [employer].\", Tati responded \"No\".    Tati states her symptoms started gradually 3-6 days ago.   Her symptoms consist of rhinitis, wheezing, a cough, nasal congestion, malaise, tooth pain, and a headache. She is experiencing mild difficulty breathing with activities but can speak normally in full sentences.   Symptom details     Nasal secretions: The color of her mucus is clear.    Cough: Tati coughs every 5-10 minutes and her cough is more bothersome at night. Phlegm does not come into her throat when she coughs. She does not believe her cough is caused by post-nasal drip.     Wheezing: Tati has not ever been diagnosed with asthma. The wheezing does not interfere with her normal daily activities.    Headache: She states the headache is moderate (4-6 on a 10 point pain scale).     Tooth pain: The tooth pain is caused by a cavity, recent dental work, or other mouth problems.      Tati denies having ear pain, facial pain or pressure, myalgias, sore throat, chills, and fever. She also denies double sickening (worsening symptoms after initial improvement), taking antibiotic medication for the symptoms, and having recent facial or sinus surgery in the past 60 days.   Precipitating events  She has not recently been exposed to someone with influenza. Tati has not been in close contact with any high risk individuals.   Pertinent COVID-19 (Coronavirus) information  Tati has not traveled internationally or to the areas where " COVID-19 (Coronavirus) is widespread, including cruise ship travel in the last 14 days before the start of her symptoms.   Tati has not had a close contact with a laboratory-confirmed COVID-19 patient within 14 days of symptom onset. She also has not had a close contact with a suspected COVID-19 patient within 14 days of symptom onset.   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   Pertinent medical history  Tati does not get yeast infections when she takes antibiotics.   Tati needs a return to work/school note.   Weight: 160 lbs   Tati smokes or uses smokeless tobacco.   She denies pregnancy and denies breastfeeding. She has menstruated in the past month.   Weight: 160 lbs    MEDICATIONS: omeprazole oral, lorazepam oral, atenolol oral, lisinopril oral, ALLERGIES: NKDA  Clinician Response:  Dear Tati,   Based on the information you have provided, you do have symptoms that are consistent with Coronavirus (COVID-19).   The coronavirus causes mild to severe respiratory illness with the most common symptoms including fever, cough and difficulty breathing. Unfortunately, many viruses cause similar symptoms and it can be difficult to distinguish between viruses, especially in mild cases, so we are presuming that anyone with cough or fever has coronavirus at this time.  Coronavirus/COVID-19 has reached the point of community spread in [place], meaning that we are finding the virus in people with no known exposure risk for pattie the virus. Given the increasing commonness of coronavirus in the community we are no longer testing patients who are not critically ill.  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  For everyone else who has cough or fever, you should assume you are infected with coronavirus. Accordingly, you should self-quarantine for seven days  from the first day your symptoms started OR 72 hours after your cough and fever completely resolve - WHICHEVER is LONGER. You should call if you find increasing shortness of breath, wheezing or sustained fever above 101.5. If you are significantly short of breath or experience chest pain you should call 911 or report to the nearest emergency department for urgent evaluation.    Isolate yourself at home.   Do Not allow any visitors  Do Not go to work or school  Do Not go to [employer],  centers, shopping, or other public places.  Do Not shake hands.  Avoid close contact with others (hugging, kissing).   Protect Others:    Cover Your Mouth and Nose with a mask, disposable tissue or wash cloth to avoid spreading germs to others.  Wash your hands and face frequently with soap and water.   Managing Symptoms:    At this time, we primarily recommend Tylenol (Acetaminophen) for fever or pain. If you have liver or kidney problems, contact your primary care provider for instructions on use of tylenol. Adults can take 650 mg (two 325 mg pills) by mouth every 4-6 hours as needed OR 1,000 mg (two 500 mg pills) every 8 hours as needed. MAXIMUM DAILY DOSE: 3,000mg. For children, refer to dosing on bottle based on age or weight.   If you develop significant shortness of breath that prevents you from doing normal activities, please call 911 or proceed to the nearest emergency room and alert them immediately that you have been in self-isolation for possible coronavirus.   For more information about COVID19 and options for caring for yourself at home, please visit the CDC website at https://www.cdc.gov/coronavirus/2019-ncov/about/steps-when-sick.htmlFor more options for care at [facility], please visit our website at [email or web address]     Diagnosis: Cough  Diagnosis ICD: R05

## 2020-03-23 NOTE — TELEPHONE ENCOUNTER
Controlled Substance Refill Request for Lorazepam  Problem List Complete:  No     PROVIDER TO CONSIDER COMPLETION OF PROBLEM LIST AND OVERVIEW/CONTROLLED SUBSTANCE AGREEMENT    Last Written Prescription Date:  2/6/20  Last Fill Quantity: 30,   # refills: 0    THE MOST RECENT OFFICE VISIT MUST BE WITHIN THE PAST 3 MONTHS. AT LEAST ONE FACE TO FACE VISIT MUST OCCUR EVERY 6 MONTHS. ADDITIONAL VISITS CAN BE VIRTUAL.  (THIS STATEMENT SHOULD BE DELETED.)    [place] [facility] [place]: 1/28/20    Future Office visit:     Controlled substance agreement:   Encounter-Level CSA:    There are no encounter-level csa.     Patient-Level CSA:    There are no patient-level csa.         Last Urine Drug Screen: No results found for: CDAUT, No results found for: COMDAT, No results found for: THC13, PCP13, COC13, MAMP13, OPI13, AMP13, BZO13, TCA13, MTD13, BAR13, OXY13, PPX13, BUP13     Processing:  Rx to be electronically transmitted to pharmacy by provider      [email or web address]       checked in past 3 months?  Yes 3/23/20  No concerns.    Please advise, thanks.

## 2020-03-24 DIAGNOSIS — G47.33 OBSTRUCTIVE SLEEP APNEA (ADULT) (PEDIATRIC): Primary | ICD-10-CM

## 2020-03-24 RX ORDER — LORAZEPAM 0.5 MG/1
TABLET ORAL
Qty: 30 TABLET | Refills: 0 | Status: SHIPPED | OUTPATIENT
Start: 2020-03-24 | End: 2020-10-02

## 2020-03-26 ENCOUNTER — OFFICE VISIT (OUTPATIENT)
Dept: URGENT CARE | Facility: URGENT CARE | Age: 40
End: 2020-03-26
Payer: COMMERCIAL

## 2020-03-26 ENCOUNTER — RESULTS ONLY (OUTPATIENT)
Dept: LAB | Age: 40
End: 2020-03-26

## 2020-03-26 DIAGNOSIS — Z20.822 SUSPECTED COVID-19 VIRUS INFECTION: Primary | ICD-10-CM

## 2020-03-26 PROCEDURE — 99207 ZZC NO BILLABLE SERVICE THIS VISIT: CPT | Performed by: NURSE PRACTITIONER

## 2020-03-26 NOTE — PATIENT INSTRUCTIONS
[facility] Employee Health team will receive your Covid 19 test results in the next 1-4 days.  Once your results are received, Employee Health will call you with your test result and discuss your Return to Work timeline and criteria.

## 2020-03-28 LAB
COVID-19 VIRUS PCR TO MAYO - RESULT: NORMAL
SARS-COV-2 RNA SPEC QL NAA+PROBE: ABNORMAL
SPECIMEN SOURCE: ABNORMAL
SPECIMEN SOURCE: NORMAL

## 2020-04-30 DIAGNOSIS — R73.9 BLOOD SUGAR INCREASED: ICD-10-CM

## 2020-04-30 DIAGNOSIS — E66.3 OVERWEIGHT: ICD-10-CM

## 2020-04-30 DIAGNOSIS — I10 HTN, GOAL BELOW 140/90: ICD-10-CM

## 2020-04-30 DIAGNOSIS — E55.9 VITAMIN D DEFICIENCY: ICD-10-CM

## 2020-04-30 RX ORDER — LISINOPRIL 20 MG/1
20 TABLET ORAL DAILY
Qty: 90 TABLET | Refills: 1 | Status: SHIPPED | OUTPATIENT
Start: 2020-04-30 | End: 2020-05-28

## 2020-04-30 RX ORDER — ATENOLOL 25 MG/1
25 TABLET ORAL DAILY
Qty: 90 TABLET | Refills: 1 | Status: SHIPPED | OUTPATIENT
Start: 2020-04-30 | End: 2020-11-06

## 2020-04-30 NOTE — TELEPHONE ENCOUNTER
"Requested Prescriptions   Pending Prescriptions Disp Refills     atenolol (TENORMIN) 25 MG tablet 90 tablet 0     Sig: Take 1 tablet (25 mg) by mouth daily  Last Written Prescription Date:  1/23/20  Last Fill Quantity: 90,  # refills: 0   Last office visit: 1/28/2020 with prescribing provider:  Jose Préez Office Visit:           Beta-Blockers Protocol Passed - 4/30/2020  9:12 AM        Passed - Blood pressure under 140/90 in past 12 months     BP Readings from Last 3 Encounters:   01/28/20 130/80   08/01/19 126/82   03/11/19 140/90                 Passed - Patient is age 6 or older        Passed - Recent (12 mo) or future (30 days) visit within the authorizing provider's specialty     Patient has had an office visit with the authorizing provider or a provider within the authorizing providers department within the previous 12 mos or has a future within next 30 days. See \"Patient Info\" tab in inbasket, or \"Choose Columns\" in Meds & Orders section of the refill encounter.              Passed - Medication is active on med list           lisinopril (ZESTRIL) 20 MG tablet  Last Written Prescription Date:  1/23/20  Last Fill Quantity: 90,  # refills: 0   Last office visit: 1/28/2020 with prescribing provider:  Duane Pérez Office Visit:     90 tablet 0     Sig: Take 1 tablet (20 mg) by mouth daily       ACE Inhibitors (Including Combos) Protocol Failed - 4/30/2020  9:12 AM        Failed - Normal serum creatinine on file in past 12 months     Recent Labs   Lab Test 12/01/17  0958   CR 0.82       Ok to refill medication if creatinine is low          Failed - Normal serum potassium on file in past 12 months     Recent Labs   Lab Test 12/01/17  0958   POTASSIUM 4.2             Passed - Blood pressure under 140/90 in past 12 months     BP Readings from Last 3 Encounters:   01/28/20 130/80   08/01/19 126/82   03/11/19 140/90                 Passed - Recent (12 mo) or future (30 days) visit within the authorizing " "provider's specialty     Patient has had an office visit with the authorizing provider or a provider within the authorizing providers department within the previous 12 mos or has a future within next 30 days. See \"Patient Info\" tab in inbasket, or \"Choose Columns\" in Meds & Orders section of the refill encounter.              Passed - Medication is active on med list        Passed - Patient is age 18 or older        Passed - No active pregnancy on record        Passed - No positive pregnancy test within past 12 months             "

## 2020-05-28 ENCOUNTER — VIRTUAL VISIT (OUTPATIENT)
Dept: INTERNAL MEDICINE | Facility: CLINIC | Age: 40
End: 2020-05-28
Payer: COMMERCIAL

## 2020-05-28 VITALS — SYSTOLIC BLOOD PRESSURE: 124 MMHG | DIASTOLIC BLOOD PRESSURE: 82 MMHG

## 2020-05-28 DIAGNOSIS — E66.3 OVERWEIGHT: ICD-10-CM

## 2020-05-28 DIAGNOSIS — E55.9 VITAMIN D DEFICIENCY: ICD-10-CM

## 2020-05-28 DIAGNOSIS — I10 HTN, GOAL BELOW 140/90: Primary | ICD-10-CM

## 2020-05-28 DIAGNOSIS — R73.9 BLOOD SUGAR INCREASED: ICD-10-CM

## 2020-05-28 DIAGNOSIS — G47.9 SLEEP DISORDER: ICD-10-CM

## 2020-05-28 PROCEDURE — 99214 OFFICE O/P EST MOD 30 MIN: CPT | Mod: GT | Performed by: INTERNAL MEDICINE

## 2020-05-28 RX ORDER — ERGOCALCIFEROL 1.25 MG/1
50000 CAPSULE, LIQUID FILLED ORAL WEEKLY
Qty: 12 CAPSULE | Refills: 1 | Status: SHIPPED | OUTPATIENT
Start: 2020-05-28 | End: 2021-01-20

## 2020-05-28 RX ORDER — HYDROXYZINE PAMOATE 25 MG/1
25 CAPSULE ORAL 3 TIMES DAILY PRN
Qty: 90 CAPSULE | Refills: 1 | Status: SHIPPED | OUTPATIENT
Start: 2020-05-28 | End: 2021-10-08

## 2020-05-28 RX ORDER — LISINOPRIL 20 MG/1
20 TABLET ORAL 2 TIMES DAILY
Qty: 180 TABLET | Refills: 0 | Status: SHIPPED | OUTPATIENT
Start: 2020-05-28 | End: 2020-11-06

## 2020-07-03 ENCOUNTER — OFFICE VISIT (OUTPATIENT)
Dept: OBGYN | Facility: CLINIC | Age: 40
End: 2020-07-03
Payer: COMMERCIAL

## 2020-07-03 ENCOUNTER — PATIENT OUTREACH (OUTPATIENT)
Dept: OBGYN | Facility: CLINIC | Age: 40
End: 2020-07-03

## 2020-07-03 VITALS — HEIGHT: 68 IN | WEIGHT: 153.8 LBS | BODY MASS INDEX: 23.31 KG/M2

## 2020-07-03 DIAGNOSIS — B97.7 HIGH RISK HPV INFECTION: ICD-10-CM

## 2020-07-03 DIAGNOSIS — Z01.419 ENCOUNTER FOR GYNECOLOGICAL EXAMINATION WITHOUT ABNORMAL FINDING: Primary | ICD-10-CM

## 2020-07-03 PROCEDURE — 87591 N.GONORRHOEAE DNA AMP PROB: CPT | Performed by: OBSTETRICS & GYNECOLOGY

## 2020-07-03 PROCEDURE — 87624 HPV HI-RISK TYP POOLED RSLT: CPT | Performed by: OBSTETRICS & GYNECOLOGY

## 2020-07-03 PROCEDURE — 88175 CYTOPATH C/V AUTO FLUID REDO: CPT | Performed by: OBSTETRICS & GYNECOLOGY

## 2020-07-03 PROCEDURE — G0124 SCREEN C/V THIN LAYER BY MD: HCPCS | Performed by: OBSTETRICS & GYNECOLOGY

## 2020-07-03 PROCEDURE — 99395 PREV VISIT EST AGE 18-39: CPT | Performed by: OBSTETRICS & GYNECOLOGY

## 2020-07-03 PROCEDURE — 87491 CHLMYD TRACH DNA AMP PROBE: CPT | Performed by: OBSTETRICS & GYNECOLOGY

## 2020-07-03 ASSESSMENT — MIFFLIN-ST. JEOR: SCORE: 1413.19

## 2020-07-03 NOTE — PROGRESS NOTES
SUBJECTIVE:                                                      Tati is a 39 year old  female who presents for annual exam. She is due for a Pap/HPV screen    Patient's last menstrual period was 2020 (approximate).. Menses are regular q 28-30 days and normal lasting 4-5 days.  .  She is not currently considering pregnancy.  Besides routine health maintenance, she has no other health concerns today .  GYNECOLOGIC HISTORY:    Tati is sexually active with  male partner(s) History sexually transmitted infections: HPV    History of abnormal Pap smear:   Last 3 Pap and HPV Results:   PAP / HPV Latest Ref Rng & Units 2018 10/17/2016 3/20/2015   PAP - NIL NIL NIL   HPV 16 DNA NEG:Negative Negative Negative Negative   HPV 18 DNA NEG:Negative Negative Negative Negative   OTHER HR HPV NEG:Negative Positive(A) Positive(A) Positive(A)     Family history of breast CA: No  Family history of uterine/ovarian CA: No    Family history of colon CA: No      HISTORY:  OB History    Para Term  AB Living   2 1 1 0 1 1   SAB TAB Ectopic Multiple Live Births   1 0 0 0 1      # Outcome Date GA Lbr Santos/2nd Weight Sex Delivery Anes PTL Lv   2 Term 07 37w0d 12:00 3.005 kg (6 lb 10 oz) M  EPIDURAL  PATRICIA      Birth Comments: induced due to PIH      Name: Josias Mcadams SAB              Past Medical History:   Diagnosis Date     Anxiety      Calculus of kidney      Depressive disorder      Depressive disorder, not elsewhere classified      ESTEPHANIA (generalised anxiety disorder)      High risk HPV infection 3/20/15,10/17/16    Not HPV 16 or 18, HR, Not 16 or 18     HTN, goal below 140/90      Lung nodules May 2010    Needs f/u CT 2010     Multiple sclerosis (H) Dx in      Multiple sclerosis (H)      PONV (postoperative nausea and vomiting)      Vitamin D deficiencies      Past Surgical History:   Procedure Laterality Date     C NONSPECIFIC PROCEDURE      2 procedures for kidney stones     HC COLP  CERVIX/UPPER VAGINA W LOOP ELEC BX CERVIX       HC REMOVAL OF TONSILS,<13 Y/O      Tonsils <12y.o.     LAPAROSCOPIC CHOLECYSTECTOMY  2014    Procedure: LAPAROSCOPIC CHOLECYSTECTOMY;  Surgeon: Sona Giraldo MD;  Location: [facility] OR     Family History   Problem Relation Age of Onset     Gallbladder Disease Mother      Other - See Comments Mother         varicose veins      Hypertension Father      Rectal Cancer Maternal Grandfather      Cerebrovascular Disease Maternal Grandmother      Heart Disease Paternal Grandfather      Social History     Socioeconomic History     Marital status:      Spouse name: None     Number of children: None     Years of education: None     Highest education level: None   Occupational History     None   Social Needs     Financial resource strain: None     Food insecurity     Worry: None     Inability: None     Transportation needs     Medical: None     Non-medical: None   Tobacco Use     Smoking status: Current Every Day Smoker     Packs/day: 0.50     Years: 4.00     Pack years: 2.00     Types: Cigarettes     Last attempt to quit: 2007     Years since quittin.1     Smokeless tobacco: Never Used     Tobacco comment: socially   Substance and Sexual Activity     Alcohol use: Yes     Alcohol/week: 0.0 standard drinks     Comment: occasional     Drug use: No     Sexual activity: Not Currently     Partners: Male     Birth control/protection: None, Other, Pull-out method   Lifestyle     Physical activity     Days per week: None     Minutes per session: None     Stress: None   Relationships     Social connections     Talks on phone: None     Gets together: None     Attends Sikhism service: None     Active member of club or organization: None     Attends meetings of clubs or organizations: None     Relationship status: None     Intimate partner violence     Fear of current or ex partner: None     Emotionally abused: None     Physically abused: None     Forced sexual  "activity: None   Other Topics Concern     Parent/sibling w/ CABG, MI or angioplasty before 65F 55M? Not Asked   Social History Narrative     None       Current Outpatient Medications:      albuterol (PROAIR HFA/PROVENTIL HFA/VENTOLIN HFA) 108 (90 Base) MCG/ACT inhaler, Inhale 2 puffs into the lungs every 6 hours, Disp: 1 Inhaler, Rfl: 0     atenolol (TENORMIN) 25 MG tablet, Take 1 tablet (25 mg) by mouth daily, Disp: 90 tablet, Rfl: 1     hydrOXYzine (VISTARIL) 25 MG capsule, Take 1 capsule (25 mg) by mouth 3 times daily as needed for anxiety, Disp: 90 capsule, Rfl: 1     lisinopril (ZESTRIL) 20 MG tablet, Take 1 tablet (20 mg) by mouth 2 times daily, Disp: 180 tablet, Rfl: 0     LORazepam (ATIVAN) 0.5 MG tablet, 1 po  qd prn anxiety, Disp: 30 tablet, Rfl: 0     order for DME, Equipment being ordered: [employer] DREAM STATION WITH HH AND MIRAGE FX FOR HER NASAL MASK WITH CHINSTRAP.  PRESSURE 7-15 CM., Disp: , Rfl:      vitamin D2 (ERGOCALCIFEROL) 64745 units (1250 mcg) capsule, Take 1 capsule (50,000 Units) by mouth once a week, Disp: 12 capsule, Rfl: 1     Allergies   Allergen Reactions     No Known Drug Allergies        Past medical, surgical, social and family history were reviewed and updated in EPIC.    ROS:   12 point review of systems negative other than symptoms noted below.      OBJECTIVE:                                                      EXAM:  Ht 1.715 m (5' 7.5\")   Wt 69.8 kg (153 lb 12.8 oz)   LMP 06/18/2020 (Approximate)   BMI 23.73 kg/m     BMI: Body mass index is 23.73 kg/m .  General: Alert and oriented, no distress.  Psychiatric: Mood and affect within normal limits.      Vulva:  No external lesions, normal female hair distribution, no inguinal adenopathy.    Urethra:  Midline, non-tender, well supported, no discharge  Vagina:  Well-estrogenized, no abnormal discharge, no lesions  Cervix: no lesions, no discharge, multiparous and Pap and GC/Chlamydia PCR obtianed  Uterus:  deferred  Ovaries: "  No masses appreciated, non-tender, mobile  Rectal Exam: deferred  Musculoskeletal: extremities normal      COUNSELING:   Reviewed preventive health counseling, as reflected in patient instructions   reports that she has been smoking cigarettes. She has a 2.00 pack-year smoking history. She has never used smokeless tobacco.  Tobacco Cessation Action Plan: Information offered: Patient not interested at this time      ASSESSMENT/PLAN:                                                      39 year old female with satisfactory annual exam  (Z01.419) Encounter for gynecological examination without abnormal finding  (primary encounter diagnosis)  Comment: normal exam  Plan: HPV High Risk Types DNA Cervical, Pap imaged         thin layer screen with HPV - recommended age 30        - 65 years (select HPV order below), NEISSERIA         GONORRHOEA PCR, CHLAMYDIA TRACHOMATIS PCR, *MA         Screening Digital Bilateral            RTC 1 year or prn    Considering abalation      Darek Celeste MD

## 2020-07-08 LAB
COPATH REPORT: ABNORMAL
PAP: ABNORMAL

## 2020-07-14 NOTE — TELEPHONE ENCOUNTER
7/3/20 ASCUS, +HR HPV (not 16/18). Plan: colposcopy  7/14/20 left message (lce)  7/16/20 pt is scheduled for a colposcopy on 8/5/20

## 2020-08-05 ENCOUNTER — OFFICE VISIT (OUTPATIENT)
Dept: OBGYN | Facility: CLINIC | Age: 40
End: 2020-08-05
Payer: COMMERCIAL

## 2020-08-05 VITALS — SYSTOLIC BLOOD PRESSURE: 118 MMHG | DIASTOLIC BLOOD PRESSURE: 70 MMHG

## 2020-08-05 DIAGNOSIS — Z01.812 PRE-PROCEDURE LAB EXAM: ICD-10-CM

## 2020-08-05 DIAGNOSIS — R10.9 FLANK PAIN: ICD-10-CM

## 2020-08-05 DIAGNOSIS — R87.610 ATYPICAL SQUAMOUS CELLS OF UNDETERMINED SIGNIFICANCE ON CYTOLOGIC SMEAR OF CERVIX (ASC-US): Primary | ICD-10-CM

## 2020-08-05 LAB — HCG, QUAL URINE: NORMAL

## 2020-08-05 PROCEDURE — 88305 TISSUE EXAM BY PATHOLOGIST: CPT | Performed by: OBSTETRICS & GYNECOLOGY

## 2020-08-05 PROCEDURE — 57456 ENDOCERV CURETTAGE W/SCOPE: CPT | Performed by: OBSTETRICS & GYNECOLOGY

## 2020-08-05 PROCEDURE — 84703 CHORIONIC GONADOTROPIN ASSAY: CPT | Performed by: OBSTETRICS & GYNECOLOGY

## 2020-08-05 NOTE — PROGRESS NOTES
39 year old  presents for colposcopy.      Indication for procedure:ASCUS with +HPV other  Prior history of cervical dysplasia: Yes         Prior Colposcopy history: Yes, last in 2018 with no biopsies taken  Prior LEEP:Yes   No LMP recorded.    Tobacco: Yes, daily smoker  Gardasil vaccination status:No  Pregnancy test: Negative  She denies the possibility of pregnancy     Discussed nature of HPV related infection, natural history and association with cervical dysplasia.  Procedure for colposcopy and biopsy was explained to the patient and consent obtained.  All the patient's questions were answered.    PROCEDURE:  COLPOSCOPY  After a procedural timeout was taken, she was positioned in dorsal lithotomy and a speculum was inserted to allow visualization of the cervix. A 5% acetic acid solution was applied to the ectocervix with large swabs. Lugols solution was also applied.  Colposcopic examination was then undertaken of the cervix, distal vaginal canal and vaginal fornices.    FINDINGS:Physical Exam  Genitourinary:              Procedures    Plan: Specimens labelled and sent to pathology., Will base further treatment on pathology findings. and post biopsy instructions given to patient.      Audi Celeste MD

## 2020-08-05 NOTE — NURSING NOTE
"Chief Complaint   Patient presents with     Colposcopy     ASCUS HPV HR+       Initial /70 (BP Location: Left arm, Patient Position: Chair, Cuff Size: Adult Regular)  Estimated body mass index is 23.73 kg/m  as calculated from the following:    Height as of 7/3/20: 1.715 m (5' 7.5\").    Weight as of 7/3/20: 69.8 kg (153 lb 12.8 oz).  BP completed using cuff size: regular    Questioned patient about current smoking habits.  Pt. currently smokes.  Advised about smoking cessation.          The following HM Due: NONE  Amara Mooney CMA    "

## 2020-08-06 LAB — COPATH REPORT: NORMAL

## 2020-09-16 ENCOUNTER — TELEPHONE (OUTPATIENT)
Dept: INTERNAL MEDICINE | Facility: CLINIC | Age: 40
End: 2020-09-16

## 2020-09-16 DIAGNOSIS — I10 HTN, GOAL BELOW 140/90: ICD-10-CM

## 2020-09-16 DIAGNOSIS — Z00.00 ROUTINE GENERAL MEDICAL EXAMINATION AT A HEALTH CARE FACILITY: ICD-10-CM

## 2020-09-16 DIAGNOSIS — E55.9 VITAMIN D DEFICIENCY: Primary | ICD-10-CM

## 2020-09-16 NOTE — TELEPHONE ENCOUNTER
Future lab orders placed in this telephone encounter have .  Patient requesting updated future orders placed in chart.  See telephone encounter 20.

## 2020-09-16 NOTE — TELEPHONE ENCOUNTER
Future lab orders from 3-11-19 .  Patient asking to have those future orders placed in chart again.  Will schedule a fasting lab appointment.    Future orders placed in chart.

## 2020-10-02 DIAGNOSIS — F41.1 GENERALIZED ANXIETY DISORDER: ICD-10-CM

## 2020-10-02 RX ORDER — LORAZEPAM 0.5 MG/1
TABLET ORAL
Qty: 30 TABLET | Refills: 0 | Status: SHIPPED | OUTPATIENT
Start: 2020-10-02 | End: 2021-01-29

## 2020-10-02 NOTE — TELEPHONE ENCOUNTER
Controlled Substance Refill Request for Lorazepam 0.5 mg  Problem List Complete:  No     PROVIDER TO CONSIDER COMPLETION OF PROBLEM LIST AND OVERVIEW/CONTROLLED SUBSTANCE AGREEMENT    Last Written Prescription Date:  3/24/2020  Last Fill Quantity: 30,   # refills: 0    Last Office Visit with [facility] primary care provider: 5/28/2020    Future Office visit:     Controlled substance agreement:   Encounter-Level CSA:    There are no encounter-level csa.     Patient-Level CSA:    There are no patient-level csa.         Last Urine Drug Screen: No results found for: CDAUT, No results found for: COMDAT, No results found for: THC13, PCP13, COC13, MAMP13, OPI13, AMP13, BZO13, TCA13, MTD13, BAR13, OXY13, PPX13, BUP13     Processing:  Rx to be electronically transmitted to pharmacy by provider      [email or web address]      [place] ([place] reviewed:  reviewed- no concerns

## 2020-11-03 DIAGNOSIS — E66.3 OVERWEIGHT: ICD-10-CM

## 2020-11-03 DIAGNOSIS — R73.9 BLOOD SUGAR INCREASED: ICD-10-CM

## 2020-11-03 DIAGNOSIS — I10 HTN, GOAL BELOW 140/90: ICD-10-CM

## 2020-11-03 DIAGNOSIS — E55.9 VITAMIN D DEFICIENCY: ICD-10-CM

## 2020-11-06 RX ORDER — ATENOLOL 25 MG/1
25 TABLET ORAL DAILY
Qty: 90 TABLET | Refills: 1 | Status: SHIPPED | OUTPATIENT
Start: 2020-11-06 | End: 2021-03-18

## 2020-11-06 RX ORDER — LISINOPRIL 20 MG/1
20 TABLET ORAL 2 TIMES DAILY
Qty: 180 TABLET | Refills: 1 | Status: SHIPPED | OUTPATIENT
Start: 2020-11-06 | End: 2021-03-18

## 2020-11-07 ENCOUNTER — HEALTH MAINTENANCE LETTER (OUTPATIENT)
Age: 40
End: 2020-11-07

## 2020-11-16 ENCOUNTER — E-VISIT (OUTPATIENT)
Dept: FAMILY MEDICINE | Facility: CLINIC | Age: 40
End: 2020-11-16
Payer: COMMERCIAL

## 2020-11-16 DIAGNOSIS — Z20.822 SUSPECTED COVID-19 VIRUS INFECTION: Primary | ICD-10-CM

## 2020-11-16 PROCEDURE — 99421 OL DIG E/M SVC 5-10 MIN: CPT | Performed by: NURSE PRACTITIONER

## 2020-11-16 NOTE — PATIENT INSTRUCTIONS
"  Dear Tati Hamilton,    Your symptoms show that you may have coronavirus (COVID-19). This illness can cause fever, cough and trouble breathing. Many people get a mild case and get better on their own. Some people can get very sick.    Will I be tested for COVID-19?  We would like to test you for this virus. I have placed an order for this test and please call [phone] to schedule testing. [employer] employees please call [phone]. [employer] employees call [phone].     When it's time for your COVID test:  Stay at least 6 feet away from others. (If someone will drive you to your test, stay in the backseat, as far away from the  as you can.)  Cover your mouth and nose with a mask, tissue or washcloth.  Go straight to the testing site. Don't make any stops on the way there or back.    Starting now:     Do not go to work.   o If you receive a negative COVID-19 test result and were NOT exposed to someone with a known positive COVID-19 test, you can return to work once you're free of fever for 24 hours without fever-reducing medication and your symptoms are improving or resolved.  o If you receive a positive COVID-19 test result, you must be cleared by Employee Occupational Health and Safety to return to work.   o If you were exposed to someone who has tested positive for COVID-19, you can return to work 14 days after your last contact with the positive individual, provided you do not have symptoms at all during that time. In some cases, your manager may ask you to come back sooner than 14 days.     During this time, don't leave the house except for testing or medical care.  o Stay in your own room, even for meals. Use your own bathroom if you can.  o Stay away from others in your home. No hugging, kissing or shaking hands. No visitors.  o Don't go to work, school or anywhere else.    Clean \"high touch\" surfaces often (doorknobs, counters, handles, etc.). Use a household cleaning spray or " wipes. You'll find a full list of  on the EPA website: www.epa.gov/pesticide-registration/list-n-disinfectants-use-against-sars-cov-2.    Cover your mouth and nose with a mask, tissue or washcloth to avoid spreading germs.    Wash your hands and face often. Use soap and water.    People in these groups are at risk for severe illness due to COVID-19:  o People 65 years and older  o People who live in a nursing home or long-term care facility  o People with chronic disease (lung, heart, cancer, diabetes, kidney, liver, immunologic)  o People who have a weakened immune system, including those who:  - Are in cancer treatment  - Take medicine that weakens the immune system, such as corticosteroids  - Had a bone marrow or organ transplant  - Have an immune deficiency  - Have poorly controlled HIV or AIDS  - Are obese (body mass index of 40 or higher)  - Smoke regularly      Caregivers should wear gloves while washing dishes, handling laundry and cleaning bedrooms and bathrooms.    Use caution when washing and drying laundry: Don't shake dirty laundry, and use the warmest water setting that you can.    For more tips, go to www.cdc.gov/coronavirus/2019-ncov/downloads/10Things.pdf.    Sign up for [employer]. We know it's scary to hear that you might have COVID-19. We want to track your symptoms to make sure you're okay over the next 2 weeks. Please look for an email from [employer]--this is a free, online program that we'll use to keep in touch. To sign up, follow the link in the email you will receive. Learn more at [email or web address]    How can I take care of myself?    Get lots of rest. Drink extra fluids (unless a doctor has told you not to)    Take Tylenol (acetaminophen) for fever or pain. If you have liver or kidney problems, ask your family doctor if it's okay to take Tylenol.  Adults can take either:    650 mg (two 325 mg pills) every 4 to 6 hours, or     1,000 mg (two 500 mg pills) every  8 hours as needed.    Note: Don't take more than 3,000 mg in one day. Acetaminophen is found in many medicines (both prescribed and over-the-counter medicines). Read all labels to be sure you don't take too much.  For children, check the Tylenol bottle for the right dose. The dose is based on the child's age or weight.    If you have other health problems (like cancer, heart failure, an organ transplant or severe kidney disease): Call your specialty clinic if you don't feel better in the next 2 days.    Know when to call 911. Emergency warning signs include:  Trouble breathing or shortness of breath  Pain or pressure in the chest that doesn't go away  Feeling confused like you haven't felt before, or not being able to wake up  Bluish-colored lips or face    Where can I get more information?     [employer] - About COVID-19: [email or web address]  CDC - What to Do If You're Sick: www.cdc.gov/coronavirus/2019-ncov/about/steps-when-sick.html

## 2020-11-17 ENCOUNTER — OFFICE VISIT (OUTPATIENT)
Dept: URGENT CARE | Facility: URGENT CARE | Age: 40
End: 2020-11-17
Payer: COMMERCIAL

## 2020-11-17 DIAGNOSIS — Z20.822 SUSPECTED COVID-19 VIRUS INFECTION: ICD-10-CM

## 2020-11-17 PROCEDURE — U0003 INFECTIOUS AGENT DETECTION BY NUCLEIC ACID (DNA OR RNA); SEVERE ACUTE RESPIRATORY SYNDROME CORONAVIRUS 2 (SARS-COV-2) (CORONAVIRUS DISEASE [COVID-19]), AMPLIFIED PROBE TECHNIQUE, MAKING USE OF HIGH THROUGHPUT TECHNOLOGIES AS DESCRIBED BY CMS-2020-01-R: HCPCS | Performed by: NURSE PRACTITIONER

## 2020-11-18 LAB
SARS-COV-2 RNA SPEC QL NAA+PROBE: NOT DETECTED
SPECIMEN SOURCE: NORMAL

## 2021-01-15 ENCOUNTER — OFFICE VISIT (OUTPATIENT)
Dept: INTERNAL MEDICINE | Facility: CLINIC | Age: 41
End: 2021-01-15
Payer: COMMERCIAL

## 2021-01-15 ENCOUNTER — PATIENT OUTREACH (OUTPATIENT)
Dept: OBGYN | Facility: CLINIC | Age: 41
End: 2021-01-15

## 2021-01-15 VITALS — DIASTOLIC BLOOD PRESSURE: 75 MMHG | HEART RATE: 90 BPM | SYSTOLIC BLOOD PRESSURE: 115 MMHG

## 2021-01-15 DIAGNOSIS — I10 HTN, GOAL BELOW 140/90: ICD-10-CM

## 2021-01-15 DIAGNOSIS — Z00.00 ROUTINE GENERAL MEDICAL EXAMINATION AT A HEALTH CARE FACILITY: ICD-10-CM

## 2021-01-15 DIAGNOSIS — B97.7 HIGH RISK HPV INFECTION: ICD-10-CM

## 2021-01-15 DIAGNOSIS — D75.89 MACROCYTOSIS WITHOUT ANEMIA: ICD-10-CM

## 2021-01-15 DIAGNOSIS — E55.9 VITAMIN D DEFICIENCY: ICD-10-CM

## 2021-01-15 DIAGNOSIS — R00.0 TACHYCARDIA: Primary | ICD-10-CM

## 2021-01-15 LAB
ERYTHROCYTE [DISTWIDTH] IN BLOOD BY AUTOMATED COUNT: 12.9 % (ref 10–15)
HCT VFR BLD AUTO: 39 % (ref 35–47)
HGB BLD-MCNC: 13.3 G/DL (ref 11.7–15.7)
MCH RBC QN AUTO: 37.7 PG (ref 26.5–33)
MCHC RBC AUTO-ENTMCNC: 34.1 G/DL (ref 31.5–36.5)
MCV RBC AUTO: 111 FL (ref 78–100)
PLATELET # BLD AUTO: 259 10E9/L (ref 150–450)
RBC # BLD AUTO: 3.53 10E12/L (ref 3.8–5.2)
WBC # BLD AUTO: 6.5 10E9/L (ref 4–11)

## 2021-01-15 PROCEDURE — 36415 COLL VENOUS BLD VENIPUNCTURE: CPT | Performed by: INTERNAL MEDICINE

## 2021-01-15 PROCEDURE — 80053 COMPREHEN METABOLIC PANEL: CPT | Performed by: INTERNAL MEDICINE

## 2021-01-15 PROCEDURE — 85027 COMPLETE CBC AUTOMATED: CPT | Performed by: INTERNAL MEDICINE

## 2021-01-15 PROCEDURE — 82746 ASSAY OF FOLIC ACID SERUM: CPT | Performed by: INTERNAL MEDICINE

## 2021-01-15 PROCEDURE — 82306 VITAMIN D 25 HYDROXY: CPT | Performed by: INTERNAL MEDICINE

## 2021-01-15 PROCEDURE — 99213 OFFICE O/P EST LOW 20 MIN: CPT | Performed by: INTERNAL MEDICINE

## 2021-01-15 PROCEDURE — 84443 ASSAY THYROID STIM HORMONE: CPT | Performed by: INTERNAL MEDICINE

## 2021-01-15 PROCEDURE — 82607 VITAMIN B-12: CPT | Performed by: INTERNAL MEDICINE

## 2021-01-15 NOTE — PROGRESS NOTES
Patient's instructions / PLAN:                                                        Plan:  1. Continue same meds, same doses for now   2. If the blood pressure stays constantly above 140 take additional dose Lisinopril 10 mg or Atenolol 25 mg daily   3.  Labs today - [address]   4. ecg today         ASSESSMENT & PLAN:                                                        (I10) HTN, goal below 140/90  Comment: Controlled    Plan: as above         Chief Complaint:                                                      BP      SUBJECTIVE:                                                    History of present illness     High BP at home -- 140s yesterday and she felt  lightheaded   And some palpitations with the HR 90-100s     Takes Atenolol 25 mg + Lisinopril 20 mg daily     ROS:                                                      ROS: negative for fever, chills, cough, wheezes, chest pain, shortness of breath, vomiting, abdominal pain, leg swelling       OBJECTIVE:                                                    Physical Exam :    Blood pressure (!) 130/94, not currently breastfeeding.   NAD, appears comfortable  Skin: no rashes   Neck: supple, no JVD, No thyroidmegaly. Lymph nodes nonpalpable cervical and supraclavicular.  Chest: clear to auscultation bilaterally, good respiratory effort  Heart: S1 S2, RRR, no mgr appreciated  Abdomen: soft, not tender,  Extremities: no edema,   Neurologic: A, Ox3, no focal signs appreciated    PMHx: reviewed  Past Medical History:   Diagnosis Date     Anxiety      Calculus of kidney      Depressive disorder      Depressive disorder, not elsewhere classified      ESTEPHANIA (generalised anxiety disorder)      High risk HPV infection 3/20/15,10/17/16    Not HPV 16 or 18, HR, Not 16 or 18     HTN, goal below 140/90      Lung nodules May 2010    Needs f/u CT Nov 2010     Multiple sclerosis (H) Dx in 2001     Multiple sclerosis (H)      PONV (postoperative nausea and vomiting)       Vitamin D deficiencies       PSHx: reviewed  Past Surgical History:   Procedure Laterality Date     HC COLP CERVIX/UPPER VAGINA W LOOP ELEC BX CERVIX       HC REMOVAL OF TONSILS,<11 Y/O      Tonsils <12y.o.     LAPAROSCOPIC CHOLECYSTECTOMY  5/8/2014    Procedure: LAPAROSCOPIC CHOLECYSTECTOMY;  Surgeon: Sona Giraldo MD;  Location:  OR     [facility] NONSPECIFIC PROCEDURE      2 procedures for kidney stones        Meds: reviewed  Current Outpatient Medications   Medication Sig Dispense Refill     albuterol (PROAIR HFA/PROVENTIL HFA/VENTOLIN HFA) 108 (90 Base) MCG/ACT inhaler Inhale 2 puffs into the lungs every 6 hours (Patient not taking: Reported on 8/5/2020) 1 Inhaler 0     atenolol (TENORMIN) 25 MG tablet Take 1 tablet (25 mg) by mouth daily 90 tablet 1     hydrOXYzine (VISTARIL) 25 MG capsule Take 1 capsule (25 mg) by mouth 3 times daily as needed for anxiety 90 capsule 1     lisinopril (ZESTRIL) 20 MG tablet Take 1 tablet (20 mg) by mouth 2 times daily 180 tablet 1     LORazepam (ATIVAN) 0.5 MG tablet 1 po  qd prn anxiety 30 tablet 0     order for DME Equipment being ordered: [employer] DREAM STATION WITH HH AND MIRAGE FX FOR HER NASAL MASK WITH CHINSTRAP.  PRESSURE 7-15 CM.       vitamin D2 (ERGOCALCIFEROL) 05600 units (1250 mcg) capsule Take 1 capsule (50,000 Units) by mouth once a week 12 capsule 1       Soc Hx: reviewed  Fam Hx: reviewed          Gely Garcia MD  Internal Medicine

## 2021-01-15 NOTE — LETTER
July 20, 2021      Tati Hamilton  [address] [address]        Dear ,    This letter is to remind you that you are due for your follow-up Pap smear and Human Papillomavirus (HPV) test.    Please call [phone] to schedule your appointment at your earliest convenience.    If you have completed the appointment outside of the [facility] system, please have the records forwarded to our office. We will update your chart for your provider to review before your next annual wellness visit.     Thank you for choosing [facility]!      Sincerely,    Your [facility] Care Team

## 2021-01-15 NOTE — PATIENT INSTRUCTIONS
Plan:  1. Continue same meds, same doses for now   2. If the blood pressure stays constantly above 140 take additional dose Lisinopril 10 mg or Atenolol 25 mg daily   3.  Labs today - [address]   4. ecg today

## 2021-01-16 LAB
ALBUMIN SERPL-MCNC: 3.5 G/DL (ref 3.4–5)
ALP SERPL-CCNC: 106 U/L (ref 40–150)
ALT SERPL W P-5'-P-CCNC: 131 U/L (ref 0–50)
ANION GAP SERPL CALCULATED.3IONS-SCNC: 7 MMOL/L (ref 3–14)
AST SERPL W P-5'-P-CCNC: 147 U/L (ref 0–45)
BILIRUB SERPL-MCNC: 0.7 MG/DL (ref 0.2–1.3)
BUN SERPL-MCNC: 6 MG/DL (ref 7–30)
CALCIUM SERPL-MCNC: 9 MG/DL (ref 8.5–10.1)
CHLORIDE SERPL-SCNC: 100 MMOL/L (ref 94–109)
CO2 SERPL-SCNC: 27 MMOL/L (ref 20–32)
CREAT SERPL-MCNC: 0.65 MG/DL (ref 0.52–1.04)
GFR SERPL CREATININE-BSD FRML MDRD: >90 ML/MIN/{1.73_M2}
GLUCOSE SERPL-MCNC: 90 MG/DL (ref 70–99)
POTASSIUM SERPL-SCNC: 3.7 MMOL/L (ref 3.4–5.3)
PROT SERPL-MCNC: 7.8 G/DL (ref 6.8–8.8)
SODIUM SERPL-SCNC: 134 MMOL/L (ref 133–144)
TSH SERPL DL<=0.005 MIU/L-ACNC: 1.14 MU/L (ref 0.4–4)

## 2021-01-18 ENCOUNTER — TELEPHONE (OUTPATIENT)
Dept: INTERNAL MEDICINE | Facility: CLINIC | Age: 41
End: 2021-01-18

## 2021-01-18 LAB
DEPRECATED CALCIDIOL+CALCIFEROL SERPL-MC: 19 UG/L (ref 20–75)
FOLATE SERPL-MCNC: NORMAL NG/ML
VIT B12 SERPL-MCNC: 687 PG/ML (ref 193–986)

## 2021-01-18 NOTE — TELEPHONE ENCOUNTER
Nitish from lab calling.  States patient's blood hemolyzed so unable to do the folate lab.    Patient informed need to be re-drawn.    Future order for folate in chart.

## 2021-01-20 ENCOUNTER — MYC MEDICAL ADVICE (OUTPATIENT)
Dept: INTERNAL MEDICINE | Facility: CLINIC | Age: 41
End: 2021-01-20

## 2021-01-20 DIAGNOSIS — I10 HTN, GOAL BELOW 140/90: ICD-10-CM

## 2021-01-20 DIAGNOSIS — R79.89 LFTS ABNORMAL: Primary | ICD-10-CM

## 2021-01-20 DIAGNOSIS — E66.3 OVERWEIGHT: ICD-10-CM

## 2021-01-20 DIAGNOSIS — E55.9 VITAMIN D DEFICIENCY: ICD-10-CM

## 2021-01-20 DIAGNOSIS — R73.9 BLOOD SUGAR INCREASED: ICD-10-CM

## 2021-01-20 RX ORDER — ERGOCALCIFEROL 1.25 MG/1
50000 CAPSULE, LIQUID FILLED ORAL WEEKLY
Qty: 12 CAPSULE | Refills: 1 | Status: SHIPPED | OUTPATIENT
Start: 2021-01-20 | End: 2024-09-20

## 2021-01-29 DIAGNOSIS — F41.1 GENERALIZED ANXIETY DISORDER: ICD-10-CM

## 2021-01-29 NOTE — TELEPHONE ENCOUNTER
Lorazepam refill request.     Last refill on 10/2/20 for #30     Last OV on 1/15/21    Routing refill request to provider for review/approval because:  Drug not on the [facility] refill protocol

## 2021-01-30 RX ORDER — LORAZEPAM 0.5 MG/1
TABLET ORAL
Qty: 30 TABLET | Refills: 0 | Status: SHIPPED | OUTPATIENT
Start: 2021-01-30 | End: 2021-03-19

## 2021-02-17 ENCOUNTER — IMMUNIZATION (OUTPATIENT)
Dept: NURSING | Facility: CLINIC | Age: 41
End: 2021-02-17
Payer: COMMERCIAL

## 2021-02-17 PROCEDURE — 91300 PR COVID VAC PFIZER DIL RECON 30 MCG/0.3 ML IM: CPT

## 2021-02-17 PROCEDURE — 0001A PR COVID VAC PFIZER DIL RECON 30 MCG/0.3 ML IM: CPT

## 2021-02-22 DIAGNOSIS — R79.89 LFTS ABNORMAL: ICD-10-CM

## 2021-02-22 DIAGNOSIS — E66.3 OVERWEIGHT: ICD-10-CM

## 2021-02-22 DIAGNOSIS — Z00.00 ROUTINE GENERAL MEDICAL EXAMINATION AT A HEALTH CARE FACILITY: ICD-10-CM

## 2021-02-22 DIAGNOSIS — R73.9 BLOOD SUGAR INCREASED: ICD-10-CM

## 2021-02-22 DIAGNOSIS — D75.89 MACROCYTOSIS WITHOUT ANEMIA: ICD-10-CM

## 2021-02-22 DIAGNOSIS — E55.9 VITAMIN D DEFICIENCY: ICD-10-CM

## 2021-02-22 DIAGNOSIS — I10 HTN, GOAL BELOW 140/90: ICD-10-CM

## 2021-02-22 LAB — FOLATE SERPL-MCNC: 17.7 NG/ML

## 2021-02-22 PROCEDURE — 86803 HEPATITIS C AB TEST: CPT | Performed by: INTERNAL MEDICINE

## 2021-02-22 PROCEDURE — 82043 UR ALBUMIN QUANTITATIVE: CPT | Performed by: INTERNAL MEDICINE

## 2021-02-22 PROCEDURE — 80061 LIPID PANEL: CPT | Performed by: INTERNAL MEDICINE

## 2021-02-22 PROCEDURE — 36415 COLL VENOUS BLD VENIPUNCTURE: CPT | Performed by: INTERNAL MEDICINE

## 2021-02-22 PROCEDURE — 82746 ASSAY OF FOLIC ACID SERUM: CPT | Performed by: INTERNAL MEDICINE

## 2021-02-22 PROCEDURE — 80076 HEPATIC FUNCTION PANEL: CPT | Performed by: INTERNAL MEDICINE

## 2021-02-23 LAB
ALBUMIN SERPL-MCNC: 3.5 G/DL (ref 3.4–5)
ALP SERPL-CCNC: 72 U/L (ref 40–150)
ALT SERPL W P-5'-P-CCNC: 39 U/L (ref 0–50)
AST SERPL W P-5'-P-CCNC: 18 U/L (ref 0–45)
BILIRUB DIRECT SERPL-MCNC: <0.1 MG/DL (ref 0–0.2)
BILIRUB SERPL-MCNC: 0.4 MG/DL (ref 0.2–1.3)
CHOLEST SERPL-MCNC: 153 MG/DL
CREAT UR-MCNC: 204 MG/DL
HCV AB SERPL QL IA: NONREACTIVE
HDLC SERPL-MCNC: 43 MG/DL
LDLC SERPL CALC-MCNC: 89 MG/DL
MICROALBUMIN UR-MCNC: 12 MG/L
MICROALBUMIN/CREAT UR: 5.93 MG/G CR (ref 0–25)
NONHDLC SERPL-MCNC: 110 MG/DL
PROT SERPL-MCNC: 7.3 G/DL (ref 6.8–8.8)
TRIGL SERPL-MCNC: 107 MG/DL

## 2021-03-02 ENCOUNTER — OFFICE VISIT (OUTPATIENT)
Dept: INTERNAL MEDICINE | Facility: CLINIC | Age: 41
End: 2021-03-02
Payer: COMMERCIAL

## 2021-03-02 VITALS
RESPIRATION RATE: 16 BRPM | WEIGHT: 153 LBS | DIASTOLIC BLOOD PRESSURE: 86 MMHG | BODY MASS INDEX: 23.61 KG/M2 | HEART RATE: 82 BPM | SYSTOLIC BLOOD PRESSURE: 119 MMHG

## 2021-03-02 DIAGNOSIS — K62.5 RECTAL BLEEDING: Primary | ICD-10-CM

## 2021-03-02 PROCEDURE — 99213 OFFICE O/P EST LOW 20 MIN: CPT | Performed by: INTERNAL MEDICINE

## 2021-03-02 NOTE — PROGRESS NOTES
Rectal bleeding on/pff > 1 year, more in the last 2 weeks.   No pain  She feels a lump    Exam; soft mass at 12:00, mildly tender, no blood      Colorectal surgeon referral - done       (K62.5) Rectal bleeding  (primary encounter diagnosis)  Comment:   Plan: COLORECTAL SURGERY REFERRAL               Hypertension Follow-up      Do you check your blood pressure regularly outside of the clinic? No     Are you following a low salt diet? Yes    Are your blood pressures ever more than 140 on the top number (systolic) OR more   than 90 on the bottom number (diastolic), for example 140/90? No      How many servings of fruits and vegetables do you eat daily?  2-3    On average, how many sweetened beverages do you drink each day (Examples: soda, juice, sweet tea, etc.  Do NOT count diet or artificially sweetened beverages)?   0    How many days per week do you exercise enough to make your heart beat faster? 5    How many minutes a day do you exercise enough to make your heart beat faster? 10 - 19    How many days per week do you miss taking your medication? 0

## 2021-03-10 ENCOUNTER — IMMUNIZATION (OUTPATIENT)
Dept: NURSING | Facility: CLINIC | Age: 41
End: 2021-03-10
Attending: INTERNAL MEDICINE
Payer: COMMERCIAL

## 2021-03-10 PROCEDURE — 0002A PR COVID VAC PFIZER DIL RECON 30 MCG/0.3 ML IM: CPT

## 2021-03-10 PROCEDURE — 91300 PR COVID VAC PFIZER DIL RECON 30 MCG/0.3 ML IM: CPT

## 2021-03-16 ENCOUNTER — DOCUMENTATION ONLY (OUTPATIENT)
Dept: ADMINISTRATIVE | Facility: CLINIC | Age: 41
End: 2021-03-16

## 2021-03-16 DIAGNOSIS — I10 HTN, GOAL BELOW 140/90: ICD-10-CM

## 2021-03-16 DIAGNOSIS — E66.3 OVERWEIGHT: ICD-10-CM

## 2021-03-16 DIAGNOSIS — E55.9 VITAMIN D DEFICIENCY: ICD-10-CM

## 2021-03-16 DIAGNOSIS — R73.9 BLOOD SUGAR INCREASED: ICD-10-CM

## 2021-03-18 DIAGNOSIS — F41.1 GENERALIZED ANXIETY DISORDER: ICD-10-CM

## 2021-03-18 RX ORDER — ATENOLOL 25 MG/1
25 TABLET ORAL DAILY
Qty: 90 TABLET | Refills: 1 | Status: SHIPPED | OUTPATIENT
Start: 2021-03-18 | End: 2021-03-18

## 2021-03-18 RX ORDER — LORAZEPAM 0.5 MG/1
TABLET ORAL
Qty: 30 TABLET | Refills: 0 | Status: CANCELLED | OUTPATIENT
Start: 2021-03-18

## 2021-03-18 RX ORDER — LISINOPRIL 20 MG/1
20 TABLET ORAL 2 TIMES DAILY
Qty: 180 TABLET | Refills: 1 | Status: SHIPPED | OUTPATIENT
Start: 2021-03-18 | End: 2021-03-18

## 2021-03-18 RX ORDER — LISINOPRIL 20 MG/1
20 TABLET ORAL 2 TIMES DAILY
Qty: 180 TABLET | Refills: 1 | Status: SHIPPED | OUTPATIENT
Start: 2021-03-18 | End: 2021-10-12

## 2021-03-18 RX ORDER — ATENOLOL 25 MG/1
25 TABLET ORAL DAILY
Qty: 90 TABLET | Refills: 1 | Status: SHIPPED | OUTPATIENT
Start: 2021-03-18 | End: 2021-05-10

## 2021-03-19 DIAGNOSIS — F41.1 GENERALIZED ANXIETY DISORDER: ICD-10-CM

## 2021-03-19 RX ORDER — LORAZEPAM 0.5 MG/1
TABLET ORAL
Qty: 30 TABLET | Refills: 0 | Status: SHIPPED | OUTPATIENT
Start: 2021-03-19 | End: 2021-05-10

## 2021-03-19 NOTE — TELEPHONE ENCOUNTER
Controlled Substance Refill Request for Lorazepam 0.5 mg  Problem List Complete:  No     PROVIDER TO CONSIDER COMPLETION OF PROBLEM LIST AND OVERVIEW/CONTROLLED SUBSTANCE AGREEMENT    Last Written Prescription Date:  1/30/21  Last Fill Quantity: 30,   # refills: 0    Last Office Visit with [facility] primary care provider: 3/2/21    Future Office visit:     Controlled substance agreement:   Encounter-Level CSA:    There are no encounter-level csa.     Patient-Level CSA:    There are no patient-level csa.         Last Urine Drug Screen: No results found for: CDAUT, No results found for: COMDAT, No results found for: THC13, PCP13, COC13, MAMP13, OPI13, AMP13, BZO13, TCA13, MTD13, BAR13, OXY13, PPX13, BUP13     Processing:  Rx to be electronically transmitted to pharmacy by provider      [email or web address]    RX [place] ([place] reviewed:  reviewed- no concerns

## 2021-03-28 ENCOUNTER — E-VISIT (OUTPATIENT)
Dept: FAMILY MEDICINE | Facility: CLINIC | Age: 41
End: 2021-03-28
Payer: COMMERCIAL

## 2021-03-28 DIAGNOSIS — N76.0 BACTERIAL VAGINOSIS: Primary | ICD-10-CM

## 2021-03-28 DIAGNOSIS — B96.89 BACTERIAL VAGINOSIS: Primary | ICD-10-CM

## 2021-03-28 PROCEDURE — 99421 OL DIG E/M SVC 5-10 MIN: CPT | Performed by: NURSE PRACTITIONER

## 2021-03-28 RX ORDER — METRONIDAZOLE 7.5 MG/G
1 GEL VAGINAL AT BEDTIME
Qty: 70 G | Refills: 0 | Status: SHIPPED | OUTPATIENT
Start: 2021-03-28 | End: 2021-04-02

## 2021-03-28 NOTE — PATIENT INSTRUCTIONS
Thank you for choosing us for your care. I have placed an order for a prescription so that you can start treatment. View your full visit summary for details by clicking on the link below. Your pharmacist will able to address any questions you may have about the medication.     If you re not feeling better within 2-3 days, please schedule an appointment.  You can schedule an appointment right here in [facility], or call [phone]  If the visit is for the same symptoms as your eVisit, we ll refund the cost of your eVisit if seen within seven days.      Bacterial Vaginosis    You have a vaginal infection called bacterial vaginosis (BV). Both good and bad bacteria are present in a healthy vagina. BV occurs when these bacteria get out of balance. The number of bad bacteria increase. And the number of good bacteria decrease. BV is linked with sexual activity, but it's not a sexually transmitted infection (STI).   BV may or may not cause symptoms. If symptoms do occur, they can include:     Thin, gray, milky-white, or sometimes green discharge    Unpleasant odor or  fishy  smell    Itching, burning, or pain in or around the vagina  It is not known what causes BV, but certain factors can make the problem more likely. These can include:     Douching    Spermicides    Use of antibiotics    Change in hormone levels with pregnancy, breastfeeding, or menopause    Having sex with a new partner    Having sex with more than one partner  BV will sometimes go away on its own. But treatment is often advised. This is because untreated BV can raise the risk of more serious health problems such as:     Pelvic inflammatory disease (PID)     delivery (giving birth to a baby early if you re pregnant)    HIV and some other sexually transmitted infections (STIs)    Infection after surgery on the reproductive organs  Home care  General care    BV is most often treated with medicines called antibiotics. These may be given as pills or  as a vaginal cream. If antibiotics are prescribed, be sure to use them exactly as directed. And complete all of the medicine, even if your symptoms go away.    Don't douche or having sex during treatment.    If you have sex with a female partner, ask your healthcare provider if she should also be treated.  Prevention    Don't douche.    Don't have sex. If you do have sex, then take steps to lower your risk:  ? Use condoms when having sex.  ? Limit the number of sex partners you have.    Follow-up care  Follow up with your healthcare provider, or as advised.   When to get medical advice  Call your healthcare provider right away if:     You have a fever of 100.4 F (38 C) or higher, or as directed by your provider.    Your symptoms get worse, or they don t go away within a few days of starting treatment.    You have new pain in the lower belly or pelvic region.    You have side effects that bother you or a reaction to the pills or cream you re prescribed.    You or any of your sex partners have new symptoms, such as a rash, joint pain, or sores.  [employer] last reviewed this educational content on 6/1/2020 2000-2020 [employer]. All rights reserved. This information is not intended as a substitute for professional medical care. Always follow your healthcare professional's instructions.

## 2021-04-15 ENCOUNTER — OFFICE VISIT (OUTPATIENT)
Dept: SURGERY | Facility: CLINIC | Age: 41
End: 2021-04-15
Attending: INTERNAL MEDICINE
Payer: COMMERCIAL

## 2021-04-15 ENCOUNTER — PRE VISIT (OUTPATIENT)
Dept: SURGERY | Facility: CLINIC | Age: 41
End: 2021-04-15

## 2021-04-15 VITALS
WEIGHT: 163.6 LBS | TEMPERATURE: 98.3 F | HEIGHT: 67 IN | OXYGEN SATURATION: 100 % | SYSTOLIC BLOOD PRESSURE: 128 MMHG | DIASTOLIC BLOOD PRESSURE: 91 MMHG | BODY MASS INDEX: 25.68 KG/M2 | HEART RATE: 97 BPM

## 2021-04-15 DIAGNOSIS — K62.5 RECTAL BLEEDING: Primary | ICD-10-CM

## 2021-04-15 PROCEDURE — 99203 OFFICE O/P NEW LOW 30 MIN: CPT | Performed by: NURSE PRACTITIONER

## 2021-04-15 ASSESSMENT — PAIN SCALES - GENERAL: PAINLEVEL: NO PAIN (0)

## 2021-04-15 ASSESSMENT — MIFFLIN-ST. JEOR: SCORE: 1444.71

## 2021-04-15 NOTE — LETTER
"4/15/2021       RE: Tati Hamilton  [address]  [place] [address]     Dear Colleague,    Thank you for referring your patient, Tati Hamilton, to the [facility] [place] [facility]. Please see a copy of my visit note below.    Colon and Rectal Surgery Consult Clinic Note    Date: 4/15/2021     Referring provider:  Diana Gabriela [facility],  [place] [address]     RE: Tati Hamilton  : 1980  GEORGE: 4/15/2021    Tati Hamilton is a very pleasant 40 year old female who presents today for rectal bleeding.    HPI: Sharee reports that she has had rectal bleeding on and off for the past 2 to 3 years.  She has had some pain with bowel movements and stool sometimes feels like it gets stuck but no sharp pain with bowel movements.  She has some discomfort with prolonged sitting.  No change in stool caliber.  No unexplained weight loss.  She has intermittent loose and hard stools and reports that her mom has irritable bowel syndrome.  She has a family history of grandfather with rectal cancer in his 70s and both of her parents have polyps on colonoscopies.  She is otherwise healthy but does smoke 1 pack/day.  She is a history of KAMI-1 and has tested positive for \"other\" high risk HPV (not 16 or 18).     Physical Examination:  BP (!) 128/91 (BP Location: Left arm, Patient Position: Sitting, Cuff Size: Adult Regular)   Pulse 97   Temp 98.3  F (36.8  C) (Oral)   Ht 5' 7\"   Wt 163 lb 9.6 oz   SpO2 100%   BMI 25.62 kg/m    General: Alert, oriented, in no acute distress, sitting comfortably  HEENT: Mucous membranes moist    Perianal external examination: Exam was chaperoned by Jill Chambers MA  Perianal skin: Intact with no excoriation or lichenification.  Lesions: No evidence of an external lesion, nodularity, or induration in the perianal region.  Eversion of buttocks: " There was not evidence of an anal fissure. Details: N/A.  Skin tags or external hemorrhoids: None.  Digital rectal examination: Was performed.   Sphincter tone: Good.  Palpable lesions: No.  Other: None.  Bimanual examination: was not performed    Anoscopy: Was performed.   Hemorrhoids: Yes. Small internal hemorrhoids  Lesions: No    Assessment/Plan: 40 year old female with intermittent rectal bleeding. Some small internal hemorrhoids and she does occasionally have pain but no obvious anal fissure on exam. Given her family history, I recommended a colonoscopy at this time. Recommended starting a fiber supplement to improve stool consistency to see if this improves symptoms as well. Strongly advised smoking cessation. Patient's questions were answered to her stated satisfaction and she is in agreement with this plan.    Medical history:  Past Medical History:   Diagnosis Date     Anxiety      Calculus of kidney      Depressive disorder      Depressive disorder, not elsewhere classified      ESTEPHANIA (generalised anxiety disorder)      High risk HPV infection 3/20/15,10/17/16    Not HPV 16 or 18, HR, Not 16 or 18     History of colposcopy 08/05/2020     HTN, goal below 140/90      Lung nodules 05/2010    Needs f/u CT Nov 2010     Multiple sclerosis (H) Dx in 2001     Multiple sclerosis (H)      PONV (postoperative nausea and vomiting)      Vitamin D deficiencies        Surgical history:  Past Surgical History:   Procedure Laterality Date     HC COLP CERVIX/UPPER VAGINA W LOOP ELEC BX CERVIX       HC REMOVAL OF TONSILS,<13 Y/O      Tonsils <12y.o.     LAPAROSCOPIC CHOLECYSTECTOMY  5/8/2014    Procedure: LAPAROSCOPIC CHOLECYSTECTOMY;  Surgeon: Sona Giraldo MD;  Location:  OR     [facility] NONSPECIFIC PROCEDURE      2 procedures for kidney stones       Problem list:  Patient Active Problem List    Diagnosis Date Noted     Mild single current episode of major depressive disorder (H) 03/11/2019     Priority: Medium      CHARLES (obstructive sleep apnea) 10/07/2015     Priority: Medium     Severe CHARLES; on CPAP       High risk HPV infection 03/20/2015     Priority: Medium     3/20/15 Pap NIL with Pos HR HPR but not HPV 16 or 18. Plan: cotest in 1 year.   10/17/16 DX NL pap, +HPV HR, pt. Is to schedule a colp.  1/9/17 colp done. No bx taken. Plan: pap in 6-12 months.   6/13/18 NIL, +HR HPV, not 16/18. Plan colp  11/2/18 [place]- No visible lesions, no Bx taken. Plan pap in 1 year (from last pap), due by 6/13/19 07/18/19 Patient is lost to pap tracking follow-up.   7/3/20 ASCUS, +HR HPV (not 16/18). Plan: colposcopy  7/14/20 pt saw results/recommendation (lce)  8/5/20 [place] ECC: benign. Plan 1 year cotest       HTN, goal below 140/90      Priority: Medium     Vitamin D deficiency      Priority: Medium     (Problem list name updated by automated process. Provider to review and confirm.)       Generalized anxiety disorder      Priority: Medium     Diagnosis updated by automated process. Provider to review and confirm.       CARDIOVASCULAR SCREENING; LDL GOAL LESS THAN 130 10/31/2010     Priority: Medium     Papanicolaou smear of cervix with low grade squamous intraepithelial lesion (LGSIL) 03/03/2008     Priority: Medium     Multiple sclerosis (H) 03/29/2005     Priority: Medium     CALCULUS OF KIDNEY(aka NEPHROLITHIASIS) 02/12/2005     Priority: Medium       Medications:  Current Outpatient Medications   Medication Sig Dispense Refill     atenolol (TENORMIN) 25 MG tablet Take 1 tablet (25 mg) by mouth daily 90 tablet 1     hydrOXYzine (VISTARIL) 25 MG capsule Take 1 capsule (25 mg) by mouth 3 times daily as needed for anxiety 90 capsule 1     lisinopril (ZESTRIL) 20 MG tablet Take 1 tablet (20 mg) by mouth 2 times daily 180 tablet 1     LORazepam (ATIVAN) 0.5 MG tablet 1 po  qd prn anxiety 30 tablet 0     order for DME Equipment being ordered: [employer] DREAM STATION WITH HH AND MIRAGE FX FOR HER NASAL MASK WITH CHINSTRAP.  PRESSURE 7-15 CM.   "     vitamin D2 (ERGOCALCIFEROL) 49604 units (1250 mcg) capsule Take 1 capsule (50,000 Units) by mouth once a week 12 capsule 1     albuterol (PROAIR HFA/PROVENTIL HFA/VENTOLIN HFA) 108 (90 Base) MCG/ACT inhaler Inhale 2 puffs into the lungs every 6 hours (Patient not taking: Reported on 8/5/2020) 1 Inhaler 0       Allergies:  Allergies   Allergen Reactions     No Known Drug Allergies        Family history:  Family History   Problem Relation Age of Onset     Gallbladder Disease Mother      Other - See Comments Mother         varicose veins      Hypertension Father      Rectal Cancer Maternal Grandfather      Cerebrovascular Disease Maternal Grandmother      Heart Disease Paternal Grandfather        Social history:  Social History     Tobacco Use     Smoking status: Current Every Day Smoker     Packs/day: 0.50     Years: 4.00     Pack years: 2.00     Types: Cigarettes     Smokeless tobacco: Never Used     Tobacco comment: socially   Substance Use Topics     Alcohol use: Yes     Alcohol/week: 0.0 standard drinks     Comment: occasional    Marital status: .  Occupation: works at [employer].    Nursing Notes:   Barbara Cardoso CMA  4/15/2021  9:05 AM  Signed  Chief Complaint   Patient presents with     New Patient     New consult for rectal bleeding       Vitals:    04/15/21 0846   BP: (!) 128/91   BP Location: Left arm   Patient Position: Sitting   Cuff Size: Adult Regular   Pulse: 97   Temp: 98.3  F (36.8  C)   TempSrc: Oral   SpO2: 100%   Weight: 163 lb 9.6 oz   Height: 5' 7\"       Body mass index is 25.62 kg/m .          Barbara Lozano CMA         30 minutes spent on the date of the encounter doing chart review, history and exam, documentation and further activities as noted above.     BLANCA Fregoso, NP-C  Colon and Rectal Surgery   [facility]    This note was created using speech recognition software and may contain unintended word " substitutions.        Again, thank you for allowing me to participate in the care of your patient.      Sincerely,    BLANCA Castanon CNP

## 2021-04-15 NOTE — PROGRESS NOTES
"Colon and Rectal Surgery Consult Clinic Note    Date: 4/15/2021     Referring provider:  Gely Le MD  [address] [place] [address]     RE: Tati Hamilton  : 1980  GEORGE: 4/15/2021    Tati Hamilton is a very pleasant 40 year old female who presents today for rectal bleeding.    HPI: Sharee reports that she has had rectal bleeding on and off for the past 2 to 3 years.  She has had some pain with bowel movements and stool sometimes feels like it gets stuck but no sharp pain with bowel movements.  She has some discomfort with prolonged sitting.  No change in stool caliber.  No unexplained weight loss.  She has intermittent loose and hard stools and reports that her mom has irritable bowel syndrome.  She has a family history of grandfather with rectal cancer in his 70s and both of her parents have polyps on colonoscopies.  She is otherwise healthy but does smoke 1 pack/day.  She is a history of KAMI-1 and has tested positive for \"other\" high risk HPV (not 16 or 18).     Physical Examination:  BP (!) 128/91 (BP Location: Left arm, Patient Position: Sitting, Cuff Size: Adult Regular)   Pulse 97   Temp 98.3  F (36.8  C) (Oral)   Ht 5' 7\"   Wt 163 lb 9.6 oz   SpO2 100%   BMI 25.62 kg/m    General: Alert, oriented, in no acute distress, sitting comfortably  HEENT: Mucous membranes moist    Perianal external examination: Exam was chaperoned by Jill Chambers MA  Perianal skin: Intact with no excoriation or lichenification.  Lesions: No evidence of an external lesion, nodularity, or induration in the perianal region.  Eversion of buttocks: There was not evidence of an anal fissure. Details: N/A.  Skin tags or external hemorrhoids: None.  Digital rectal examination: Was performed.   Sphincter tone: Good.  Palpable lesions: No.  Other: None.  Bimanual examination: was not performed    Anoscopy: Was performed.   Hemorrhoids: Yes. Small internal hemorrhoids  Lesions: " No    Assessment/Plan: 40 year old female with intermittent rectal bleeding. Some small internal hemorrhoids and she does occasionally have pain but no obvious anal fissure on exam. Given her family history, I recommended a colonoscopy at this time. Recommended starting a fiber supplement to improve stool consistency to see if this improves symptoms as well. Strongly advised smoking cessation. Patient's questions were answered to her stated satisfaction and she is in agreement with this plan.    Medical history:  Past Medical History:   Diagnosis Date     Anxiety      Calculus of kidney      Depressive disorder      Depressive disorder, not elsewhere classified      ESTEPHANIA (generalised anxiety disorder)      High risk HPV infection 3/20/15,10/17/16    Not HPV 16 or 18, HR, Not 16 or 18     History of colposcopy 08/05/2020     HTN, goal below 140/90      Lung nodules 05/2010    Needs f/u CT Nov 2010     Multiple sclerosis (H) Dx in 2001     Multiple sclerosis (H)      PONV (postoperative nausea and vomiting)      Vitamin D deficiencies        Surgical history:  Past Surgical History:   Procedure Laterality Date     HC COLP CERVIX/UPPER VAGINA W LOOP ELEC BX CERVIX       HC REMOVAL OF TONSILS,<13 Y/O      Tonsils <12y.o.     LAPAROSCOPIC CHOLECYSTECTOMY  5/8/2014    Procedure: LAPAROSCOPIC CHOLECYSTECTOMY;  Surgeon: Sona Giraldo MD;  Location:  OR     [facility] NONSPECIFIC PROCEDURE      2 procedures for kidney stones       Problem list:  Patient Active Problem List    Diagnosis Date Noted     Mild single current episode of major depressive disorder (H) 03/11/2019     Priority: Medium     CHARLES (obstructive sleep apnea) 10/07/2015     Priority: Medium     Severe CHARLES; on CPAP       High risk HPV infection 03/20/2015     Priority: Medium     3/20/15 Pap NIL with Pos HR HPR but not HPV 16 or 18. Plan: cotest in 1 year.   10/17/16 DX NL pap, +HPV HR, pt. Is to schedule a colp.  1/9/17 colp done. No bx taken. Plan: pap  in 6-12 months.   6/13/18 NIL, +HR HPV, not 16/18. Plan colp  11/2/18 [place]- No visible lesions, no Bx taken. Plan pap in 1 year (from last pap), due by 6/13/19 07/18/19 Patient is lost to pap tracking follow-up.   7/3/20 ASCUS, +HR HPV (not 16/18). Plan: colposcopy  7/14/20 pt saw results/recommendation (lce)  8/5/20 [place] ECC: benign. Plan 1 year cotest       HTN, goal below 140/90      Priority: Medium     Vitamin D deficiency      Priority: Medium     (Problem list name updated by automated process. Provider to review and confirm.)       Generalized anxiety disorder      Priority: Medium     Diagnosis updated by automated process. Provider to review and confirm.       CARDIOVASCULAR SCREENING; LDL GOAL LESS THAN 130 10/31/2010     Priority: Medium     Papanicolaou smear of cervix with low grade squamous intraepithelial lesion (LGSIL) 03/03/2008     Priority: Medium     Multiple sclerosis (H) 03/29/2005     Priority: Medium     CALCULUS OF KIDNEY(aka NEPHROLITHIASIS) 02/12/2005     Priority: Medium       Medications:  Current Outpatient Medications   Medication Sig Dispense Refill     atenolol (TENORMIN) 25 MG tablet Take 1 tablet (25 mg) by mouth daily 90 tablet 1     hydrOXYzine (VISTARIL) 25 MG capsule Take 1 capsule (25 mg) by mouth 3 times daily as needed for anxiety 90 capsule 1     lisinopril (ZESTRIL) 20 MG tablet Take 1 tablet (20 mg) by mouth 2 times daily 180 tablet 1     LORazepam (ATIVAN) 0.5 MG tablet 1 po  qd prn anxiety 30 tablet 0     order for DME Equipment being ordered: [employer] DREAM STATION WITH  AND MIRAGE FX FOR HER NASAL MASK WITH CHINSTRAP.  PRESSURE 7-15 CM.       vitamin D2 (ERGOCALCIFEROL) 83755 units (1250 mcg) capsule Take 1 capsule (50,000 Units) by mouth once a week 12 capsule 1     albuterol (PROAIR HFA/PROVENTIL HFA/VENTOLIN HFA) 108 (90 Base) MCG/ACT inhaler Inhale 2 puffs into the lungs every 6 hours (Patient not taking: Reported on 8/5/2020) 1 Inhaler 0  "      Allergies:  Allergies   Allergen Reactions     No Known Drug Allergies        Family history:  Family History   Problem Relation Age of Onset     Gallbladder Disease Mother      Other - See Comments Mother         varicose veins      Hypertension Father      Rectal Cancer Maternal Grandfather      Cerebrovascular Disease Maternal Grandmother      Heart Disease Paternal Grandfather        Social history:  Social History     Tobacco Use     Smoking status: Current Every Day Smoker     Packs/day: 0.50     Years: 4.00     Pack years: 2.00     Types: Cigarettes     Smokeless tobacco: Never Used     Tobacco comment: socially   Substance Use Topics     Alcohol use: Yes     Alcohol/week: 0.0 standard drinks     Comment: occasional    Marital status: .  Occupation: works at [employer].    Nursing Notes:   Barbara Cardoso CMA  4/15/2021  9:05 AM  Signed  Chief Complaint   Patient presents with     New Patient     New consult for rectal bleeding       Vitals:    04/15/21 0846   BP: (!) 128/91   BP Location: Left arm   Patient Position: Sitting   Cuff Size: Adult Regular   Pulse: 97   Temp: 98.3  F (36.8  C)   TempSrc: Oral   SpO2: 100%   Weight: 163 lb 9.6 oz   Height: 5' 7\"       Body mass index is 25.62 kg/m .          Barbara Lozano CMA         30 minutes spent on the date of the encounter doing chart review, history and exam, documentation and further activities as noted above.     BLANCA Fregoso, NP-C  Colon and Rectal Surgery   [facility]    This note was created using speech recognition software and may contain unintended word substitutions.    "

## 2021-04-15 NOTE — NURSING NOTE
"Chief Complaint   Patient presents with     New Patient     New consult for rectal bleeding       Vitals:    04/15/21 0846   BP: (!) 128/91   BP Location: Left arm   Patient Position: Sitting   Cuff Size: Adult Regular   Pulse: 97   Temp: 98.3  F (36.8  C)   TempSrc: Oral   SpO2: 100%   Weight: 163 lb 9.6 oz   Height: 5' 7\"       Body mass index is 25.62 kg/m .          Barbara Lozano CMA    "

## 2021-04-16 DIAGNOSIS — K62.5 RECTAL BLEEDING: Primary | ICD-10-CM

## 2021-05-09 DIAGNOSIS — I10 HTN, GOAL BELOW 140/90: ICD-10-CM

## 2021-05-09 DIAGNOSIS — R73.9 BLOOD SUGAR INCREASED: ICD-10-CM

## 2021-05-09 DIAGNOSIS — E55.9 VITAMIN D DEFICIENCY: ICD-10-CM

## 2021-05-09 DIAGNOSIS — E66.3 OVERWEIGHT: ICD-10-CM

## 2021-05-10 DIAGNOSIS — F41.1 GENERALIZED ANXIETY DISORDER: ICD-10-CM

## 2021-05-10 RX ORDER — ATENOLOL 25 MG/1
TABLET ORAL
Qty: 90 TABLET | Refills: 0 | Status: SHIPPED | OUTPATIENT
Start: 2021-05-10 | End: 2021-08-11

## 2021-05-10 RX ORDER — LORAZEPAM 0.5 MG/1
TABLET ORAL
Qty: 30 TABLET | Refills: 0 | Status: SHIPPED | OUTPATIENT
Start: 2021-05-10 | End: 2021-06-22

## 2021-06-21 DIAGNOSIS — F41.1 GENERALIZED ANXIETY DISORDER: ICD-10-CM

## 2021-06-22 RX ORDER — LORAZEPAM 0.5 MG/1
TABLET ORAL
Qty: 30 TABLET | Refills: 0 | Status: SHIPPED | OUTPATIENT
Start: 2021-06-22 | End: 2021-08-03

## 2021-08-03 ENCOUNTER — MYC MEDICAL ADVICE (OUTPATIENT)
Dept: INTERNAL MEDICINE | Facility: CLINIC | Age: 41
End: 2021-08-03

## 2021-08-03 DIAGNOSIS — F41.1 GENERALIZED ANXIETY DISORDER: ICD-10-CM

## 2021-08-03 RX ORDER — LORAZEPAM 0.5 MG/1
TABLET ORAL
Qty: 30 TABLET | Refills: 0 | Status: SHIPPED | OUTPATIENT
Start: 2021-08-03 | End: 2021-10-12

## 2021-08-03 NOTE — TELEPHONE ENCOUNTER
Patient requesting refill for Lorazepam.     Routing refill request to provider for review/approval because:  Drug not on the [facility] refill protocol

## 2021-08-11 DIAGNOSIS — E66.3 OVERWEIGHT: ICD-10-CM

## 2021-08-11 DIAGNOSIS — E55.9 VITAMIN D DEFICIENCY: ICD-10-CM

## 2021-08-11 DIAGNOSIS — I10 HTN, GOAL BELOW 140/90: ICD-10-CM

## 2021-08-11 DIAGNOSIS — R73.9 BLOOD SUGAR INCREASED: ICD-10-CM

## 2021-08-12 RX ORDER — ATENOLOL 25 MG/1
25 TABLET ORAL DAILY
Qty: 90 TABLET | Refills: 0 | Status: SHIPPED | OUTPATIENT
Start: 2021-08-12 | End: 2021-10-12

## 2021-08-19 ENCOUNTER — PATIENT OUTREACH (OUTPATIENT)
Dept: INTERNAL MEDICINE | Facility: CLINIC | Age: 41
End: 2021-08-19

## 2021-08-19 DIAGNOSIS — B97.7 HIGH RISK HPV INFECTION: ICD-10-CM

## 2021-09-02 ENCOUNTER — TRANSFERRED RECORDS (OUTPATIENT)
Dept: HEALTH INFORMATION MANAGEMENT | Facility: CLINIC | Age: 41
End: 2021-09-02

## 2021-09-05 ENCOUNTER — HEALTH MAINTENANCE LETTER (OUTPATIENT)
Age: 41
End: 2021-09-05

## 2021-09-17 NOTE — TELEPHONE ENCOUNTER
FYI to provider - Patient is lost to pap tracking follow-up. Attempts to contact pt have been made per reminder process and there has been no reply and/or no appt scheduled.       3/20/15 Pap NIL with Pos HR HPR but not HPV 16 or 18. Plan: cotest in 1 year.   10/17/16 DX NL pap, +HPV HR, pt. Is to schedule a colp.  1/9/17 colp done. No bx taken. Plan: pap in 6-12 months.   6/13/18 NIL, +HR HPV, not 16/18. Plan colp  11/2/18 [place]- No visible lesions, no Bx taken. Plan pap in 1 year (from last pap), due by 6/13/19 07/18/19 Patient is lost to pap tracking follow-up.   7/3/20 ASCUS, +HR HPV (not 16/18). Plan: colposcopy  7/14/20 pt saw results/recommendation (lce)  8/5/20 [place] ECC: benign. Plan 1 year cotest  7/20/21 Reminder letter  8/19/21 Reminder call - LM  9/17/21 FYI to provider - Patient is lost to pap tracking follow-up. Attempts to contact pt have been made per reminder process and there has been no reply and/or no appt scheduled.

## 2021-10-08 ENCOUNTER — HOSPITAL ENCOUNTER (OUTPATIENT)
Facility: CLINIC | Age: 41
Setting detail: OBSERVATION
Discharge: HOME | End: 2021-10-09
Attending: EMERGENCY MEDICINE | Admitting: INTERNAL MEDICINE
Payer: COMMERCIAL

## 2021-10-08 ENCOUNTER — APPOINTMENT (OUTPATIENT)
Dept: CT IMAGING | Facility: CLINIC | Age: 41
End: 2021-10-08
Attending: EMERGENCY MEDICINE
Payer: COMMERCIAL

## 2021-10-08 DIAGNOSIS — K29.20 ALCOHOLIC GASTRITIS WITHOUT BLEEDING, UNSPECIFIED CHRONICITY: Primary | ICD-10-CM

## 2021-10-08 DIAGNOSIS — K75.81 STEATOHEPATITIS: ICD-10-CM

## 2021-10-08 DIAGNOSIS — R11.10 INTRACTABLE VOMITING, PRESENCE OF NAUSEA NOT SPECIFIED, UNSPECIFIED VOMITING TYPE: ICD-10-CM

## 2021-10-08 DIAGNOSIS — R10.11 RUQ ABDOMINAL PAIN: ICD-10-CM

## 2021-10-08 DIAGNOSIS — E87.6 HYPOKALEMIA: ICD-10-CM

## 2021-10-08 DIAGNOSIS — R74.01 TRANSAMINITIS: ICD-10-CM

## 2021-10-08 LAB
ALBUMIN SERPL-MCNC: 3.6 G/DL (ref 3.4–5)
ALBUMIN UR-MCNC: 70 MG/DL
ALP SERPL-CCNC: 131 U/L (ref 40–150)
ALT SERPL W P-5'-P-CCNC: 235 U/L (ref 0–50)
ANION GAP SERPL CALCULATED.3IONS-SCNC: 12 MMOL/L (ref 3–14)
APAP SERPL-MCNC: <2 MG/L (ref 10–30)
APPEARANCE UR: ABNORMAL
AST SERPL W P-5'-P-CCNC: 269 U/L (ref 0–45)
BACTERIA #/AREA URNS HPF: ABNORMAL /HPF
BASOPHILS # BLD AUTO: 0.1 10E3/UL (ref 0–0.2)
BASOPHILS NFR BLD AUTO: 1 %
BILIRUB SERPL-MCNC: 0.5 MG/DL (ref 0.2–1.3)
BILIRUB UR QL STRIP: NEGATIVE
BUN SERPL-MCNC: 6 MG/DL (ref 7–30)
CALCIUM SERPL-MCNC: 9 MG/DL (ref 8.5–10.1)
CHLORIDE BLD-SCNC: 97 MMOL/L (ref 94–109)
CO2 SERPL-SCNC: 27 MMOL/L (ref 20–32)
COLOR UR AUTO: YELLOW
CREAT SERPL-MCNC: 0.76 MG/DL (ref 0.52–1.04)
EOSINOPHIL # BLD AUTO: 0.1 10E3/UL (ref 0–0.7)
EOSINOPHIL NFR BLD AUTO: 1 %
ERYTHROCYTE [DISTWIDTH] IN BLOOD BY AUTOMATED COUNT: 17.2 % (ref 10–15)
GFR SERPL CREATININE-BSD FRML MDRD: >90 ML/MIN/1.73M2
GLUCOSE BLD-MCNC: 95 MG/DL (ref 70–99)
GLUCOSE UR STRIP-MCNC: NEGATIVE MG/DL
HCG UR QL: NEGATIVE
HCT VFR BLD AUTO: 36.5 % (ref 35–47)
HGB BLD-MCNC: 12.3 G/DL (ref 11.7–15.7)
HGB UR QL STRIP: NEGATIVE
HOLD SPECIMEN: NORMAL
IMM GRANULOCYTES # BLD: 0 10E3/UL
IMM GRANULOCYTES NFR BLD: 0 %
KETONES UR STRIP-MCNC: ABNORMAL MG/DL
LEUKOCYTE ESTERASE UR QL STRIP: NEGATIVE
LIPASE SERPL-CCNC: 92 U/L (ref 73–393)
LYMPHOCYTES # BLD AUTO: 3.2 10E3/UL (ref 0.8–5.3)
LYMPHOCYTES NFR BLD AUTO: 53 %
MCH RBC QN AUTO: 34.7 PG (ref 26.5–33)
MCHC RBC AUTO-ENTMCNC: 33.7 G/DL (ref 31.5–36.5)
MCV RBC AUTO: 103 FL (ref 78–100)
MONOCYTES # BLD AUTO: 0.4 10E3/UL (ref 0–1.3)
MONOCYTES NFR BLD AUTO: 7 %
MUCOUS THREADS #/AREA URNS LPF: PRESENT /LPF
NEUTROPHILS # BLD AUTO: 2.3 10E3/UL (ref 1.6–8.3)
NEUTROPHILS NFR BLD AUTO: 38 %
NITRATE UR QL: NEGATIVE
NRBC # BLD AUTO: 0 10E3/UL
NRBC BLD AUTO-RTO: 0 /100
PH UR STRIP: 5.5 [PH] (ref 5–7)
PLATELET # BLD AUTO: 222 10E3/UL (ref 150–450)
POTASSIUM BLD-SCNC: 3.3 MMOL/L (ref 3.4–5.3)
PROT SERPL-MCNC: 7.9 G/DL (ref 6.8–8.8)
RBC # BLD AUTO: 3.54 10E6/UL (ref 3.8–5.2)
RBC URINE: 0 /HPF
SODIUM SERPL-SCNC: 136 MMOL/L (ref 133–144)
SP GR UR STRIP: 1.03 (ref 1–1.03)
SQUAMOUS EPITHELIAL: 15 /HPF
UROBILINOGEN UR STRIP-MCNC: 2 MG/DL
WBC # BLD AUTO: 6 10E3/UL (ref 4–11)
WBC URINE: 15 /HPF

## 2021-10-08 PROCEDURE — 85025 COMPLETE CBC W/AUTO DIFF WBC: CPT | Performed by: EMERGENCY MEDICINE

## 2021-10-08 PROCEDURE — G0378 HOSPITAL OBSERVATION PER HR: HCPCS

## 2021-10-08 PROCEDURE — C9803 HOPD COVID-19 SPEC COLLECT: HCPCS

## 2021-10-08 PROCEDURE — 96375 TX/PRO/DX INJ NEW DRUG ADDON: CPT

## 2021-10-08 PROCEDURE — 99285 EMERGENCY DEPT VISIT HI MDM: CPT | Mod: 25

## 2021-10-08 PROCEDURE — 250N000013 HC RX MED GY IP 250 OP 250 PS 637: Performed by: EMERGENCY MEDICINE

## 2021-10-08 PROCEDURE — 74177 CT ABD & PELVIS W/CONTRAST: CPT

## 2021-10-08 PROCEDURE — 96361 HYDRATE IV INFUSION ADD-ON: CPT

## 2021-10-08 PROCEDURE — 99220 PR INITIAL OBSERVATION CARE,LEVEL III: CPT | Performed by: INTERNAL MEDICINE

## 2021-10-08 PROCEDURE — 36415 COLL VENOUS BLD VENIPUNCTURE: CPT | Performed by: EMERGENCY MEDICINE

## 2021-10-08 PROCEDURE — 258N000003 HC RX IP 258 OP 636: Performed by: INTERNAL MEDICINE

## 2021-10-08 PROCEDURE — 250N000009 HC RX 250: Performed by: INTERNAL MEDICINE

## 2021-10-08 PROCEDURE — 250N000011 HC RX IP 250 OP 636: Performed by: EMERGENCY MEDICINE

## 2021-10-08 PROCEDURE — 258N000003 HC RX IP 258 OP 636: Performed by: EMERGENCY MEDICINE

## 2021-10-08 PROCEDURE — 81001 URINALYSIS AUTO W/SCOPE: CPT | Performed by: EMERGENCY MEDICINE

## 2021-10-08 PROCEDURE — 80143 DRUG ASSAY ACETAMINOPHEN: CPT | Performed by: EMERGENCY MEDICINE

## 2021-10-08 PROCEDURE — U0003 INFECTIOUS AGENT DETECTION BY NUCLEIC ACID (DNA OR RNA); SEVERE ACUTE RESPIRATORY SYNDROME CORONAVIRUS 2 (SARS-COV-2) (CORONAVIRUS DISEASE [COVID-19]), AMPLIFIED PROBE TECHNIQUE, MAKING USE OF HIGH THROUGHPUT TECHNOLOGIES AS DESCRIBED BY CMS-2020-01-R: HCPCS | Performed by: EMERGENCY MEDICINE

## 2021-10-08 PROCEDURE — 87086 URINE CULTURE/COLONY COUNT: CPT | Performed by: EMERGENCY MEDICINE

## 2021-10-08 PROCEDURE — 250N000011 HC RX IP 250 OP 636: Performed by: INTERNAL MEDICINE

## 2021-10-08 PROCEDURE — 83690 ASSAY OF LIPASE: CPT | Performed by: EMERGENCY MEDICINE

## 2021-10-08 PROCEDURE — 80053 COMPREHEN METABOLIC PANEL: CPT | Performed by: EMERGENCY MEDICINE

## 2021-10-08 PROCEDURE — 81025 URINE PREGNANCY TEST: CPT | Performed by: EMERGENCY MEDICINE

## 2021-10-08 PROCEDURE — 250N000009 HC RX 250: Performed by: EMERGENCY MEDICINE

## 2021-10-08 PROCEDURE — 96376 TX/PRO/DX INJ SAME DRUG ADON: CPT

## 2021-10-08 PROCEDURE — 96366 THER/PROPH/DIAG IV INF ADDON: CPT

## 2021-10-08 PROCEDURE — 96365 THER/PROPH/DIAG IV INF INIT: CPT | Mod: 59

## 2021-10-08 PROCEDURE — 93005 ELECTROCARDIOGRAM TRACING: CPT

## 2021-10-08 RX ORDER — LANOLIN ALCOHOL/MO/W.PET/CERES
3 CREAM (GRAM) TOPICAL
Status: DISCONTINUED | OUTPATIENT
Start: 2021-10-08 | End: 2021-10-09 | Stop reason: HOSPADM

## 2021-10-08 RX ORDER — AMOXICILLIN 250 MG
1 CAPSULE ORAL 2 TIMES DAILY PRN
Status: DISCONTINUED | OUTPATIENT
Start: 2021-10-08 | End: 2021-10-09 | Stop reason: HOSPADM

## 2021-10-08 RX ORDER — ACETAMINOPHEN 500 MG
1000 TABLET ORAL ONCE
Status: COMPLETED | OUTPATIENT
Start: 2021-10-08 | End: 2021-10-08

## 2021-10-08 RX ORDER — ACETAMINOPHEN 325 MG/1
650 TABLET ORAL EVERY 6 HOURS PRN
Status: DISCONTINUED | OUTPATIENT
Start: 2021-10-08 | End: 2021-10-09 | Stop reason: HOSPADM

## 2021-10-08 RX ORDER — SODIUM CHLORIDE 9 MG/ML
INJECTION, SOLUTION INTRAVENOUS CONTINUOUS
Status: ACTIVE | OUTPATIENT
Start: 2021-10-08 | End: 2021-10-09

## 2021-10-08 RX ORDER — PROCHLORPERAZINE MALEATE 5 MG
5 TABLET ORAL EVERY 6 HOURS PRN
Status: DISCONTINUED | OUTPATIENT
Start: 2021-10-08 | End: 2021-10-08

## 2021-10-08 RX ORDER — PROCHLORPERAZINE 25 MG
25 SUPPOSITORY, RECTAL RECTAL EVERY 12 HOURS PRN
Status: DISCONTINUED | OUTPATIENT
Start: 2021-10-08 | End: 2021-10-08

## 2021-10-08 RX ORDER — ONDANSETRON 2 MG/ML
4 INJECTION INTRAMUSCULAR; INTRAVENOUS ONCE
Status: COMPLETED | OUTPATIENT
Start: 2021-10-08 | End: 2021-10-08

## 2021-10-08 RX ORDER — LORAZEPAM 2 MG/ML
0.5 INJECTION INTRAMUSCULAR EVERY 6 HOURS PRN
Status: DISCONTINUED | OUTPATIENT
Start: 2021-10-08 | End: 2021-10-09 | Stop reason: HOSPADM

## 2021-10-08 RX ORDER — KETOROLAC TROMETHAMINE 15 MG/ML
15 INJECTION, SOLUTION INTRAMUSCULAR; INTRAVENOUS ONCE
Status: COMPLETED | OUTPATIENT
Start: 2021-10-08 | End: 2021-10-08

## 2021-10-08 RX ORDER — MORPHINE SULFATE 4 MG/ML
4 INJECTION, SOLUTION INTRAMUSCULAR; INTRAVENOUS ONCE
Status: COMPLETED | OUTPATIENT
Start: 2021-10-08 | End: 2021-10-08

## 2021-10-08 RX ORDER — ONDANSETRON 2 MG/ML
4 INJECTION INTRAMUSCULAR; INTRAVENOUS EVERY 6 HOURS PRN
Status: DISCONTINUED | OUTPATIENT
Start: 2021-10-08 | End: 2021-10-09 | Stop reason: HOSPADM

## 2021-10-08 RX ORDER — PROCHLORPERAZINE MALEATE 5 MG
10 TABLET ORAL EVERY 6 HOURS PRN
Status: DISCONTINUED | OUTPATIENT
Start: 2021-10-08 | End: 2021-10-09 | Stop reason: HOSPADM

## 2021-10-08 RX ORDER — AMOXICILLIN 250 MG
2 CAPSULE ORAL 2 TIMES DAILY PRN
Status: DISCONTINUED | OUTPATIENT
Start: 2021-10-08 | End: 2021-10-09 | Stop reason: HOSPADM

## 2021-10-08 RX ORDER — POTASSIUM CHLORIDE 7.45 MG/ML
10 INJECTION INTRAVENOUS ONCE
Status: COMPLETED | OUTPATIENT
Start: 2021-10-08 | End: 2021-10-08

## 2021-10-08 RX ORDER — ACETAMINOPHEN 650 MG/1
650 SUPPOSITORY RECTAL EVERY 6 HOURS PRN
Status: DISCONTINUED | OUTPATIENT
Start: 2021-10-08 | End: 2021-10-09 | Stop reason: HOSPADM

## 2021-10-08 RX ORDER — PROCHLORPERAZINE 25 MG
25 SUPPOSITORY, RECTAL RECTAL EVERY 12 HOURS PRN
Status: DISCONTINUED | OUTPATIENT
Start: 2021-10-08 | End: 2021-10-09 | Stop reason: HOSPADM

## 2021-10-08 RX ORDER — METOCLOPRAMIDE HYDROCHLORIDE 5 MG/ML
10 INJECTION INTRAMUSCULAR; INTRAVENOUS ONCE
Status: COMPLETED | OUTPATIENT
Start: 2021-10-08 | End: 2021-10-08

## 2021-10-08 RX ORDER — POTASSIUM CHLORIDE 1.5 G/1.58G
20 POWDER, FOR SOLUTION ORAL ONCE
Status: COMPLETED | OUTPATIENT
Start: 2021-10-08 | End: 2021-10-08

## 2021-10-08 RX ORDER — IOPAMIDOL 755 MG/ML
500 INJECTION, SOLUTION INTRAVASCULAR ONCE
Status: COMPLETED | OUTPATIENT
Start: 2021-10-08 | End: 2021-10-08

## 2021-10-08 RX ORDER — ONDANSETRON 4 MG/1
4 TABLET, ORALLY DISINTEGRATING ORAL EVERY 6 HOURS PRN
Status: DISCONTINUED | OUTPATIENT
Start: 2021-10-08 | End: 2021-10-09 | Stop reason: HOSPADM

## 2021-10-08 RX ADMIN — SODIUM CHLORIDE: 9 INJECTION, SOLUTION INTRAVENOUS at 22:05

## 2021-10-08 RX ADMIN — FAMOTIDINE 20 MG: 10 INJECTION, SOLUTION INTRAVENOUS at 22:43

## 2021-10-08 RX ADMIN — METOCLOPRAMIDE HYDROCHLORIDE 10 MG: 5 INJECTION INTRAMUSCULAR; INTRAVENOUS at 19:40

## 2021-10-08 RX ADMIN — POTASSIUM CHLORIDE 10 MEQ: 7.46 INJECTION, SOLUTION INTRAVENOUS at 19:50

## 2021-10-08 RX ADMIN — MORPHINE SULFATE 4 MG: 4 INJECTION INTRAVENOUS at 16:10

## 2021-10-08 RX ADMIN — IOPAMIDOL 82 ML: 755 INJECTION, SOLUTION INTRAVENOUS at 17:59

## 2021-10-08 RX ADMIN — ONDANSETRON 4 MG: 2 INJECTION INTRAMUSCULAR; INTRAVENOUS at 16:11

## 2021-10-08 RX ADMIN — KETOROLAC TROMETHAMINE 15 MG: 15 INJECTION, SOLUTION INTRAMUSCULAR; INTRAVENOUS at 17:26

## 2021-10-08 RX ADMIN — SODIUM CHLORIDE 1000 ML: 9 INJECTION, SOLUTION INTRAVENOUS at 16:08

## 2021-10-08 RX ADMIN — PROMETHAZINE HYDROCHLORIDE 25 MG: 25 INJECTION INTRAMUSCULAR; INTRAVENOUS at 21:59

## 2021-10-08 RX ADMIN — SODIUM CHLORIDE 60 ML: 9 INJECTION, SOLUTION INTRAVENOUS at 17:59

## 2021-10-08 RX ADMIN — POTASSIUM CHLORIDE 20 MEQ: 1.5 POWDER, FOR SOLUTION ORAL at 17:27

## 2021-10-08 RX ADMIN — MORPHINE SULFATE 4 MG: 4 INJECTION INTRAVENOUS at 19:45

## 2021-10-08 RX ADMIN — LORAZEPAM 0.5 MG: 2 INJECTION INTRAMUSCULAR; INTRAVENOUS at 22:43

## 2021-10-08 RX ADMIN — ACETAMINOPHEN 1000 MG: 500 TABLET, FILM COATED ORAL at 16:09

## 2021-10-08 ASSESSMENT — ENCOUNTER SYMPTOMS
DIARRHEA: 0
DYSURIA: 0
ABDOMINAL PAIN: 1
VOMITING: 1
FEVER: 0
HEMATURIA: 0
CONSTIPATION: 0
LIGHT-HEADEDNESS: 1
COUGH: 0
NAUSEA: 1
DIZZINESS: 1

## 2021-10-08 ASSESSMENT — MIFFLIN-ST. JEOR: SCORE: 1405.71

## 2021-10-08 NOTE — ED PROVIDER NOTES
History     Chief Complaint:  Dizziness    The history is provided by the patient.      Tati Hamilton is a 40 year old female smoker with a history of hypertension who presents with dizziness. Sharee has had non-radiating, throbbing, right upper quadrant abdominal pain for about the last 2 weeks. It has been tolerable and she has been using Tylenol for her pain, estimating about 6 325 mg tablets per day most days in the last couple of weeks. Today her pain increased to 7/10 and prompted her visit. She has also had vomiting occasionally but it has been more pronounced today. She denies diarrhea or constipation. She denies dysuria or hematuria. She has a history of kidney stones but remarks this pain does not feel like her previous kidney stones. She has had prior cholecystectomy. Sharee has had lightheadedness and dizziness today but wonders if it may be related to anxiety. She denies fever or cough and is vaccinated against COVID. She denies recent travel or surgery. She does not use OCPs. She drinks about 8 alcoholic beverages per week.     Review of Systems   Constitutional: Negative for fever.   Respiratory: Negative for cough.    Gastrointestinal: Positive for abdominal pain (right), nausea and vomiting. Negative for constipation and diarrhea.   Genitourinary: Negative for dysuria and hematuria.   Neurological: Positive for dizziness and light-headedness.   All other systems reviewed and are negative.    Allergies:  The patient has no known allergies.    Medications:    Albuterol inhaler  Atenolol  Hydroxyzine  Lisinopril  Lorazepam    Past Medical History:    Anxiety  Calculus of kidney  Depression   High risk HPV infection  Hypertension  Lung nodules  Multiple sclerosis  CHARLES     Past Surgical History:    Tonsillectomy  Cholecystectomy  Lithotripsy x2    Family History:    Gallbladder disease - mother  Varicose veins - mother  Hypertension - father    Social History:  Presents alone.  Current everyday  smoker     Physical Exam     Patient Vitals for the past 24 hrs:   BP Temp Temp src Pulse Resp SpO2 Height Weight   10/08/21 2100 (!) 133/93 -- -- 93 -- 98 % -- --   10/08/21 1745 -- -- -- 87 -- 97 % -- --   10/08/21 1730 -- -- -- 73 -- 98 % -- --   10/08/21 1700 -- -- -- -- -- 98 % -- --   10/08/21 1600 (!) 130/102 -- -- 90 -- 99 % -- --   10/08/21 1500 (!) 121/91 -- -- 65 -- 99 % -- --   10/08/21 1443 -- -- -- -- -- -- -- 74.3 kg (163 lb 12.8 oz)   10/08/21 1412 (!) 128/94 98  F (36.7  C) -- 87 18 100 % -- --       Physical Exam  General: Well-developed and well-nourished. Well appearing middle aged  woman holding emesis bag with vomitus. Cooperative.  Head:  Atraumatic.  Eyes:  Conjunctivae, lids, and sclerae are normal.  Neck:  Supple. Normal range of motion.  CV:  Regular rate and rhythm. Normal heart sounds with no murmurs, rubs, or gallops detected.  Resp:  No respiratory distress. Clear to auscultation bilaterally without decreased breath sounds, wheezing, rales, or rhonchi.  GI:  Soft. Non-distended. Right sided abdominal tenderness. No CVA tenderness.    MS:  Normal ROM. No bilateral lower extremity edema.  Skin:  Warm. Non-diaphoretic. No pallor.  Neuro:  Awake. A&Ox3. Normal strength.  Psych: Normal mood and affect. Normal speech.  Vitals reviewed.    Emergency Department Course   EKG  Time: 1420  Rate 77 bpm. RI interval 136. QRS duration 86. QT/QTc 108/461.   Sinus rhythm  Normal ECG  No acute ST changes.  Ventricular bigeminy is resolved as compared to prior, dated 10/28/17.    Imaging:  CT Abdomen Pelvis w Contrast   Final Result   IMPRESSION:    1.  Severe hepatic steatosis, question steatohepatitis.   2.  No bowel obstruction.          Laboratory:  Abnormal values below only. See results review for all others.   Labs Ordered and Resulted from Time of ED Arrival Up to the Time of Departure from the ED   COMPREHENSIVE METABOLIC PANEL - Abnormal; Notable for the following components:        Result Value    Potassium 3.3 (*)     Urea Nitrogen 6 (*)      (*)      (*)     All other components within normal limits   ROUTINE UA WITH MICROSCOPIC REFLEX TO CULTURE - Abnormal; Notable for the following components:    Appearance Urine Cloudy (*)     Ketones Urine Trace (*)     Protein Albumin Urine 70  (*)     Bacteria Urine Many (*)     Mucus Urine Present (*)     WBC Urine 15 (*)     Squamous Epithelials Urine 15 (*)     All other components within normal limits    Narrative:     Urine Culture ordered based on laboratory criteria   CBC WITH PLATELETS AND DIFFERENTIAL - Abnormal; Notable for the following components:    RBC Count 3.54 (*)      (*)     MCH 34.7 (*)     RDW 17.2 (*)     All other components within normal limits   ACETAMINOPHEN LEVEL - Abnormal; Notable for the following components:    Acetaminophen <2 (*)     All other components within normal limits   LIPASE - Normal   HCG QUALITATIVE URINE - Normal   COVID-19 VIRUS (CORONAVIRUS) BY PCR   URINE CULTURE     Emergency Department Course:    Reviewed:  I reviewed nursing notes, vitals, and past history.    Assessments:  1530 I obtained history and examined the patient as noted above.     1859 I rechecked the patient and explained findings. She has been vomiting just before I entered the room. She continues to have pain.     2100 Sharee continues to vomit with six episodes in the last 2 hours.  I am uncomfortable discharging her at this point and she is now comfortable with admission.    Consults:   1920 I spoke with Dr. Thompson, hospitalist, regarding admission.  1929 I spoke with Dr. Thompson. The patient is requesting discharge.  2115 I spoke with Dr. Thompson who accepts admission.     Interventions:  1608 1L NS bolus IV  1609 Tylenol 1g PO  1610 Morphine 4 mg IV  1611 Zofran 4mg IV  1726 Toradol 15 mg IV  1727 KCl 20 mEq PO  1940 Reglan 10 mg IV  1945 Morphine 4 mg IV  1950 KCl 10 mEq IV  2159 Phenergan 25 mg IV    Disposition:  The  patient was admitted to the hospital under the care of Dr. Thompson.    Impression & Plan    Medical Decision Making:  Sharee is a 40-year-old woman who has had right upper quadrant abdominal pain for the last couple of weeks.  She has had some intermittent vomiting but both her pain and vomiting worsened today which prompted her visit.  She also has some lightheadedness but admits this may be related to anxiety.  She appears well on exam but does have right upper quadrant tenderness.  Differential is broad so she was sent for CT of the abdomen and pelvis.  This reveals severe hepatic steatosis with questionable steatohepatitis.  This would explain her pain and vomiting..  Today her AST is elevated at 269 with ALT of 235.  These values were 18 and 39 respectively in February 2021.  There is no evidence of pancreatitis.  She has had prior cholecystectomy.  She has been using Tylenol but acetaminophen level is undetectable.  She endorses the use of about 8 alcoholic beverages per week.    Her lightheadedness is probably related to anxiety as she suspected.  Her EKG is reassuring without acute ST changes or arrhythmias.  There is no anemia (macrocytosis is noted) or leukocytosis.  There is no kidney injury with only mild hypokalemia.  This was initially repleted with oral potassium but Sharee had persistent vomiting in the emergency department so I also gave her IV potassium.  Urinalysis does reveal many bacteria and 15 white blood cells per high-powered field.  However, there are also 15 squamous epithelial cells per high-powered field and this is unlikely to represent infection.  Urine culture is pending but I will not give her empiric antibiotics.    As above, Sharee had persistent vomiting after Zofran.  On repeat assessment she also has persistent tenderness despite Tylenol, Toradol, and morphine.  Given ongoing symptoms with findings of hepatitis, I recommended admission and she initially accepted.  She was then given  Reglan and an additional dose of morphine.  I spoke with Dr. Thompson who evaluated Sharee and Sharee expressed that she would like to be discharged.  Unfortunately, she had at least 6 additional episodes of emesis over the next couple of hours such that she cannot be discharged safely.  She was then agreeable to admission after we discussed findings and plan. I answered all her questions. She was given Phenergan and accepted by Dr. Thompson who had no further orders. On my final assessment she seemed mildly tremulous and has increased heart rate.  I am somewhat concerned she may be underreporting her alcohol intake which could be contributing to her hepatitis (and macrocytosis) although ratio of AST to ALT is not what you would expect.      Covid-19  Tati Hamilton was evaluated during a global COVID-19 pandemic, which necessitated consideration that the patient might be at risk for infection with the SARS-CoV-2 virus that causes COVID-19.   Applicable protocols for evaluation were followed during the patient's care.   COVID-19 was considered as part of the patient's evaluation. The plan for testing is:  a test was obtained during this visit.    Diagnosis:    ICD-10-CM    1. Steatohepatitis  K75.81    2. Intractable vomiting, presence of nausea not specified, unspecified vomiting type  R11.10    3. RUQ abdominal pain  R10.11    4. Transaminitis  R74.01    5. Hypokalemia  E87.6      Scribe Disclosure:  I, Mavis Deacon, am serving as a scribe at 3:21 PM on 10/8/2021 to document services personally performed by Johanna Samuel MD based on my observations and the provider's statements to me.       Johanna Samuel MD  10/09/21 0049

## 2021-10-08 NOTE — ED TRIAGE NOTES
2 weeks of right sided abdominal pain. Today started vomiting. Denies blood in vomit.  Also feels like she has a racing heart since 10 AM and has since began feeling dizzy. Hx of low potassium.

## 2021-10-09 VITALS
TEMPERATURE: 97.5 F | BODY MASS INDEX: 24.33 KG/M2 | WEIGHT: 155 LBS | OXYGEN SATURATION: 95 % | DIASTOLIC BLOOD PRESSURE: 88 MMHG | SYSTOLIC BLOOD PRESSURE: 122 MMHG | HEART RATE: 70 BPM | RESPIRATION RATE: 16 BRPM | HEIGHT: 67 IN

## 2021-10-09 LAB
ALBUMIN SERPL-MCNC: 2.8 G/DL (ref 3.4–5)
ALP SERPL-CCNC: 95 U/L (ref 40–150)
ALT SERPL W P-5'-P-CCNC: 152 U/L (ref 0–50)
ANION GAP SERPL CALCULATED.3IONS-SCNC: 6 MMOL/L (ref 3–14)
AST SERPL W P-5'-P-CCNC: 126 U/L (ref 0–45)
ATRIAL RATE - MUSE: 77 BPM
BASOPHILS # BLD AUTO: 0 10E3/UL (ref 0–0.2)
BASOPHILS NFR BLD AUTO: 1 %
BILIRUB SERPL-MCNC: 1.2 MG/DL (ref 0.2–1.3)
BUN SERPL-MCNC: 9 MG/DL (ref 7–30)
CALCIUM SERPL-MCNC: 7.5 MG/DL (ref 8.5–10.1)
CHLORIDE BLD-SCNC: 102 MMOL/L (ref 94–109)
CO2 SERPL-SCNC: 29 MMOL/L (ref 20–32)
CREAT SERPL-MCNC: 0.9 MG/DL (ref 0.52–1.04)
DIASTOLIC BLOOD PRESSURE - MUSE: NORMAL MMHG
EOSINOPHIL # BLD AUTO: 0.1 10E3/UL (ref 0–0.7)
EOSINOPHIL NFR BLD AUTO: 2 %
ERYTHROCYTE [DISTWIDTH] IN BLOOD BY AUTOMATED COUNT: 17.1 % (ref 10–15)
GFR SERPL CREATININE-BSD FRML MDRD: 80 ML/MIN/1.73M2
GLUCOSE BLD-MCNC: 92 MG/DL (ref 70–99)
HCT VFR BLD AUTO: 31.4 % (ref 35–47)
HGB BLD-MCNC: 10.5 G/DL (ref 11.7–15.7)
IMM GRANULOCYTES # BLD: 0 10E3/UL
IMM GRANULOCYTES NFR BLD: 0 %
INTERPRETATION ECG - MUSE: NORMAL
LYMPHOCYTES # BLD AUTO: 2.8 10E3/UL (ref 0.8–5.3)
LYMPHOCYTES NFR BLD AUTO: 46 %
MCH RBC QN AUTO: 35 PG (ref 26.5–33)
MCHC RBC AUTO-ENTMCNC: 33.4 G/DL (ref 31.5–36.5)
MCV RBC AUTO: 105 FL (ref 78–100)
MONOCYTES # BLD AUTO: 0.5 10E3/UL (ref 0–1.3)
MONOCYTES NFR BLD AUTO: 8 %
MONOCYTES NFR BLD AUTO: NEGATIVE %
NEUTROPHILS # BLD AUTO: 2.6 10E3/UL (ref 1.6–8.3)
NEUTROPHILS NFR BLD AUTO: 43 %
NRBC # BLD AUTO: 0 10E3/UL
NRBC BLD AUTO-RTO: 0 /100
P AXIS - MUSE: 65 DEGREES
PLATELET # BLD AUTO: 159 10E3/UL (ref 150–450)
POTASSIUM BLD-SCNC: 3.5 MMOL/L (ref 3.4–5.3)
PR INTERVAL - MUSE: 136 MS
PROT SERPL-MCNC: 5.9 G/DL (ref 6.8–8.8)
QRS DURATION - MUSE: 86 MS
QT - MUSE: 408 MS
QTC - MUSE: 461 MS
R AXIS - MUSE: 56 DEGREES
RBC # BLD AUTO: 3 10E6/UL (ref 3.8–5.2)
SARS-COV-2 RNA RESP QL NAA+PROBE: NEGATIVE
SODIUM SERPL-SCNC: 137 MMOL/L (ref 133–144)
SYSTOLIC BLOOD PRESSURE - MUSE: NORMAL MMHG
T AXIS - MUSE: 45 DEGREES
VENTRICULAR RATE- MUSE: 77 BPM
WBC # BLD AUTO: 6.1 10E3/UL (ref 4–11)

## 2021-10-09 PROCEDURE — 96361 HYDRATE IV INFUSION ADD-ON: CPT

## 2021-10-09 PROCEDURE — G0378 HOSPITAL OBSERVATION PER HR: HCPCS

## 2021-10-09 PROCEDURE — 82947 ASSAY GLUCOSE BLOOD QUANT: CPT | Performed by: INTERNAL MEDICINE

## 2021-10-09 PROCEDURE — 86308 HETEROPHILE ANTIBODY SCREEN: CPT | Performed by: INTERNAL MEDICINE

## 2021-10-09 PROCEDURE — 83516 IMMUNOASSAY NONANTIBODY: CPT | Performed by: INTERNAL MEDICINE

## 2021-10-09 PROCEDURE — 85025 COMPLETE CBC W/AUTO DIFF WBC: CPT | Performed by: INTERNAL MEDICINE

## 2021-10-09 PROCEDURE — 99217 PR OBSERVATION CARE DISCHARGE: CPT | Performed by: INTERNAL MEDICINE

## 2021-10-09 PROCEDURE — 36415 COLL VENOUS BLD VENIPUNCTURE: CPT | Performed by: INTERNAL MEDICINE

## 2021-10-09 RX ORDER — FAMOTIDINE 20 MG/1
20 TABLET, FILM COATED ORAL 2 TIMES DAILY
Qty: 60 TABLET | Refills: 0 | Status: SHIPPED | OUTPATIENT
Start: 2021-10-09 | End: 2022-01-25

## 2021-10-09 RX ORDER — PROCHLORPERAZINE MALEATE 10 MG
10 TABLET ORAL EVERY 6 HOURS PRN
Qty: 15 TABLET | Refills: 0 | Status: SHIPPED | OUTPATIENT
Start: 2021-10-09 | End: 2022-01-25

## 2021-10-09 NOTE — PLAN OF CARE
RN [id number]  A&Ox4, VSS, RA, IV NS @ 100 ml/hr, clear liquid diet. Denies N/V. C/o RUQ pain, declined intervention beyond repositioning and bowel rest. Will continue to monitor.

## 2021-10-09 NOTE — PLAN OF CARE
Afebrile.  Mild tolerable pain, declined any PRN med.  Denies nausea.  LS clear bilaterally, RA.  CMS+.  Independent, ambulating.  Voiding.  Reviewed discharge instructions and medications with patient, denies q's.  Patient discharged to home with discharge instructions, medications (scripts for pepcid & compazine), and belongings.  Left floor ambulating indeptly, drove self home.

## 2021-10-09 NOTE — ED NOTES
Patient has continually been having emesis 200 ml to 350 ml since my arrival at 7 pm despite multiple nausea medications. Patient had requested to go home but due to continued emesis and inability to keep anything down admit is decided.

## 2021-10-09 NOTE — PLAN OF CARE
"PRIMARY DIAGNOSIS: Abdominal Pain   OUTPATIENT/OBSERVATION GOALS TO BE MET BEFORE DISCHARGE:  ADLs back to baseline: yes     Activity and level of assistance: Up with assist of 1 with walker and gait belt     Pain status: 5/10, RUQ abdominal, Ativan given with relief.     Return to near baseline physical activity: Yes     Arrived to room 626 from ER at 2230,alert and oriented x 4, oriented to room and call system, IV patent and infusing.   BP (!) 133/93   Pulse 93   Temp 97.5  F (36.4  C) (Temporal)   Resp 17   Ht 1.702 m (5' 7\")   Wt 70.3 kg (155 lb)   LMP 09/24/2021   SpO2 99%   BMI 24.28 kg/m      Discharge Planner Nurse      Safe discharge environment identified: No  Barriers to discharge: Yes          Please review provider order for any additional goals.   Nurse to notify provider when observation goals have been met and patient is ready for discharge.                   "

## 2021-10-09 NOTE — DISCHARGE SUMMARY
[facility]  Hospitalist Discharge Summary      Date of Admission:  10/8/2021  Date of Discharge:  10/9/2021  Discharging Provider: Srinivas Pittman MD      Discharge Diagnoses   Nausea and vomiting  Probable alcoholic gastritis  Right upper quadrant abdominal pain from steatohepatitis  Alcoholic liver disease  Hypertension.       Follow-ups Needed After Discharge   Follow-up Appointments     Follow-up and recommended labs and tests       Follow up with primary care provider, Gely Le,   within 7 days to follow up on results.  The following labs/tests are   recommended: bmp and following up with primary care physician.             Unresulted Labs Ordered in the Past 30 Days of this Admission     Date and Time Order Name Status Description    10/9/2021 12:30 AM Mitochondrial M2 Antibody IgG In process     10/9/2021 12:30 AM F Actin EIA with reflex In process     10/8/2021  5:20 PM Urine Culture In process       These results will be followed up by Gely Le      Discharge Disposition   Discharged to home  Condition at discharge: Stable       Hospital Course   Tati Hamilton is a 40 year old lady with history of hypertension, depression, anxiety, multiple sclerosis diagnosed 2001 not on any medication, nephrolithiasis, recent admission with nausea vomiting and elevated liver function tests which normalized after she stopped drinking 10/8/2021.  She drinks alcohol on the weekends.  Came into [facility] on 10/8/2021 with right upper quadrant abdominal pain, nausea vomiting without hematemesis.  Her hemoglobin at admission was 12.3 with decreased to 10.5 on IV fluids, AST was 269 at admission which decreased to 126 the next day and ALT was 225 which decreased to 152.  Scan of the abdomen showed severe hepatic steatosis with questionable steatohepatitis, no bowel obstruction was otherwise seen.   Her liver functions were improving.  Her nausea  was gone and she was able to eat and keep liquids down.  Patient wants to go home and is discharged home in a hemodynamically stable condition    Consultations This Hospital Stay   None    Code Status   Full Code    Time Spent on this Encounter   I, Srinivas Pittman MD, personally saw the patient today and spent less than or equal to 30 minutes discharging this patient.       Srinivas Pittman MD  [facility]  [address] [facility] [address]  Phone: [phone]  Fax: [phone]  ______________________________________________________________________    Physical Exam   Vital Signs: Temp: 97.5  F (36.4  C) Temp src: Temporal BP: 122/88 Pulse: 70   Resp: 16 SpO2: 95 % O2 Device: None (Room air)    Weight: 155 lbs 0 oz  Patient did not look in any acute distress  Heart S1-S2 regular rate and rhythm  Lungs are clear to auscultation without any crepitation or wheezing  Abdomen was soft without any tenderness  CNS patient was alert and oriented x3 moving all the 4 extremities       Primary Care Physician   Gely Le    Discharge Orders      Reason for your hospital stay    Came in with nausea and vomiting which improved     Follow-up and recommended labs and tests     Follow up with primary care provider, Gely Le, within 7 days to follow up on results.  The following labs/tests are recommended: bmp and following up with primary care physician.     Activity    Your activity upon discharge: activity as tolerated     Diet    Follow this diet upon discharge: Orders Placed This Encounter      Advance Diet as Tolerated: Clear Liquid Diet; Regular Diet Adult       Significant Results and Procedures   Most Recent 3 CBC's:Recent Labs   Lab Test 10/09/21  0630 10/08/21  1433 01/15/21  1056   WBC 6.1 6.0 6.5   HGB 10.5* 12.3 13.3   * 103* 111*    222 259     Most Recent 3 BMP's:Recent Labs   Lab Test 10/09/21  0630 10/08/21  1433 01/15/21  1056     136 134   POTASSIUM 3.5 3.3* 3.7   CHLORIDE 102 97 100   CO2 29 27 27   BUN 9 6* 6*   CR 0.90 0.76 0.65   ANIONGAP 6 12 7   MYRANDA 7.5* 9.0 9.0   GLC 92 95 90     Most Recent 2 LFT's:Recent Labs   Lab Test 10/09/21  0630 10/08/21  1433   * 269*   * 235*   ALKPHOS 95 131   BILITOTAL 1.2 0.5     Most Recent 3 INR's:No lab results found.,   Results for orders placed or performed during the hospital encounter of 10/08/21   CT Abdomen Pelvis w Contrast    Narrative    EXAM: CT ABDOMEN PELVIS W CONTRAST  LOCATION: [facility]  DATE/TIME: 10/8/2021 5:56 PM    INDICATION: Nausea/vomiting. Abdominal pain, acute, nonlocalized.  COMPARISON: 11/01/2017.  TECHNIQUE: CT scan of the abdomen and pelvis was performed following injection of IV contrast. Multiplanar reformats were obtained. Dose reduction techniques were used.  CONTRAST: 82 mL Isovue-370.    FINDINGS:   LOWER CHEST: No infiltrate or effusion. Small hiatal hernia.    HEPATOBILIARY: Severe hepatic steatosis. No focal lesions. Cholecystectomy. No biliary dilatation.    PANCREAS: No significant mass, duct dilatation, or inflammatory change.    SPLEEN: Normal size.    ADRENAL GLANDS: Stable benign adrenal adenoma on the right. Left adrenal gland unremarkable.    KIDNEYS/BLADDER: No significant mass, stone, or hydronephrosis.    BOWEL: No obstruction or inflammatory change.    LYMPH NODES: No lymphadenopathy.    VASCULATURE: No abdominal aortic aneurysm.    PELVIC ORGANS: No pelvic masses. Trace free fluid.    MUSCULOSKELETAL: No frankly destructive bony lesions.      Impression    IMPRESSION:   1.  Severe hepatic steatosis, question steatohepatitis.  2.  No bowel obstruction.       Discharge Medications   Current Discharge Medication List      START taking these medications    Details   famotidine (PEPCID) 20 MG tablet Take 1 tablet (20 mg) by mouth 2 times daily  Qty: 60 tablet, Refills: 0    Associated Diagnoses: Alcoholic  gastritis without bleeding, unspecified chronicity      prochlorperazine (COMPAZINE) 10 MG tablet Take 1 tablet (10 mg) by mouth every 6 hours as needed for nausea or vomiting  Qty: 15 tablet, Refills: 0    Associated Diagnoses: Alcoholic gastritis without bleeding, unspecified chronicity         CONTINUE these medications which have NOT CHANGED    Details   atenolol (TENORMIN) 25 MG tablet Take 1 tablet (25 mg) by mouth daily  Qty: 90 tablet, Refills: 0    Associated Diagnoses: HTN, goal below 140/90; Overweight; Vitamin D deficiency; Blood sugar increased      lisinopril (ZESTRIL) 20 MG tablet Take 1 tablet (20 mg) by mouth 2 times daily  Qty: 180 tablet, Refills: 1    Associated Diagnoses: HTN, goal below 140/90      LORazepam (ATIVAN) 0.5 MG tablet 1 po  qd prn anxiety  Qty: 30 tablet, Refills: 0    Associated Diagnoses: Generalized anxiety disorder      vitamin D2 (ERGOCALCIFEROL) 12765 units (1250 mcg) capsule Take 1 capsule (50,000 Units) by mouth once a week  Qty: 12 capsule, Refills: 1    Associated Diagnoses: Vitamin D deficiency; Overweight; HTN, goal below 140/90; Blood sugar increased      order for DME Equipment being ordered: [employer] DREAM STATION WITH HH AND MIRAGE FX FOR HER NASAL MASK WITH CHINSTRAP.  PRESSURE 7-15 CM.           Allergies   Allergies   Allergen Reactions     No Known Drug Allergies

## 2021-10-09 NOTE — PHARMACY-ADMISSION MEDICATION HISTORY
"Admission medication history interview status for this patient is coPATIENT CAREGIVER:[id number]::\"Patient\"}mplete. See [facility] admission navigator for allergy information, prior to admission medications and immunization status.     Medication history interview done, indicate source(s): -  Medication history resources (including written lists, pill bottles, clinic record):None  Pharmacy: -    Changes made to PTA medication list:  Added: -  Changed: -  Reported as Not Taking: Removed: proair, vistral     Actions taken by pharmacist (provider contacted, etc):None     Additional medication history information:None    Medication reconciliation/reorder completed by provider prior to medication history?  no   (Y/N)     For patients on insulin therapy:   Do you use sliding scale insulin based on blood sugars?   What is your pre-meal insulin coverage?    Do you typically eat three meals a day?   How many times do you check your blood glucose per day?   How many episodes of hypoglycemia do you typically have per month?   Do you have a Continuous Glucose Monitor (CGM)?      Prior to Admission medications    Medication Sig Last Dose Taking? Auth Provider   atenolol (TENORMIN) 25 MG tablet Take 1 tablet (25 mg) by mouth daily 10/8/2021 at Unknown time Yes Gely Le MD   lisinopril (ZESTRIL) 20 MG tablet Take 1 tablet (20 mg) by mouth 2 times daily 10/8/2021 at x1 Yes Gely eL MD   LORazepam (ATIVAN) 0.5 MG tablet 1 po  qd prn anxiety  Yes Gely Le MD   vitamin D2 (ERGOCALCIFEROL) 03739 units (1250 mcg) capsule Take 1 capsule (50,000 Units) by mouth once a week More than a month at Unknown time Yes Gely Le MD   order for DME Equipment being ordered: [employer]PIR[employer] DREAM STATION WITH HH AND MIRAGE FX FOR HER NASAL MASK WITH CHINSTRAP.  PRESSURE 7-15 CM.   Reported, Patient       "

## 2021-10-09 NOTE — H&P
History and Physical     Tati Hamilton MRN# [id number]   YOB: 1980 Age: 40 year old      Date of Admission:  10/8/2021    Primary care provider: Gely Le          Assessment and Plan:     Summary of Stay: Tati Hamilton is a 40 year old female with a history of htn, deepression with anxiety, MS dx'd in 2001 not on any medications and seems quiescent, kidney sttones admitted on 10/8/2021 with refractory n/v and elevated LFTs    She admits to drinking on the WE and reports elevated LFTSearlier this year that resolved when she went for a period of time without drinking.    She noticed some RUQ pain starting last night, then around noon today she started throwing up.  She denies any fevers.  No hematemesis.  No diarrhea, no sick contacts.  She routinely drinks 4 drinks on Friday and Saturday    VSS but in the ER she cannot stop throwing up.  She rec'd 1 L of NS and ondansetron and metoclopramide without relief.  She feels dehydrated.    She really wanted to go home but unfortunately her nausea under control    Labs with moderately elevated LFTs 250 range and slightly up compared with her January numbers, not in a classic fashion for etoh.  Lipase is wnl. She is Hep B vaccinated, and was negative for HCV in 2/2021    I am asked to admit her to obs for symptom management    Problem List:   Refractory n/v   Gastritis vs gastroenteritis, ? etoh withdrawal  -trial of compazine and if that fails will try lorazepam   -famotidine  -she was given a liter of NS in the ER and I'll give her another liter overnight     RUQ pain with steato-hepatitis on CT scanning  Does not appear in hepatic failure  -suspect pain is from swelling and capsule distension  -will offer prn oxycodone    Elevated LFTs ? Viral vs autoimmune, ? If etoh use makes her vulnerable. Advised sobriety   -will check for autoimmune abs dominique in light of her prior hx of MS  -mono spot too    htn  Would resume pta regimen  when med rec complete    DVT Prophylaxis: Ambulate every shift  Code Status: Full Code  Functional Status:  independent  Joe: not needed  Access:  PIV              Chief Complaint:     RUQ abdominal pain n/v       History of Present Illness:   Tati Hamilton is a 40 year old female with a history of htn, deepression with anxiety, MS dx'd in 2001 not on any medications and seems quiescent, kidney sttones admitted on 10/8/2021 with refractory n/v and elevated LFTs    She admits to drinking on the WE and reports elevated LFTSearlier this year that resolved when she went for a period of time without drinking.    She noticed some RUQ pain starting last night, then around noon today she started throwing up.  She denies any fevers.  No hematemesis.  No diarrhea, no sick contacts.  She routinely drinks 4 drinks on Friday and Saturday    VSS but in the ER she cannot stop throwing up.  She rec'd 1 L of NS and ondansetron and metoclopramide without relief.  She feels dehydrated.    She really wanted to go home but unfortunately her nausea under control    Labs with moderately elevated LFTs 250 range and slightly up compared with her January numbers, not in a classic fashion for etoh.  Lipase is wnl. She is Hep B vaccinated, and was negative for HCV in 2/2021    I am asked to admit her to obs for symptom management      The history is obtained in discussion with the ER provider Dr Shell and the patient with good reliability         Past Medical History:     Past Medical History:   Diagnosis Date     Anxiety      Calculus of kidney      Depressive disorder      Depressive disorder, not elsewhere classified      ESTEPHANIA (generalised anxiety disorder)      High risk HPV infection 3/20/15,10/17/16    Not HPV 16 or 18, HR, Not 16 or 18     History of colposcopy 08/05/2020     HTN, goal below 140/90      Lung nodules 05/2010    Needs f/u CT Nov 2010     Multiple sclerosis (H) Dx in 2001     Multiple sclerosis (H)      PONV  "(postoperative nausea and vomiting)      Vitamin D deficiencies              Past Surgical History:     Past Surgical History:   Procedure Laterality Date     HC COLP CERVIX/UPPER VAGINA W LOOP ELEC BX CERVIX       HC REMOVAL OF TONSILS,<13 Y/O      Tonsils <12y.o.     LAPAROSCOPIC CHOLECYSTECTOMY  5/8/2014    Procedure: LAPAROSCOPIC CHOLECYSTECTOMY;  Surgeon: Sona Giraldo MD;  Location:  OR     [facility] NONSPECIFIC PROCEDURE      2 procedures for kidney stones             Social History:     Social History     Tobacco Use     Smoking status: Current Every Day Smoker     Packs/day: 0.50     Years: 4.00     Pack years: 2.00     Types: Cigarettes     Smokeless tobacco: Never Used     Tobacco comment: socially   Substance Use Topics     Alcohol use: Yes     Alcohol/week: 0.0 standard drinks     Comment: occasional             Family History:     Family History   Problem Relation Age of Onset     Gallbladder Disease Mother      Other - See Comments Mother         varicose veins      Hypertension Father      Rectal Cancer Maternal Grandfather      Cerebrovascular Disease Maternal Grandmother      Heart Disease Paternal Grandfather             Allergies:     Allergies   Allergen Reactions     No Known Drug Allergies              Medications:     Lisinopril and atenolol  Per patient          Review of Systems:     A Comprehensive greater than 10 system review of systems was carried out.  Pertinent positives and negatives are noted above.  Otherwise negative for contributory information.           Physical Exam:   Blood pressure 119/80, pulse 91, temperature 97.4  F (36.3  C), temperature source Temporal, resp. rate 16, height 1.702 m (5' 7\"), weight 70.3 kg (155 lb), last menstrual period 09/24/2021, SpO2 97 %, not currently breastfeeding.  Exam:    General:  Pleasant nad looks stated age  HEENT:  Head nc/at sclera clear PERRL O/P:  actively vomiting/retching  Neck is supple  Lungs: cta b nl effort   CV:  RRR no " m/r/g no le edema  Abd:  S/nt/nd no r/g.  She has some mild tenderness along  The right midclavicular line to deep palpation  Neuro:  Cn 2-12 grossly intact and cain  Alert and oriented affect appropriate   Skin:  W/d no c/c               Data:          Lab Results   Component Value Date     10/08/2021     01/15/2021    Lab Results   Component Value Date    CHLORIDE 97 10/08/2021    CHLORIDE 100 01/15/2021    Lab Results   Component Value Date    BUN 6 10/08/2021    BUN 6 01/15/2021      Lab Results   Component Value Date    POTASSIUM 3.3 10/08/2021    POTASSIUM 3.7 01/15/2021    Lab Results   Component Value Date    CO2 27 10/08/2021    CO2 27 01/15/2021    Lab Results   Component Value Date    CR 0.76 10/08/2021    CR 0.65 01/15/2021        Lab Results   Component Value Date    WBC 6.0 10/08/2021    HGB 12.3 10/08/2021    HCT 36.5 10/08/2021     (H) 10/08/2021     10/08/2021     Lab Results   Component Value Date    GLC 95 10/08/2021     Lab Results   Component Value Date     (H) 10/08/2021     (H) 10/08/2021    ALKPHOS 131 10/08/2021    BILITOTAL 0.5 10/08/2021    BILICONJ 0.0 04/14/2014            Imaging:     Recent Results (from the past 24 hour(s))   CT Abdomen Pelvis w Contrast    Narrative    EXAM: CT ABDOMEN PELVIS W CONTRAST  LOCATION: [facility]  DATE/TIME: 10/8/2021 5:56 PM    INDICATION: Nausea/vomiting. Abdominal pain, acute, nonlocalized.  COMPARISON: 11/01/2017.  TECHNIQUE: CT scan of the abdomen and pelvis was performed following injection of IV contrast. Multiplanar reformats were obtained. Dose reduction techniques were used.  CONTRAST: 82 mL Isovue-370.    FINDINGS:   LOWER CHEST: No infiltrate or effusion. Small hiatal hernia.    HEPATOBILIARY: Severe hepatic steatosis. No focal lesions. Cholecystectomy. No biliary dilatation.    PANCREAS: No significant mass, duct dilatation, or inflammatory change.    SPLEEN: Normal  size.    ADRENAL GLANDS: Stable benign adrenal adenoma on the right. Left adrenal gland unremarkable.    KIDNEYS/BLADDER: No significant mass, stone, or hydronephrosis.    BOWEL: No obstruction or inflammatory change.    LYMPH NODES: No lymphadenopathy.    VASCULATURE: No abdominal aortic aneurysm.    PELVIC ORGANS: No pelvic masses. Trace free fluid.    MUSCULOSKELETAL: No frankly destructive bony lesions.      Impression    IMPRESSION:   1.  Severe hepatic steatosis, question steatohepatitis.  2.  No bowel obstruction.

## 2021-10-09 NOTE — ED NOTES
RECEIVING UNIT ED HANDOFF REVIEW    Above ED Nurse Handoff Report was reviewed:  YES  Reviewed by: Yue Borden, RN on October 8, 2021 at 9:48 PM   Did you vocera the ED RN: [facility]  ED Nurse Handoff Report    Tati Hamilton is a 40 year old female   ED Chief complaint: Dizziness  . ED Diagnosis:   Final diagnoses:   None     Allergies:   Allergies   Allergen Reactions     No Known Drug Allergies        Code Status: Full Code  Activity level - Baseline/Home:  Stand by Assist. Activity Level - Current:   Stand by Assist. Lift room needed: No. Bariatric: No   Needed: No   Isolation: No. Infection: Not Applicable.     Vital Signs:   Vitals:    10/08/21 1600 10/08/21 1700 10/08/21 1730 10/08/21 1745   BP: (!) 130/102      Pulse: 90  73 87   Resp:       Temp:       SpO2: 99% 98% 98% 97%   Weight:           Cardiac Rhythm:  ,      Pain level:    Patient confused: No. Patient Falls Risk: Yes.   Elimination Status: Has voided   Patient Report - Initial Complaint: Patient reports 2 wks abdominal pain. Focused Assessment: Patient reports 2 weeks of abdominal pain with vomiting that started today.     Tests Performed: labs, imaging. Abnormal Results:   Labs Ordered and Resulted from Time of ED Arrival Up to the Time of Departure from the ED   COMPREHENSIVE METABOLIC PANEL - Abnormal; Notable for the following components:       Result Value    Potassium 3.3 (*)     Urea Nitrogen 6 (*)      (*)      (*)     All other components within normal limits   ROUTINE UA WITH MICROSCOPIC REFLEX TO CULTURE - Abnormal; Notable for the following components:    Appearance Urine Cloudy (*)     Ketones Urine Trace (*)     Protein Albumin Urine 70  (*)     Bacteria Urine Many (*)     Mucus Urine Present (*)     WBC Urine 15 (*)     Squamous Epithelials Urine 15 (*)     All other components within normal limits    Narrative:     Urine Culture ordered based on laboratory criteria   CBC WITH  PLATELETS AND DIFFERENTIAL - Abnormal; Notable for the following components:    RBC Count 3.54 (*)      (*)     MCH 34.7 (*)     RDW 17.2 (*)     All other components within normal limits   LIPASE - Normal   HCG QUALITATIVE URINE - Normal   EXTRA BLUE TOP TUBE   EXTRA RED TOP TUBE   EXTRA GREEN TOP (LITHIUM HEPARIN) TUBE   EXTRA PURPLE TOP TUBE   ACETAMINOPHEN LEVEL   COVID-19 VIRUS (CORONAVIRUS) BY PCR   PERIPHERAL IV CATHETER   PULSE OXIMETRY NURSING   URINE CULTURE   EXTRA TUBE    Narrative:     The following orders were created for panel order Extra Tube ([place] Draw).  Procedure                               Abnormality         Status                     ---------                               -----------         ------                     Extra Blue Top Tube[[id number]]                              Final result               Extra Red Top Tube[[id number]]                               Final result               Extra Green Top (Lithium...[[id number]]                      Final result               Extra Purple Top Tube[[id number]]                            Final result                 Please view results for these tests on the individual orders.   CBC WITH PLATELETS & DIFFERENTIAL    Narrative:     The following orders were created for panel order CBC with platelets differential.  Procedure                               Abnormality         Status                     ---------                               -----------         ------                     CBC with platelets and d...[[id number]]  Abnormal            Final result                 Please view results for these tests on the individual orders.     CT Abdomen Pelvis w Contrast   Final Result   IMPRESSION:    1.  Severe hepatic steatosis, question steatohepatitis.   2.  No bowel obstruction.      .   Treatments provided: Medications  Family Comments: Family present  OBS brochure/video discussed/provided to patient:  N/A  ED Medications:   Medications    morphine (PF) injection 4 mg (has no administration in time range)   metoclopramide (REGLAN) injection 10 mg (has no administration in time range)   potassium chloride 10 mEq in 100 mL sterile water intermittent infusion (premix) (has no administration in time range)   0.9% sodium chloride BOLUS (1,000 mLs Intravenous New Bag 10/8/21 1608)   ondansetron (ZOFRAN) injection 4 mg (4 mg Intravenous Given 10/8/21 1611)   acetaminophen (TYLENOL) tablet 1,000 mg (1,000 mg Oral Given 10/8/21 1609)   morphine (PF) injection 4 mg (4 mg Intravenous Given 10/8/21 1610)   ketorolac (TORADOL) injection 15 mg (15 mg Intravenous Given 10/8/21 1726)   potassium chloride (KLOR-CON) Packet 20 mEq (20 mEq Oral Given 10/8/21 1727)   CT Flush 100mL Saline (60 mLs Intravenous Given 10/8/21 1759)   iopamidol (ISOVUE-370) solution 500 mL (82 mLs Intravenous Given 10/8/21 1759)     Drips infusing:  No  For the majority of the shift, the patient's behavior Green. Interventions performed were None.    Sepsis treatment initiated: No     Patient tested for COVID 19 prior to admission: YES    ED Nurse Name/Phone Number: Leila Castano RN,   7:14 PM

## 2021-10-10 LAB
BACTERIA UR CULT: NORMAL
SMA IGG SER-ACNC: 6 UNITS

## 2021-10-11 ENCOUNTER — PATIENT OUTREACH (OUTPATIENT)
Dept: INTERNAL MEDICINE | Facility: CLINIC | Age: 41
End: 2021-10-11

## 2021-10-11 NOTE — TELEPHONE ENCOUNTER
Letter for work done    Pain medication would have been started in the hospital if appropriate.

## 2021-10-11 NOTE — TELEPHONE ENCOUNTER
Patient requesting letter excusing her from work today following Hospital stay on 10/8, plans to return to work tomorrow.    She also asks what type of pain medication she can take. Previously was taking Tylenol as Ibuprofen was not good for her stomach, but asks if she can continue to take Tylenol with the new diagnosis of fatty liver.     Patient has follow-up appointment scheduled.   Next 5 appointments (look out 90 days)    Oct 19, 2021 10:20 AM  SHORT with Gely Le MD  [facility] ([facility] ) [address]  [facility] [address]  [phone]           Svetlana Martinez RN  [facility]

## 2021-10-11 NOTE — TELEPHONE ENCOUNTER
"Hospital/TCU/ED for chronic condition Discharge Protocol    \"Hi, my name is Svetlana Martinez RN, a registered nurse, and I am calling from [facility].  I am calling to follow up and see how things are going for you after your recent emergency visit/hospital/TCU stay.\"    Tell me how you are doing now that you are home?\" Vomited once today, but thinks she may have overeaten yesterday. Pain is improving.      Discharge Instructions    \"Let's review your discharge instructions.  What is/are the follow-up recommendations?  Pt. Response: follow-up within 2 weeks    \"Has an appointment with your primary care provider been scheduled?\"   No (schedule appointment)    \"When you see the provider, I would recommend that you bring your medications with you.\"    Medications    \"Tell me what changed about your medicines when you discharged?\"    Changes to chronic meds?    0-1    \"What questions do you have about your medications?\"    None     New diagnoses of heart failure, COPD, diabetes, or MI?    No              Post Discharge Medication Reconciliation Status: discharge medications reconciled, continue medications without change.    Was MTM referral placed (*Make sure to put transitions as reason for referral)?   No    Call Summary    \"What questions or concerns do you have about your recent visit and your follow-up care?\"     none    \"If you have questions or things don't continue to improve, we encourage you contact us through the main clinic number (give number).  Even if the clinic is not open, triage nurses are available 24/7 to help you.     We would like you to know that our clinic has extended hours (provide information).  We also have urgent care (provide details on closest location and hours/contact info)\"      \"Thank you for your time and take care!\"             Svetlana Martinez RN  [facility]   "

## 2021-10-12 ENCOUNTER — OFFICE VISIT (OUTPATIENT)
Dept: INTERNAL MEDICINE | Facility: CLINIC | Age: 41
End: 2021-10-12
Payer: COMMERCIAL

## 2021-10-12 DIAGNOSIS — I10 HTN, GOAL BELOW 140/90: ICD-10-CM

## 2021-10-12 DIAGNOSIS — Z09 HOSPITAL DISCHARGE FOLLOW-UP: Primary | ICD-10-CM

## 2021-10-12 DIAGNOSIS — D64.9 ANEMIA, UNSPECIFIED TYPE: ICD-10-CM

## 2021-10-12 DIAGNOSIS — K70.10 ALCOHOLIC HEPATITIS WITHOUT ASCITES (H): ICD-10-CM

## 2021-10-12 DIAGNOSIS — K29.20 ACUTE ALCOHOLIC GASTRITIS WITHOUT HEMORRHAGE: ICD-10-CM

## 2021-10-12 DIAGNOSIS — F41.1 GENERALIZED ANXIETY DISORDER: ICD-10-CM

## 2021-10-12 PROBLEM — M85.80 OSTEOPENIA: Status: ACTIVE | Noted: 2021-10-12

## 2021-10-12 LAB — MITOCHONDRIA M2 IGG SER-ACNC: 1.3 U/ML

## 2021-10-12 PROCEDURE — 99495 TRANSJ CARE MGMT MOD F2F 14D: CPT | Performed by: INTERNAL MEDICINE

## 2021-10-12 RX ORDER — ATENOLOL 25 MG/1
25 TABLET ORAL DAILY
Qty: 90 TABLET | Refills: 1 | Status: SHIPPED | OUTPATIENT
Start: 2021-10-12 | End: 2022-06-06

## 2021-10-12 RX ORDER — SUCRALFATE 1 G/1
1 TABLET ORAL 4 TIMES DAILY
Qty: 60 TABLET | Refills: 1 | Status: SHIPPED | OUTPATIENT
Start: 2021-10-12 | End: 2022-01-25

## 2021-10-12 RX ORDER — LORAZEPAM 0.5 MG/1
TABLET ORAL
Qty: 30 TABLET | Refills: 0 | Status: SHIPPED | OUTPATIENT
Start: 2021-10-12 | End: 2022-01-20

## 2021-10-12 RX ORDER — LISINOPRIL 20 MG/1
20 TABLET ORAL 2 TIMES DAILY
Qty: 180 TABLET | Refills: 1 | Status: SHIPPED | OUTPATIENT
Start: 2021-10-12 | End: 2022-06-07

## 2021-10-12 NOTE — TELEPHONE ENCOUNTER
Patient advised. She did not come to work today due to continued pain, asks if letter can be changed to return work on 10/13.     Today she is still having side pain rated at 5/10, but different than before. No further vomiting, but having pain when she eats and feeling liquids moving through her body when she drinks anything. Patient asks again about pan medication, asks what OTC medication she should be taking to help with pain due to liver enzymes (does not want narcotic).     She also asks about lab results, curious about the change in liver enzymes and drop in hemoglobin from initial labs to before she was discharged.     Svetlana Martinez RN  [facility]

## 2021-10-12 NOTE — PROGRESS NOTES
Patient's instructions / PLAN:                                                        Plan:  1. Letter for work  2. Sucralfate 1 gram 4 times a day   3. Continue the other meds, same doses for now.  4. No alcohol !!!  5. Keep the psychiatry appointment next week    ASSESSMENT & PLAN:                                                      (Z09) Hospital discharge follow-up  (primary encounter diagnosis)  Comment:   Plan:     (K29.20) Acute alcoholic gastritis without hemorrhage  Comment:   Plan: sucralfate (CARAFATE) 1 GM tablet            (K70.10) Alcoholic hepatitis without ascites  Comment:   Plan:     (I10) HTN, goal below 140/90  Comment: Controlled    Plan: lisinopril (ZESTRIL) 20 MG tablet, atenolol         (TENORMIN) 25 MG tablet            (F41.1) Generalized anxiety disorder  Comment: Not controlled   Plan: LORazepam (ATIVAN) 0.5 MG tablet       as above      (D64.9) Anemia, unspecified type  Comment: likely dilutional   Plan:        Chief Complaint:                                                      Hosp f/u       SUBJECTIVE:                                                    History of present illness     Hosp f/u     Discharge Diagnoses   Nausea and vomiting  Probable alcoholic gastritis  Right upper quadrant abdominal pain from steatohepatitis  Alcoholic liver disease  Hypertension.       LFTs elevated.   Hgb low - possible hemodilution after aggressive iv fluids  RUQ pain  -- likely distended liver     Depression  -- Not controlled  -- she has tried diff meds w no benefits  -- she has psych appointment next week     ROS:                                                      ROS: negative for fever, chills, cough, wheezes, chest pain, shortness of breath, vomiting,  leg swelling Pos for abd pain    A 10-point review of systems was obtained.  Those pertinent are above and in the in the Subjective section.  The rest of the systems are negative.        OBJECTIVE:                                                     Physical Exam :    Last menstrual period 09/24/2021, not currently breastfeeding.   NAD, appears comfortable  Skin: no rashes   Neck: supple, no JVD, No thyroidmegaly. Lymph nodes nonpalpable cervical and supraclavicular.  Chest: clear to auscultation bilaterally, good respiratory effort  Heart: S1 S2, RRR, no mgr appreciated  Abdomen: soft, RUQ tender, no hepatosplenomegaly or masses appreciated, no abdominal bruit, present bowel sounds  Extremities: no edema,   Neurologic: A, Ox3, no focal signs appreciated    PMHx: reviewed  Past Medical History:   Diagnosis Date     Anxiety      Calculus of kidney      Depressive disorder      Depressive disorder, not elsewhere classified      ESTEPHANIA (generalised anxiety disorder)      High risk HPV infection 3/20/15,10/17/16    Not HPV 16 or 18, HR, Not 16 or 18     History of colposcopy 08/05/2020     HTN, goal below 140/90      Lung nodules 05/2010    Needs f/u CT Nov 2010     Multiple sclerosis (H) Dx in 2001     Multiple sclerosis (H)      PONV (postoperative nausea and vomiting)      Vitamin D deficiencies       PSHx: reviewed  Past Surgical History:   Procedure Laterality Date     HC COLP CERVIX/UPPER VAGINA W LOOP ELEC BX CERVIX       HC REMOVAL OF TONSILS,<13 Y/O      Tonsils <12y.o.     LAPAROSCOPIC CHOLECYSTECTOMY  5/8/2014    Procedure: LAPAROSCOPIC CHOLECYSTECTOMY;  Surgeon: Sona Giraldo MD;  Location:  OR     [facility] NONSPECIFIC PROCEDURE      2 procedures for kidney stones        Meds: reviewed  Current Outpatient Medications   Medication Sig Dispense Refill     atenolol (TENORMIN) 25 MG tablet Take 1 tablet (25 mg) by mouth daily 90 tablet 0     famotidine (PEPCID) 20 MG tablet Take 1 tablet (20 mg) by mouth 2 times daily 60 tablet 0     lisinopril (ZESTRIL) 20 MG tablet Take 1 tablet (20 mg) by mouth 2 times daily 180 tablet 1     LORazepam (ATIVAN) 0.5 MG tablet 1 po  qd prn anxiety 30 tablet 0     order for DME Equipment being ordered:  RESPIRONICS DREAM STATION WITH HH AND MIRAGE FX FOR HER NASAL MASK WITH CHINSTRAP.  PRESSURE 7-15 CM.       prochlorperazine (COMPAZINE) 10 MG tablet Take 1 tablet (10 mg) by mouth every 6 hours as needed for nausea or vomiting 15 tablet 0     vitamin D2 (ERGOCALCIFEROL) 00653 units (1250 mcg) capsule Take 1 capsule (50,000 Units) by mouth once a week 12 capsule 1       Soc Hx: reviewed  Fam Hx: reviewed              Subjective   Sharee is a 40 year old who presents for the following health issues     HPI       Hospital Follow-up Visit:    Hospital/Nursing Home/IP Rehab Facility: [facility]  Date of Admission: Oct 8, 2021  Date of Discharge: Oct 9  Reason(s) for Admission: GI symptoms       Was your hospitalization related to COVID-19? No   Problems taking medications regularly:  None  Medication changes since discharge: None  Problems adhering to non-medication therapy:  .    Summary of hospitalization:  [facility] discharge summary reviewed  Diagnostic Tests/Treatments reviewed.  Follow up needed: as above   Other Healthcare Providers Involved in Patient s Care:         psychiatry   Update since discharge: stable. Post Discharge Medication Reconciliation: discharge medications reconciled and changed, per note/orders.  Plan of care communicated with patient                Review of Systems         Objective    LMP 09/24/2021   There is no height or weight on file to calculate BMI.  Physical Exam                     Gely Garcia MD  Internal Medicine

## 2021-10-12 NOTE — PATIENT INSTRUCTIONS
Plan:  1. Letter for work  2. Sucralfate 1 gram 4 times a day   3. Continue the other meds, same doses for now.  4. No alcohol !!!  5. Keep the psychiatry appointment next week

## 2021-10-12 NOTE — TELEPHONE ENCOUNTER
Called patient and advised her of options for appointment today, patient scheduled in-person appointment.    Next 5 appointments (look out 90 days)    Oct 12, 2021  4:20 PM  SHORT with Gely Le MD  [facility] ([facility] ) [address] [facility] [address]  [phone]   Oct 19, 2021 10:20 AM  SHORT with Gely Le MD  [facility] ([facility] ) [address] [facility] [address]  [phone]           Svetlana Martinez RN  [facility]

## 2021-10-14 ENCOUNTER — PATIENT OUTREACH (OUTPATIENT)
Dept: INTERNAL MEDICINE | Facility: CLINIC | Age: 41
End: 2021-10-14

## 2021-10-14 NOTE — TELEPHONE ENCOUNTER
Started hospital follow up encounter before writer realized patient had already been contacted. Closing this encounter.

## 2021-10-29 ENCOUNTER — ALLIED HEALTH/NURSE VISIT (OUTPATIENT)
Dept: INTERNAL MEDICINE | Facility: CLINIC | Age: 41
End: 2021-10-29
Payer: COMMERCIAL

## 2021-10-29 DIAGNOSIS — G47.33 OBSTRUCTIVE SLEEP APNEA (ADULT) (PEDIATRIC): Primary | ICD-10-CM

## 2021-10-29 DIAGNOSIS — Z23 NEED FOR PROPHYLACTIC VACCINATION AND INOCULATION AGAINST INFLUENZA: Primary | ICD-10-CM

## 2021-10-29 PROCEDURE — 90471 IMMUNIZATION ADMIN: CPT

## 2021-10-29 PROCEDURE — 90686 IIV4 VACC NO PRSV 0.5 ML IM: CPT

## 2021-10-29 PROCEDURE — 99207 PR NO CHARGE NURSE ONLY: CPT

## 2021-12-14 ENCOUNTER — LAB (OUTPATIENT)
Dept: LAB | Facility: CLINIC | Age: 41
End: 2021-12-14
Payer: COMMERCIAL

## 2021-12-14 ENCOUNTER — TELEPHONE (OUTPATIENT)
Dept: INTERNAL MEDICINE | Facility: CLINIC | Age: 41
End: 2021-12-14

## 2021-12-14 DIAGNOSIS — R30.0 DYSURIA: Primary | ICD-10-CM

## 2021-12-14 DIAGNOSIS — R30.0 DYSURIA: ICD-10-CM

## 2021-12-14 LAB
ALBUMIN UR-MCNC: 100 MG/DL
APPEARANCE UR: ABNORMAL
BACTERIA #/AREA URNS HPF: ABNORMAL /HPF
BILIRUB UR QL STRIP: ABNORMAL
COLOR UR AUTO: ABNORMAL
GLUCOSE UR STRIP-MCNC: NEGATIVE MG/DL
HGB UR QL STRIP: ABNORMAL
KETONES UR STRIP-MCNC: ABNORMAL MG/DL
LEUKOCYTE ESTERASE UR QL STRIP: ABNORMAL
NITRATE UR QL: NEGATIVE
PH UR STRIP: 5.5 [PH] (ref 5–7)
RBC #/AREA URNS AUTO: ABNORMAL /HPF
SP GR UR STRIP: >=1.03 (ref 1–1.03)
SQUAMOUS #/AREA URNS AUTO: ABNORMAL /LPF
UROBILINOGEN UR STRIP-ACNC: 1 E.U./DL
WBC #/AREA URNS AUTO: >100 /HPF

## 2021-12-14 PROCEDURE — 87086 URINE CULTURE/COLONY COUNT: CPT

## 2021-12-14 PROCEDURE — 87186 SC STD MICRODIL/AGAR DIL: CPT

## 2021-12-14 PROCEDURE — 81001 URINALYSIS AUTO W/SCOPE: CPT

## 2021-12-14 RX ORDER — SULFAMETHOXAZOLE/TRIMETHOPRIM 800-160 MG
1 TABLET ORAL 2 TIMES DAILY
Qty: 14 TABLET | Refills: 0 | Status: SHIPPED | OUTPATIENT
Start: 2021-12-14 | End: 2022-01-25

## 2021-12-16 LAB — BACTERIA UR CULT: ABNORMAL

## 2021-12-20 ENCOUNTER — TELEPHONE (OUTPATIENT)
Dept: INTERNAL MEDICINE | Facility: CLINIC | Age: 41
End: 2021-12-20
Payer: COMMERCIAL

## 2021-12-20 DIAGNOSIS — K13.79 MOUTH SORES: Primary | ICD-10-CM

## 2021-12-20 RX ORDER — DIPHENHYDRAMINE HYDROCHLORIDE AND LIDOCAINE HYDROCHLORIDE AND ALUMINUM HYDROXIDE AND MAGNESIUM HYDRO
5-10 KIT EVERY 4 HOURS PRN
Qty: 240 ML | Refills: 1 | Status: SHIPPED | OUTPATIENT
Start: 2021-12-20 | End: 2022-01-25

## 2021-12-20 NOTE — TELEPHONE ENCOUNTER
Patient has several sores on the side of her tongue. Sores are painful to eat and talk. Requesting medication to help with pain.     Svetlana Martinez RN  [facility]

## 2022-01-07 ENCOUNTER — IMMUNIZATION (OUTPATIENT)
Dept: NURSING | Facility: CLINIC | Age: 42
End: 2022-01-07
Payer: COMMERCIAL

## 2022-01-07 PROCEDURE — 91306 COVID-19,PF,MODERNA (18+ YRS BOOSTER .25ML): CPT

## 2022-01-07 PROCEDURE — 0064A COVID-19,PF,MODERNA (18+ YRS BOOSTER .25ML): CPT

## 2022-01-19 DIAGNOSIS — F41.1 GENERALIZED ANXIETY DISORDER: ICD-10-CM

## 2022-01-19 NOTE — TELEPHONE ENCOUNTER
Routing refill request to provider for review/approval because:  Drug not on the [facility] refill protocol     Svetlana Martinez RN  [facility]

## 2022-01-20 RX ORDER — LORAZEPAM 0.5 MG/1
TABLET ORAL
Qty: 30 TABLET | Refills: 0 | Status: SHIPPED | OUTPATIENT
Start: 2022-01-20 | End: 2022-04-25

## 2022-01-25 ENCOUNTER — OFFICE VISIT (OUTPATIENT)
Dept: OBGYN | Facility: CLINIC | Age: 42
End: 2022-01-25
Payer: COMMERCIAL

## 2022-01-25 ENCOUNTER — VIRTUAL VISIT (OUTPATIENT)
Dept: INTERNAL MEDICINE | Facility: CLINIC | Age: 42
End: 2022-01-25
Payer: COMMERCIAL

## 2022-01-25 VITALS — WEIGHT: 153 LBS | SYSTOLIC BLOOD PRESSURE: 112 MMHG | DIASTOLIC BLOOD PRESSURE: 76 MMHG | BODY MASS INDEX: 23.96 KG/M2

## 2022-01-25 DIAGNOSIS — K21.00 GASTROESOPHAGEAL REFLUX DISEASE WITH ESOPHAGITIS WITHOUT HEMORRHAGE: Primary | ICD-10-CM

## 2022-01-25 DIAGNOSIS — B97.7 HIGH RISK HPV INFECTION: ICD-10-CM

## 2022-01-25 DIAGNOSIS — Z11.3 SCREEN FOR STD (SEXUALLY TRANSMITTED DISEASE): ICD-10-CM

## 2022-01-25 DIAGNOSIS — Z12.4 SCREENING FOR CERVICAL CANCER: ICD-10-CM

## 2022-01-25 DIAGNOSIS — Z12.31 ENCOUNTER FOR SCREENING MAMMOGRAM FOR BREAST CANCER: ICD-10-CM

## 2022-01-25 DIAGNOSIS — Z01.419 ENCOUNTER FOR GYNECOLOGICAL EXAMINATION WITHOUT ABNORMAL FINDING: Primary | ICD-10-CM

## 2022-01-25 PROCEDURE — 87624 HPV HI-RISK TYP POOLED RSLT: CPT | Performed by: OBSTETRICS & GYNECOLOGY

## 2022-01-25 PROCEDURE — 87491 CHLMYD TRACH DNA AMP PROBE: CPT | Performed by: OBSTETRICS & GYNECOLOGY

## 2022-01-25 PROCEDURE — 99396 PREV VISIT EST AGE 40-64: CPT | Performed by: OBSTETRICS & GYNECOLOGY

## 2022-01-25 PROCEDURE — 87591 N.GONORRHOEAE DNA AMP PROB: CPT | Performed by: OBSTETRICS & GYNECOLOGY

## 2022-01-25 PROCEDURE — G0145 SCR C/V CYTO,THINLAYER,RESCR: HCPCS | Performed by: OBSTETRICS & GYNECOLOGY

## 2022-01-25 PROCEDURE — 99213 OFFICE O/P EST LOW 20 MIN: CPT | Mod: 95 | Performed by: INTERNAL MEDICINE

## 2022-01-25 RX ORDER — FAMOTIDINE 20 MG/1
20 TABLET, FILM COATED ORAL 2 TIMES DAILY
Qty: 180 TABLET | Refills: 3 | Status: ON HOLD | OUTPATIENT
Start: 2022-01-25 | End: 2024-09-21

## 2022-01-25 NOTE — NURSING NOTE
"Chief Complaint   Patient presents with     Physical     initial /76   Wt 69.4 kg (153 lb)   LMP 01/13/2022 (Approximate)   BMI 23.96 kg/m   Estimated body mass index is 23.96 kg/m  as calculated from the following:    Height as of 10/8/21: 1.702 m (5' 7\").    Weight as of this encounter: 69.4 kg (153 lb).  BP completed using cuff size regular.  Triny Angulo CMA    " No

## 2022-01-25 NOTE — PROGRESS NOTES
SUBJECTIVE:                                                      Tati is a 41 year old  female who presents for annual exam. She is due for a Pap/HPV screen.  Had ASCUS Pap with +HPV other in 2020 with colpo 2020 showing benign ECC.    Patient's last menstrual period was 2022 (approximate).. Menses are regular q 28-30 days and normal lasting 4 days.  Using nothing for contraception.  Not presently sexually active.  She is not currently considering pregnancy.  Besides routine health maintenance,  she would like to discuss obtaining a mammogram.  She recently lost her job and will lose her insurance coverage as a result.  Very anxious about this.  GYNECOLOGIC HISTORY:    Tati is not sexually active with  male partners       History sexually transmitted infections:HPV    History of abnormal Pap smear:   Last 3 Pap and HPV Results:   PAP / HPV Latest Ref Rng & Units 7/3/2020 2018 10/17/2016   PAP (Historical) - ASC-US(A) NIL NIL   HPV16 NEG:Negative Negative Negative Negative   HPV18 NEG:Negative Negative Negative Negative   HRHPV NEG:Negative Positive(A) Positive(A) Positive(A)     Family history of breast CA: No  Family history of uterine/ovarian CA: No    Family history of colon CA: No      HISTORY:  OB History    Para Term  AB Living   2 1 1 0 1 1   SAB IAB Ectopic Multiple Live Births   1 0 0 0 1      # Outcome Date GA Lbr Santos/2nd Weight Sex Delivery Anes PTL Lv   2 Term 07 37w0d 12:00 3.005 kg (6 lb 10 oz) M  EPIDURAL  PATRICIA      Birth Comments: induced due to PIH      Name: Josias Mcadams SAB              Past Medical History:   Diagnosis Date     Anxiety      Calculus of kidney      Depressive disorder      Depressive disorder, not elsewhere classified      ESTEPHANIA (generalised anxiety disorder)      High risk HPV infection 3/20/15,10/17/16    Not HPV 16 or 18, HR, Not 16 or 18     History of colposcopy 2020     HTN, goal below 140/90      Lung nodules 2010     Needs f/u CT Nov 2010     Multiple sclerosis (H) Dx in 2001     Multiple sclerosis (H)      PONV (postoperative nausea and vomiting)      Vitamin D deficiencies      Past Surgical History:   Procedure Laterality Date     HC COLP CERVIX/UPPER VAGINA W LOOP ELEC BX CERVIX       HC REMOVAL OF TONSILS,<11 Y/O      Tonsils <12y.o.     LAPAROSCOPIC CHOLECYSTECTOMY  5/8/2014    Procedure: LAPAROSCOPIC CHOLECYSTECTOMY;  Surgeon: Sona Giraldo MD;  Location: [facility] OR     ZZC NONSPECIFIC PROCEDURE      2 procedures for kidney stones     Family History   Problem Relation Age of Onset     Gallbladder Disease Mother      Other - See Comments Mother         varicose veins      Hypertension Father      Rectal Cancer Maternal Grandfather      Cerebrovascular Disease Maternal Grandmother      Heart Disease Paternal Grandfather      Social History     Socioeconomic History     Marital status:      Spouse name: None     Number of children: None     Years of education: None     Highest education level: None   Occupational History     None   Tobacco Use     Smoking status: Current Every Day Smoker     Packs/day: 0.50     Years: 4.00     Pack years: 2.00     Types: Cigarettes     Smokeless tobacco: Never Used     Tobacco comment: socially   Vaping Use     Vaping Use: Never used   Substance and Sexual Activity     Alcohol use: Yes     Alcohol/week: 0.0 standard drinks     Comment: occasional     Drug use: No     Sexual activity: Not Currently     Partners: Male     Birth control/protection: None, Other, Pull-out method   Other Topics Concern     Parent/sibling w/ CABG, MI or angioplasty before 65F 55M? Not Asked   Social History Narrative     None     Social Determinants of Health     Financial Resource Strain: Not on file   Food Insecurity: Not on file   Transportation Needs: Not on file   Physical Activity: Not on file   Stress: Not on file   Social Connections: Not on file   Intimate Partner Violence: Not on file    Housing Stability: Not on file       Current Outpatient Medications:      atenolol (TENORMIN) 25 MG tablet, Take 1 tablet (25 mg) by mouth daily, Disp: 90 tablet, Rfl: 1     famotidine (PEPCID) 20 MG tablet, Take 1 tablet (20 mg) by mouth 2 times daily, Disp: 180 tablet, Rfl: 3     lisinopril (ZESTRIL) 20 MG tablet, Take 1 tablet (20 mg) by mouth 2 times daily, Disp: 180 tablet, Rfl: 1     LORazepam (ATIVAN) 0.5 MG tablet, 1 po  qd prn anxiety, Disp: 30 tablet, Rfl: 0     order for DME, Equipment being ordered: [employer] DREAM STATION WITH HH AND MIRAGE FX FOR HER NASAL MASK WITH CHINSTRAP.  PRESSURE 7-15 CM., Disp: , Rfl:      vitamin D2 (ERGOCALCIFEROL) 18666 units (1250 mcg) capsule, Take 1 capsule (50,000 Units) by mouth once a week, Disp: 12 capsule, Rfl: 1     Allergies   Allergen Reactions     No Known Drug Allergies        Past medical, surgical, social and family history were reviewed and updated in EPIC.    ROS:   12 point review of systems negative other than symptoms noted below.      OBJECTIVE:                                                      EXAM:  /76   Wt 69.4 kg (153 lb)   LMP 01/13/2022 (Approximate)   BMI 23.96 kg/m     BMI: Body mass index is 23.96 kg/m .  General: Alert and oriented, no distress.  Psychiatric: Mood and affect within normal limits.  Skin: Warm and dry, no lesions, rashes or discolorations.  Neck: Neck supple. Thyroid palpbably normal in size and without nodularity.  Cardiovascular: Regular rate and rhythm, no murmurs, rubs or gallops.   Lungs:  Clear to auscultation bilaterally, breathing is unlabored.  Breasts:  Symmetric, no skin changes.  No dominant masses bilaterally.   Lymph:  No cervical, supraclavicular, infraclavicular, axillary or inguinal lymphadenopathy palpable.   Abdomen: Soft, nontender, no hepatosplenomegaly, no rebound or guarding, no masses, no hernias.   Vulva:  No external lesions, normal female hair distribution, no inguinal adenopathy.     Urethra:  Midline, non-tender, well supported, no discharge  Vagina:  Well-estrogenized, no abnormal discharge, no lesions  Cervix: no lesions, no discharge, multiparous and Pap and GC/Chlamydia obtained  Uterus:  anteverted, smooth contour, without enlargement, mobile, and without tenderness  Ovaries:  No masses appreciated, non-tender, mobile  Rectal Exam: deferred  Musculoskeletal: extremities normal      COUNSELING:   Reviewed preventive health counseling, as reflected in patient instructions       Safe sex practices/STD prevention   reports that she has been smoking cigarettes. She has a 2.00 pack-year smoking history. She has never used smokeless tobacco.  Tobacco Cessation Action Plan: Information offered: Patient not interested at this time      ASSESSMENT/PLAN:                                                      41 year old female with satisfactory annual exam  (Z01.419) Encounter for gynecological examination without abnormal finding  (primary encounter diagnosis)  Comment: Normal exam  Plan: Pap screen with HPV - recommended age 30 - 65         years        Performed today    (B97.7) High risk HPV infection  Comment:   Plan: Pap screen with HPV - recommended age 30 - 65         years            (Z12.4) Screening for cervical cancer  Comment:   Plan: Pap screen with HPV - recommended age 30 - 65         years            (Z11.3) Screen for STD (sexually transmitted disease)  Comment: No symptoms or known exposure  Plan: NEISSERIA GONORRHOEA PCR, CHLAMYDIA TRACHOMATIS        PCR        Obtained today    (Z12.31) Encounter for screening mammogram for breast cancer  Comment: Order placed - had baseline several years ago  Plan: MA Screening Digital Bilateral              Darek Celeste MD

## 2022-01-27 LAB
C TRACH DNA SPEC QL NAA+PROBE: NEGATIVE
N GONORRHOEA DNA SPEC QL NAA+PROBE: NEGATIVE

## 2022-01-28 ENCOUNTER — ANCILLARY PROCEDURE (OUTPATIENT)
Dept: MAMMOGRAPHY | Facility: CLINIC | Age: 42
End: 2022-01-28
Attending: OBSTETRICS & GYNECOLOGY
Payer: COMMERCIAL

## 2022-01-28 DIAGNOSIS — Z12.31 ENCOUNTER FOR SCREENING MAMMOGRAM FOR BREAST CANCER: ICD-10-CM

## 2022-01-28 DIAGNOSIS — Z12.31 VISIT FOR SCREENING MAMMOGRAM: ICD-10-CM

## 2022-01-28 PROCEDURE — 77067 SCR MAMMO BI INCL CAD: CPT | Mod: TC | Performed by: RADIOLOGY

## 2022-01-28 PROCEDURE — 77063 BREAST TOMOSYNTHESIS BI: CPT | Mod: TC | Performed by: RADIOLOGY

## 2022-01-31 LAB
BKR LAB AP GYN ADEQUACY: NORMAL
BKR LAB AP GYN INTERPRETATION: NORMAL
BKR LAB AP HPV REFLEX: NORMAL
BKR LAB AP LMP: NORMAL
BKR LAB AP PREVIOUS ABNL DX: NORMAL
BKR LAB AP PREVIOUS ABNORMAL: NORMAL
PATH REPORT.COMMENTS IMP SPEC: NORMAL
PATH REPORT.COMMENTS IMP SPEC: NORMAL
PATH REPORT.RELEVANT HX SPEC: NORMAL

## 2022-02-01 LAB
HUMAN PAPILLOMA VIRUS 16 DNA: NEGATIVE
HUMAN PAPILLOMA VIRUS 18 DNA: NEGATIVE
HUMAN PAPILLOMA VIRUS FINAL DIAGNOSIS: ABNORMAL
HUMAN PAPILLOMA VIRUS OTHER HR: POSITIVE

## 2022-02-02 ENCOUNTER — PATIENT OUTREACH (OUTPATIENT)
Dept: OBGYN | Facility: CLINIC | Age: 42
End: 2022-02-02

## 2022-04-25 ENCOUNTER — MYC REFILL (OUTPATIENT)
Dept: INTERNAL MEDICINE | Facility: CLINIC | Age: 42
End: 2022-04-25

## 2022-04-25 DIAGNOSIS — F41.1 GENERALIZED ANXIETY DISORDER: ICD-10-CM

## 2022-04-26 RX ORDER — LORAZEPAM 0.5 MG/1
TABLET ORAL
Qty: 30 TABLET | Refills: 0 | Status: SHIPPED | OUTPATIENT
Start: 2022-04-26 | End: 2022-07-12

## 2022-06-02 DIAGNOSIS — I10 HTN, GOAL BELOW 140/90: ICD-10-CM

## 2022-06-05 DIAGNOSIS — I10 HTN, GOAL BELOW 140/90: ICD-10-CM

## 2022-06-06 RX ORDER — ATENOLOL 25 MG/1
TABLET ORAL
Qty: 90 TABLET | Refills: 1 | Status: SHIPPED | OUTPATIENT
Start: 2022-06-06 | End: 2023-06-22

## 2022-06-07 RX ORDER — LISINOPRIL 20 MG/1
TABLET ORAL
Qty: 180 TABLET | Refills: 1 | Status: SHIPPED | OUTPATIENT
Start: 2022-06-07 | End: 2023-06-22

## 2022-07-08 DIAGNOSIS — F41.1 GENERALIZED ANXIETY DISORDER: ICD-10-CM

## 2022-07-11 NOTE — TELEPHONE ENCOUNTER
Lorazepam 0.5 mg tab  Routing refill request to provider for review/approval because:  Drug not on the [facility] refill protocol

## 2022-07-12 RX ORDER — LORAZEPAM 0.5 MG/1
TABLET ORAL
Qty: 30 TABLET | Refills: 0 | Status: SHIPPED | OUTPATIENT
Start: 2022-07-12 | End: 2022-09-21

## 2022-07-28 ENCOUNTER — NURSE TRIAGE (OUTPATIENT)
Dept: NURSING | Facility: CLINIC | Age: 42
End: 2022-07-28

## 2022-07-29 NOTE — TELEPHONE ENCOUNTER
Pt calling for lab results. Unable to access pts lab results because she is [facility]. Pt stated she understood and will get the results in the morning.    VIDAL HOFF RN      Reason for Disposition    Health Information question, no triage required and triager able to answer question    Additional Information    Negative: [1] Caller is not with the adult (patient) AND [2] reporting urgent symptoms    Negative: Lab result questions    Negative: Medication questions    Negative: Caller can't be reached by phone    Negative: Caller has already spoken to PCP or another triager    Negative: RN needs further essential information from caller in order to complete triage    Negative: Requesting regular office appointment    Negative: [1] Caller requesting NON-URGENT health information AND [2] PCP's office is the best resource    Protocols used: INFORMATION ONLY CALL - NO TRIAGE-A-

## 2022-09-21 ENCOUNTER — MYC REFILL (OUTPATIENT)
Dept: INTERNAL MEDICINE | Facility: CLINIC | Age: 42
End: 2022-09-21

## 2022-09-21 DIAGNOSIS — F41.1 GENERALIZED ANXIETY DISORDER: ICD-10-CM

## 2022-09-23 RX ORDER — LORAZEPAM 0.5 MG/1
TABLET ORAL
Qty: 30 TABLET | Refills: 0 | Status: SHIPPED | OUTPATIENT
Start: 2022-09-23 | End: 2023-01-09

## 2022-10-29 ENCOUNTER — HEALTH MAINTENANCE LETTER (OUTPATIENT)
Age: 42
End: 2022-10-29

## 2023-01-09 ENCOUNTER — MYC REFILL (OUTPATIENT)
Dept: INTERNAL MEDICINE | Facility: CLINIC | Age: 43
End: 2023-01-09

## 2023-01-09 DIAGNOSIS — F41.1 GENERALIZED ANXIETY DISORDER: ICD-10-CM

## 2023-01-10 ENCOUNTER — PATIENT OUTREACH (OUTPATIENT)
Dept: OBGYN | Facility: CLINIC | Age: 43
End: 2023-01-10
Payer: COMMERCIAL

## 2023-01-10 DIAGNOSIS — B97.7 HIGH RISK HPV INFECTION: ICD-10-CM

## 2023-01-11 RX ORDER — LORAZEPAM 0.5 MG/1
TABLET ORAL
Qty: 30 TABLET | Refills: 0 | Status: SHIPPED | OUTPATIENT
Start: 2023-01-11 | End: 2023-04-20

## 2023-02-09 ENCOUNTER — OFFICE VISIT (OUTPATIENT)
Dept: SLEEP MEDICINE | Facility: CLINIC | Age: 43
End: 2023-02-09
Payer: COMMERCIAL

## 2023-02-09 VITALS
BODY MASS INDEX: 24.17 KG/M2 | SYSTOLIC BLOOD PRESSURE: 112 MMHG | DIASTOLIC BLOOD PRESSURE: 75 MMHG | WEIGHT: 154 LBS | HEART RATE: 80 BPM | OXYGEN SATURATION: 99 % | HEIGHT: 67 IN

## 2023-02-09 DIAGNOSIS — G47.33 OSA (OBSTRUCTIVE SLEEP APNEA): Primary | ICD-10-CM

## 2023-02-09 PROCEDURE — 99203 OFFICE O/P NEW LOW 30 MIN: CPT | Performed by: INTERNAL MEDICINE

## 2023-02-09 NOTE — PROGRESS NOTES
Sleep Consultation:    Date on this visit: 2/9/2023    Tati Hamilton  is referred by No ref. provider found for a sleep consultation.     Primary Physician: Gely Le     Tati Hamilton presents to clinic for management of obstructive sleep apnea.     Patient's split-night PSG from 8/21/2015 at a weight of 196 pounds, showed an apnea-hypopnea index of 44.4/h.The supine AHI was 65.3 events per hour.  The RERA index was 9.1 events per hour.  The RDI was 53.5 events per hour. Baseline oxygen saturation was 96.2%. Lowest oxygen saturation was 82.6%.  Time spent below 89% was 3.8 minutes. The optimal pressure was 8 cmH2O with an AHI of - events per hour.  Time in REM supine on final pressure was 17.0 minutes.     Patient was started on auto CPAP therapy with pressure range of 7 to 15 cm H2O following the sleep study.  Patient reports that it took a while for her to fully tolerate CPAP, but she benefited from therapy.  When her Nevin Respironics device was placed on recall from the , she is stopped using CPAP.    Her weight is down to 154 pounds.  However her sleep apnea symptoms remain.    Tati does snore frequently. She does not have witnessed apneas.Patient sleeps on her side and stomach. She has frequent morning dry mouth, denies no morning headaches and restless legs. Tati denies any sleep walking, dream enactment, sleep paralysis and hypnogogic/hypnopompic hallucinations.    Tati goes to sleep at 11:00 PM during the week. She wakes up at 7:00 AM. She falls asleep in 60 minutes.  Tati has difficulty falling asleep.  She wakes up 1-2 times a night for 10 minutes before falling back to sleep.  Tati wakes up to go to the bathroom and uncertain reasons.  On weekends, Tati goes to sleep at 11:00 PM.  She wakes up at 9:00 AM. She falls asleep in 60 minutes.  Patient gets an average of 7 hours of sleep per night.     Patient's [place] Sleepiness score  "10/24 consistent with mild daytime sleepiness.      Tati naps 1-2 times per week for 30-60 minutes, feels refreshed after naps. She takes some inadvertant naps.  She denies closing eyes, dozing and falling asleep while driving.  Patient was counseled on the importance of driving while alert, to pull over if drowsy, or nap before getting into the vehicle if sleepy.      She uses 2 cups/day of coffee. Last caffeine intake is usually before 4 PM.    Past Medical/Surgical History:  Past Medical History:   Diagnosis Date     Anxiety      Calculus of kidney      Depressive disorder      Depressive disorder, not elsewhere classified      ESTEPHANIA (generalised anxiety disorder)      High risk HPV infection 3/20/15,10/17/16    Not HPV 16 or 18, HR, Not 16 or 18     History of colposcopy 08/05/2020     HTN, goal below 140/90      Lung nodules 05/2010    Needs f/u CT Nov 2010     Multiple sclerosis (H) Dx in 2001     Multiple sclerosis (H)      PONV (postoperative nausea and vomiting)      Vitamin D deficiencies      Social History     Tobacco Use     Smoking status: Every Day     Packs/day: 0.50     Years: 4.00     Pack years: 2.00     Types: Cigarettes     Smokeless tobacco: Never     Tobacco comments:     socially   Vaping Use     Vaping Use: Never used   Substance Use Topics     Alcohol use: Yes     Alcohol/week: 0.0 standard drinks     Comment: occasional     Drug use: No     Physical Examination:  Vitals: /75   Pulse 80   Ht 1.702 m (5' 7\")   Wt 69.9 kg (154 lb)   SpO2 99%   BMI 24.12 kg/m    BMI= Body mass index is 24.12 kg/m .  GENERAL APPEARANCE: healthy, alert and no distress  HENT: oropharynx crowded  NEURO: mentation intact and speech normal  PSYCH: mentation appears normal and affect normal/bright  Mallampati Class: IV.  Tonsillar Stage: 1  hidden by pillars.    Impression/Plan:    1.  Obstructive sleep apnea  2.  Sleepiness    Patient was diagnosed with severe obstructive sleep apnea in 2015 with a " baseline apnea-hypopnea index of 44.4/h at a weight of 184 pounds.  She was prescribed auto titrating CPAP therapy with pressure range of 7 to 15 cm H2O, and had benefited from therapy.  When her Nevin Respironics CPAP device was recalled by the , she stopped using therapy.  Although her weight is down to 154 pounds, she continues to have significant sleep apnea symptoms and daytime sleepiness.  Weight loss can mean reduction in sleep apnea severity.  However, considering her symptomatic burden, patient wants to start treatment for sleep apnea rather than retesting.  We reviewed therapy options.  Patient opts to restart CPAP therapy.    Plan:     1. Start auto titrating PAP therapy with pressure range of 5-15 cm H2O       Obstructive sleep apnea reviewed.  Complications of untreated sleep apnea were reviewed.    I spent a total of 40 minutes for this appointment on this date of service which include time spent before, during and after the visit for chart review, patient care, counseling and coordination of care.    Dr. Chavo Jesus       CC: No ref. provider found

## 2023-02-09 NOTE — NURSING NOTE
"Chief Complaint   Patient presents with     Sleep Problem     Re-establish care, New machine       Initial /75   Pulse 80   Ht 1.702 m (5' 7\")   Wt 69.9 kg (154 lb)   SpO2 99%   BMI 24.12 kg/m   Estimated body mass index is 24.12 kg/m  as calculated from the following:    Height as of this encounter: 1.702 m (5' 7\").    Weight as of this encounter: 69.9 kg (154 lb).    Medication Reconciliation: complete  ESS 4  Neck circumference: 36 centimeters.  Audrey Eckert MA  "

## 2023-02-22 ENCOUNTER — APPOINTMENT (OUTPATIENT)
Dept: URBAN - METROPOLITAN AREA CLINIC 266 | Age: 43
Setting detail: DERMATOLOGY
End: 2023-02-22

## 2023-02-27 ENCOUNTER — TELEPHONE (OUTPATIENT)
Dept: SLEEP MEDICINE | Facility: CLINIC | Age: 43
End: 2023-02-27
Payer: COMMERCIAL

## 2023-02-27 NOTE — TELEPHONE ENCOUNTER
CPAP pickup and mask consult scheduled on 3/2/23 at 2:30 pm in [facility] showroom.

## 2023-03-08 NOTE — TELEPHONE ENCOUNTER
Dr. Celeste,    Patient is lost to pap tracking follow-up. Attempts to contact pt have been made per reminder process and there has been no reply and/or no appt scheduled.  If you are wanting any additional contact attempts please send to your care team staff.     Oriana Rodriguez, RN  Pap Tracking     3/20/15 Pap NIL with Pos HR HPR but not HPV 16 or 18. Plan: cotest in 1 year.   10/17/16 DX NL pap, +HPV HR, pt. Is to schedule a colp.  1/9/17 colp done. No bx taken. Plan: pap in 6-12 months.   6/13/18 NIL, +HR HPV, not 16/18. Plan colp  11/2/18 [place]- No visible lesions, no Bx taken. Plan pap in 1 year (from last pap), due by 6/13/19 07/18/19 Patient is lost to pap tracking follow-up.   7/3/20 ASCUS, +HR HPV (not 16/18). Plan: colposcopy  8/5/20 [place] ECC: benign. Plan 1 year cotest  9/17/21 Lost to follow-up for pap tracking  1/25/22 NIL pap, + HR HPV (not 16/18). Plan 1 year cotest  02/02/22 Results and recommendation released to patient in [employer] - viewed by pt  1/10/2023 Reminder MyChart   2/9/2023 Reminder call - spoke to pt. She will call to schedule.   3/8/2023 Lost to follow-up for pap tracking

## 2023-04-02 ENCOUNTER — HEALTH MAINTENANCE LETTER (OUTPATIENT)
Age: 43
End: 2023-04-02

## 2023-04-04 ENCOUNTER — HOSPITAL ENCOUNTER (EMERGENCY)
Facility: CLINIC | Age: 43
Discharge: HOME | End: 2023-04-04
Attending: PHYSICIAN ASSISTANT | Admitting: PHYSICIAN ASSISTANT
Payer: COMMERCIAL

## 2023-04-04 ENCOUNTER — APPOINTMENT (OUTPATIENT)
Dept: CT IMAGING | Facility: CLINIC | Age: 43
End: 2023-04-04
Attending: PHYSICIAN ASSISTANT
Payer: COMMERCIAL

## 2023-04-04 VITALS
DIASTOLIC BLOOD PRESSURE: 75 MMHG | OXYGEN SATURATION: 99 % | RESPIRATION RATE: 18 BRPM | TEMPERATURE: 97 F | HEART RATE: 106 BPM | SYSTOLIC BLOOD PRESSURE: 109 MMHG
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
## 2023-04-04 DIAGNOSIS — R10.11 ABDOMINAL PAIN, RIGHT UPPER QUADRANT: ICD-10-CM

## 2023-04-04 DIAGNOSIS — R07.9 RIGHT-SIDED CHEST PAIN: ICD-10-CM

## 2023-04-04 LAB
ALBUMIN SERPL BCG-MCNC: 4.4 G/DL (ref 3.5–5.2)
ALP SERPL-CCNC: 115 U/L (ref 35–104)
ALT SERPL W P-5'-P-CCNC: 67 U/L (ref 10–35)
ANION GAP SERPL CALCULATED.3IONS-SCNC: 14 MMOL/L (ref 7–15)
AST SERPL W P-5'-P-CCNC: 78 U/L (ref 10–35)
BASOPHILS # BLD AUTO: 0 10E3/UL (ref 0–0.2)
BASOPHILS NFR BLD AUTO: 1 %
BILIRUB SERPL-MCNC: 1.9 MG/DL
BUN SERPL-MCNC: 11.3 MG/DL (ref 6–20)
CALCIUM SERPL-MCNC: 9.6 MG/DL (ref 8.6–10)
CHLORIDE SERPL-SCNC: 94 MMOL/L (ref 98–107)
CREAT SERPL-MCNC: 0.71 MG/DL (ref 0.51–0.95)
DEPRECATED HCO3 PLAS-SCNC: 26 MMOL/L (ref 22–29)
EOSINOPHIL # BLD AUTO: 0 10E3/UL (ref 0–0.7)
EOSINOPHIL NFR BLD AUTO: 0 %
ERYTHROCYTE [DISTWIDTH] IN BLOOD BY AUTOMATED COUNT: 15.5 % (ref 10–15)
GFR SERPL CREATININE-BSD FRML MDRD: >90 ML/MIN/1.73M2
GLUCOSE SERPL-MCNC: 111 MG/DL (ref 70–99)
HCG SERPL QL: NEGATIVE
HCT VFR BLD AUTO: 34.7 % (ref 35–47)
HGB BLD-MCNC: 12.2 G/DL (ref 11.7–15.7)
HOLD SPECIMEN: NORMAL
IMM GRANULOCYTES # BLD: 0 10E3/UL
IMM GRANULOCYTES NFR BLD: 0 %
LIPASE SERPL-CCNC: 21 U/L (ref 13–60)
LYMPHOCYTES # BLD AUTO: 2.3 10E3/UL (ref 0.8–5.3)
LYMPHOCYTES NFR BLD AUTO: 32 %
MCH RBC QN AUTO: 35.4 PG (ref 26.5–33)
MCHC RBC AUTO-ENTMCNC: 35.2 G/DL (ref 31.5–36.5)
MCV RBC AUTO: 101 FL (ref 78–100)
MONOCYTES # BLD AUTO: 0.5 10E3/UL (ref 0–1.3)
MONOCYTES NFR BLD AUTO: 7 %
NEUTROPHILS # BLD AUTO: 4.4 10E3/UL (ref 1.6–8.3)
NEUTROPHILS NFR BLD AUTO: 60 %
NRBC # BLD AUTO: 0 10E3/UL
NRBC BLD AUTO-RTO: 0 /100
PLATELET # BLD AUTO: 205 10E3/UL (ref 150–450)
POTASSIUM SERPL-SCNC: 3.7 MMOL/L (ref 3.4–5.3)
PROT SERPL-MCNC: 7.4 G/DL (ref 6.4–8.3)
RBC # BLD AUTO: 3.45 10E6/UL (ref 3.8–5.2)
SODIUM SERPL-SCNC: 134 MMOL/L (ref 136–145)
WBC # BLD AUTO: 7.3 10E3/UL (ref 4–11)

## 2023-04-04 PROCEDURE — 250N000009 HC RX 250: Performed by: PHYSICIAN ASSISTANT

## 2023-04-04 PROCEDURE — 250N000011 HC RX IP 250 OP 636: Performed by: PHYSICIAN ASSISTANT

## 2023-04-04 PROCEDURE — 96374 THER/PROPH/DIAG INJ IV PUSH: CPT | Mod: 59

## 2023-04-04 PROCEDURE — 74177 CT ABD & PELVIS W/CONTRAST: CPT

## 2023-04-04 PROCEDURE — 99285 EMERGENCY DEPT VISIT HI MDM: CPT | Mod: 25

## 2023-04-04 PROCEDURE — 85025 COMPLETE CBC W/AUTO DIFF WBC: CPT | Performed by: PHYSICIAN ASSISTANT

## 2023-04-04 PROCEDURE — 84703 CHORIONIC GONADOTROPIN ASSAY: CPT | Performed by: PHYSICIAN ASSISTANT

## 2023-04-04 PROCEDURE — 36415 COLL VENOUS BLD VENIPUNCTURE: CPT | Performed by: PHYSICIAN ASSISTANT

## 2023-04-04 PROCEDURE — 83690 ASSAY OF LIPASE: CPT | Performed by: PHYSICIAN ASSISTANT

## 2023-04-04 PROCEDURE — 96361 HYDRATE IV INFUSION ADD-ON: CPT

## 2023-04-04 PROCEDURE — 80053 COMPREHEN METABOLIC PANEL: CPT | Performed by: PHYSICIAN ASSISTANT

## 2023-04-04 PROCEDURE — 258N000003 HC RX IP 258 OP 636: Performed by: PHYSICIAN ASSISTANT

## 2023-04-04 RX ORDER — SODIUM CHLORIDE 9 MG/ML
INJECTION, SOLUTION INTRAVENOUS CONTINUOUS
Status: DISCONTINUED | OUTPATIENT
Start: 2023-04-04 | End: 2023-04-04 | Stop reason: HOSPADM

## 2023-04-04 RX ORDER — KETOROLAC TROMETHAMINE 15 MG/ML
15 INJECTION, SOLUTION INTRAMUSCULAR; INTRAVENOUS ONCE
Status: COMPLETED | OUTPATIENT
Start: 2023-04-04 | End: 2023-04-04

## 2023-04-04 RX ORDER — IOPAMIDOL 755 MG/ML
500 INJECTION, SOLUTION INTRAVASCULAR ONCE
Status: COMPLETED | OUTPATIENT
Start: 2023-04-04 | End: 2023-04-04

## 2023-04-04 RX ADMIN — SODIUM CHLORIDE 70 ML: 9 INJECTION, SOLUTION INTRAVENOUS at 19:13

## 2023-04-04 RX ADMIN — SODIUM CHLORIDE 1000 ML: 9 INJECTION, SOLUTION INTRAVENOUS at 17:58

## 2023-04-04 RX ADMIN — KETOROLAC TROMETHAMINE 15 MG: 15 INJECTION, SOLUTION INTRAMUSCULAR; INTRAVENOUS at 17:59

## 2023-04-04 RX ADMIN — IOPAMIDOL 80 ML: 755 INJECTION, SOLUTION INTRAVENOUS at 19:13

## 2023-04-04 ASSESSMENT — ENCOUNTER SYMPTOMS
ABDOMINAL PAIN: 0
NECK PAIN: 1
SHORTNESS OF BREATH: 0
DIZZINESS: 0
FLANK PAIN: 1
VOMITING: 0
NUMBNESS: 1
LIGHT-HEADEDNESS: 0
NAUSEA: 1
HEADACHES: 0

## 2023-04-04 ASSESSMENT — ACTIVITIES OF DAILY LIVING (ADL): ADLS_ACUITY_SCORE: 35

## 2023-04-04 NOTE — Clinical Note
Tati Hamilton was seen and treated in our emergency department on 4/4/2023.  She may return to work on 04/07/2023.  Off work Wed 4/5/23 and Thursday 4/6/23 if needed due to injury      If you have any questions or concerns, please don't hesitate to call.      Liza Braga PA-C
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
## 2023-04-05 NOTE — DISCHARGE INSTRUCTIONS
-Suspected your pain is due to soft tissue injury to your trunk. Your CT scan is reassuring against internal organ injury or bleeding.   -Your liver appears atrophied to the left lobe which is new compared to comparison CT.  This is likely related to your know liver steatosis.  Be sure to discuss this with your primary care provider at follow-up.

## 2023-04-20 ENCOUNTER — MYC REFILL (OUTPATIENT)
Dept: INTERNAL MEDICINE | Facility: CLINIC | Age: 43
End: 2023-04-20
Payer: COMMERCIAL

## 2023-04-20 DIAGNOSIS — I10 HTN, GOAL BELOW 140/90: ICD-10-CM

## 2023-04-20 DIAGNOSIS — F41.1 GENERALIZED ANXIETY DISORDER: ICD-10-CM

## 2023-04-21 DIAGNOSIS — I10 HTN, GOAL BELOW 140/90: ICD-10-CM

## 2023-04-26 RX ORDER — LISINOPRIL 40 MG/1
20 TABLET ORAL 2 TIMES DAILY
Qty: 90 TABLET | Refills: 0 | Status: SHIPPED | OUTPATIENT
Start: 2023-04-26 | End: 2023-06-22

## 2023-04-26 RX ORDER — LORAZEPAM 0.5 MG/1
TABLET ORAL
Qty: 30 TABLET | Refills: 0 | Status: SHIPPED | OUTPATIENT
Start: 2023-04-26 | End: 2023-07-21

## 2023-04-26 RX ORDER — LISINOPRIL 40 MG/1
TABLET ORAL
Qty: 90 TABLET | Refills: 0 | Status: SHIPPED | OUTPATIENT
Start: 2023-04-26 | End: 2023-06-22

## 2023-06-22 ENCOUNTER — OFFICE VISIT (OUTPATIENT)
Dept: INTERNAL MEDICINE | Facility: CLINIC | Age: 43
End: 2023-06-22
Payer: COMMERCIAL

## 2023-06-22 VITALS
DIASTOLIC BLOOD PRESSURE: 50 MMHG | BODY MASS INDEX: 23.98 KG/M2 | HEART RATE: 99 BPM | TEMPERATURE: 97.2 F | OXYGEN SATURATION: 99 % | RESPIRATION RATE: 18 BRPM | HEIGHT: 67 IN | WEIGHT: 152.8 LBS | SYSTOLIC BLOOD PRESSURE: 80 MMHG

## 2023-06-22 DIAGNOSIS — I10 HTN, GOAL BELOW 140/90: Primary | ICD-10-CM

## 2023-06-22 DIAGNOSIS — R53.83 OTHER FATIGUE: ICD-10-CM

## 2023-06-22 DIAGNOSIS — R79.89 LFTS ABNORMAL: ICD-10-CM

## 2023-06-22 PROBLEM — F10.90 CHRONIC ALCOHOL USE: Status: ACTIVE | Noted: 2023-06-22

## 2023-06-22 PROBLEM — N39.0 URINARY TRACT INFECTION: Status: ACTIVE | Noted: 2023-06-22

## 2023-06-22 PROBLEM — R79.9 ABNORMAL BLOOD CHEMISTRY LEVEL: Status: ACTIVE | Noted: 2022-07-27

## 2023-06-22 PROBLEM — K76.0 NONALCOHOLIC FATTY LIVER: Status: ACTIVE | Noted: 2022-07-27

## 2023-06-22 PROBLEM — K70.10 ACUTE ALCOHOLIC HEPATITIS (H): Status: ACTIVE | Noted: 2023-06-22

## 2023-06-22 PROBLEM — K29.20 ACUTE ALCOHOLIC GASTRITIS: Status: ACTIVE | Noted: 2023-06-22

## 2023-06-22 PROCEDURE — 99213 OFFICE O/P EST LOW 20 MIN: CPT | Performed by: INTERNAL MEDICINE

## 2023-06-22 RX ORDER — ATENOLOL 25 MG/1
25 TABLET ORAL DAILY
Qty: 90 TABLET | Refills: 1 | Status: SHIPPED | OUTPATIENT
Start: 2023-06-22 | End: 2023-10-25

## 2023-06-22 RX ORDER — LISINOPRIL 10 MG/1
10 TABLET ORAL DAILY
Qty: 90 TABLET | Refills: 1 | Status: SHIPPED | OUTPATIENT
Start: 2023-06-22 | End: 2023-12-29 | Stop reason: DRUGHIGH

## 2023-06-22 ASSESSMENT — PAIN SCALES - GENERAL: PAINLEVEL: NO PAIN (0)

## 2023-06-22 NOTE — PATIENT INSTRUCTIONS
Plan:  1. Decrease Lisinopril to 10 mg  2. Hold Atenolol for few days. Resume it if the blood pressure stays constantly above 125   3. Please make a lab appointment for non fasting labs  -- TSH

## 2023-06-22 NOTE — PROGRESS NOTES
"    Patient's instructions / PLAN:                                                        Plan:  1. Decrease Lisinopril to 10 mg  2. Hold Atenolol for few days. Resume it if the blood pressure stays constantly above 125   3. Please make a lab appointment for non fasting labs  -- TSH        ASSESSMENT & PLAN:                                                      (R53.83) Other fatigue  (primary encounter diagnosis)  Comment:   Plan: TSH with free T4 reflex            (I10) HTN, goal below 140/90  Comment: Controlled  But now low and lightheadedness   Plan: lisinopril (ZESTRIL) 10 MG tablet, atenolol         (TENORMIN) 25 MG tablet            (R79.89) LFTs abnormal  Comment: trend down  Plan: stay away from etoh       Chief Complaint:                                                      Follow up chronic medical problems        SUBJECTIVE:                                                    History of present illness     Feels lightheaded     ROS:                                                      ROS: negative for fever, chills, cough, wheezes, chest pain, shortness of breath, vomiting, abdominal pain, leg swelling     OBJECTIVE:                                                    Physical Exam :  BP 80/50  Pulse 99, temperature 97.2  F (36.2  C), temperature source Tympanic, resp. rate 18, height 1.702 m (5' 7\"), weight 69.3 kg (152 lb 12.8 oz), SpO2 99 %, not currently breastfeeding.   NAD, appears comfortable  Skin: no rashes   Neck: supple, no JVD, No thyroidmegaly. Lymph nodes nonpalpable cervical and supraclavicular.  Chest: clear to auscultation bilaterally, good respiratory effort  Heart: S1 S2, RRR, no mgr appreciated  Abdomen: soft, not tender,   Extremities: no edema,   Neurologic: A, Ox3, no focal signs appreciated    PMHx: reviewed  Past Medical History:   Diagnosis Date     Anxiety      Calculus of kidney      Depressive disorder      Depressive disorder, not elsewhere classified      ESTEPHANIA (generalised " anxiety disorder)      High risk HPV infection 3/20/15,10/17/16    Not HPV 16 or 18, HR, Not 16 or 18     History of colposcopy 08/05/2020     HTN, goal below 140/90      Lung nodules 05/2010    Needs f/u CT Nov 2010     Multiple sclerosis (H) Dx in 2001     Multiple sclerosis (H)      PONV (postoperative nausea and vomiting)      Vitamin D deficiencies       PSHx: reviewed  Past Surgical History:   Procedure Laterality Date     HC COLP CERVIX/UPPER VAGINA W LOOP ELEC BX CERVIX       HC REMOVAL OF TONSILS,<13 Y/O      Tonsils <12y.o.     LAPAROSCOPIC CHOLECYSTECTOMY  5/8/2014    Procedure: LAPAROSCOPIC CHOLECYSTECTOMY;  Surgeon: Sona Giraldo MD;  Location:  OR     [facility] NONSPECIFIC PROCEDURE      2 procedures for kidney stones        Meds: reviewed  Current Outpatient Medications   Medication Sig Dispense Refill     atenolol (TENORMIN) 25 MG tablet TAKE ONE TABLET BY MOUTH ONCE DAILY 90 tablet 1     famotidine (PEPCID) 20 MG tablet Take 1 tablet (20 mg) by mouth 2 times daily 180 tablet 3     lisinopril (ZESTRIL) 20 MG tablet TAKE ONE TABLET BY MOUTH TWICE A  tablet 1     lisinopril (ZESTRIL) 40 MG tablet Take 0.5 tablets (20 mg) by mouth 2 times daily 90 tablet 0     lisinopril (ZESTRIL) 40 MG tablet TAKE ONE-HALF TABLET BY MOUTH TWICE A DAY 90 tablet 0     LORazepam (ATIVAN) 0.5 MG tablet TAKE ONE TABLET BY MOUTH ONCE DAILY AS NEEDED FOR ANXIETY 30 tablet 0     order for DME Equipment being ordered: [employer] DREAM STATION WITH HH AND MIRAGE FX FOR HER NASAL MASK WITH CHINSTRAP.  PRESSURE 7-15 CM.       vitamin D2 (ERGOCALCIFEROL) 84625 units (1250 mcg) capsule Take 1 capsule (50,000 Units) by mouth once a week 12 capsule 1       Soc Hx: reviewed  Fam Hx: reviewed      Chart documentation was completed, in part, with [employer] voice-recognition software. Even though reviewed, some grammatical, spelling, and word errors may remain.      Gely Garcia MD  Internal Medicine

## 2023-07-21 ENCOUNTER — MYC REFILL (OUTPATIENT)
Dept: INTERNAL MEDICINE | Facility: CLINIC | Age: 43
End: 2023-07-21
Payer: COMMERCIAL

## 2023-07-21 DIAGNOSIS — F41.1 GENERALIZED ANXIETY DISORDER: ICD-10-CM

## 2023-07-26 RX ORDER — LORAZEPAM 0.5 MG/1
TABLET ORAL
Qty: 30 TABLET | Refills: 0 | Status: SHIPPED | OUTPATIENT
Start: 2023-07-26 | End: 2023-10-25

## 2023-10-17 ENCOUNTER — HOSPITAL ENCOUNTER (EMERGENCY)
Facility: CLINIC | Age: 43
Discharge: HOME | End: 2023-10-17
Attending: EMERGENCY MEDICINE | Admitting: EMERGENCY MEDICINE
Payer: COMMERCIAL

## 2023-10-17 ENCOUNTER — APPOINTMENT (OUTPATIENT)
Dept: CT IMAGING | Facility: CLINIC | Age: 43
End: 2023-10-17
Attending: EMERGENCY MEDICINE
Payer: COMMERCIAL

## 2023-10-17 VITALS
TEMPERATURE: 97.8 F | OXYGEN SATURATION: 98 % | SYSTOLIC BLOOD PRESSURE: 157 MMHG | HEART RATE: 80 BPM | RESPIRATION RATE: 20 BRPM | DIASTOLIC BLOOD PRESSURE: 102 MMHG

## 2023-10-17 DIAGNOSIS — E83.42 HYPOMAGNESEMIA: ICD-10-CM

## 2023-10-17 DIAGNOSIS — R07.9 CHEST PAIN, UNSPECIFIED TYPE: ICD-10-CM

## 2023-10-17 DIAGNOSIS — F10.10 ALCOHOL ABUSE: ICD-10-CM

## 2023-10-17 DIAGNOSIS — E87.6 HYPOKALEMIA: ICD-10-CM

## 2023-10-17 LAB
ALBUMIN SERPL BCG-MCNC: 4.4 G/DL (ref 3.5–5.2)
ALP SERPL-CCNC: 121 U/L (ref 35–104)
ALT SERPL W P-5'-P-CCNC: 69 U/L (ref 0–50)
ANION GAP SERPL CALCULATED.3IONS-SCNC: 15 MMOL/L (ref 7–15)
AST SERPL W P-5'-P-CCNC: 85 U/L (ref 0–45)
ATRIAL RATE - MUSE: 81 BPM
BASO+EOS+MONOS # BLD AUTO: NORMAL 10*3/UL
BASO+EOS+MONOS NFR BLD AUTO: NORMAL %
BASOPHILS # BLD AUTO: 0 10E3/UL (ref 0–0.2)
BASOPHILS NFR BLD AUTO: 0 %
BILIRUB SERPL-MCNC: 1.8 MG/DL
BUN SERPL-MCNC: 11.2 MG/DL (ref 6–20)
CALCIUM SERPL-MCNC: 9.3 MG/DL (ref 8.6–10)
CHLORIDE SERPL-SCNC: 97 MMOL/L (ref 98–107)
CREAT SERPL-MCNC: 0.61 MG/DL (ref 0.51–0.95)
D DIMER PPP FEU-MCNC: 0.79 UG/ML FEU (ref 0–0.5)
DEPRECATED HCO3 PLAS-SCNC: 23 MMOL/L (ref 22–29)
DIASTOLIC BLOOD PRESSURE - MUSE: NORMAL MMHG
EGFRCR SERPLBLD CKD-EPI 2021: >90 ML/MIN/1.73M2
EOSINOPHIL # BLD AUTO: 0.1 10E3/UL (ref 0–0.7)
EOSINOPHIL NFR BLD AUTO: 1 %
ERYTHROCYTE [DISTWIDTH] IN BLOOD BY AUTOMATED COUNT: 11.5 % (ref 10–15)
ETHANOL SERPL-MCNC: <0.01 G/DL
GLUCOSE SERPL-MCNC: 104 MG/DL (ref 70–99)
HCG SERPL QL: NEGATIVE
HCT VFR BLD AUTO: 36.1 % (ref 35–47)
HGB BLD-MCNC: 12.7 G/DL (ref 11.7–15.7)
HOLD SPECIMEN: NORMAL
HOLD SPECIMEN: NORMAL
IMM GRANULOCYTES # BLD: 0 10E3/UL
IMM GRANULOCYTES NFR BLD: 0 %
INTERPRETATION ECG - MUSE: NORMAL
LIPASE SERPL-CCNC: 21 U/L (ref 13–60)
LYMPHOCYTES # BLD AUTO: 2.1 10E3/UL (ref 0.8–5.3)
LYMPHOCYTES NFR BLD AUTO: 29 %
MAGNESIUM SERPL-MCNC: 1.4 MG/DL (ref 1.7–2.3)
MCH RBC QN AUTO: 32.6 PG (ref 26.5–33)
MCHC RBC AUTO-ENTMCNC: 35.2 G/DL (ref 31.5–36.5)
MCV RBC AUTO: 93 FL (ref 78–100)
MONOCYTES # BLD AUTO: 0.4 10E3/UL (ref 0–1.3)
MONOCYTES NFR BLD AUTO: 5 %
NEUTROPHILS # BLD AUTO: 4.8 10E3/UL (ref 1.6–8.3)
NEUTROPHILS NFR BLD AUTO: 65 %
NRBC # BLD AUTO: 0 10E3/UL
NRBC BLD AUTO-RTO: 0 /100
P AXIS - MUSE: 56 DEGREES
PLATELET # BLD AUTO: 154 10E3/UL (ref 150–450)
POTASSIUM SERPL-SCNC: 3.2 MMOL/L (ref 3.4–5.3)
PR INTERVAL - MUSE: 132 MS
PROT SERPL-MCNC: 7.1 G/DL (ref 6.4–8.3)
QRS DURATION - MUSE: 82 MS
QT - MUSE: 392 MS
QTC - MUSE: 455 MS
R AXIS - MUSE: 69 DEGREES
RBC # BLD AUTO: 3.9 10E6/UL (ref 3.8–5.2)
SODIUM SERPL-SCNC: 135 MMOL/L (ref 135–145)
SYSTOLIC BLOOD PRESSURE - MUSE: NORMAL MMHG
T AXIS - MUSE: 64 DEGREES
TROPONIN T SERPL HS-MCNC: 9 NG/L
VENTRICULAR RATE- MUSE: 81 BPM
WBC # BLD AUTO: 7.4 10E3/UL (ref 4–11)

## 2023-10-17 PROCEDURE — 83690 ASSAY OF LIPASE: CPT | Performed by: EMERGENCY MEDICINE

## 2023-10-17 PROCEDURE — 250N000013 HC RX MED GY IP 250 OP 250 PS 637: Performed by: EMERGENCY MEDICINE

## 2023-10-17 PROCEDURE — 96365 THER/PROPH/DIAG IV INF INIT: CPT | Mod: 59

## 2023-10-17 PROCEDURE — 36415 COLL VENOUS BLD VENIPUNCTURE: CPT | Performed by: EMERGENCY MEDICINE

## 2023-10-17 PROCEDURE — 250N000011 HC RX IP 250 OP 636: Mod: JZ | Performed by: EMERGENCY MEDICINE

## 2023-10-17 PROCEDURE — 84703 CHORIONIC GONADOTROPIN ASSAY: CPT | Performed by: EMERGENCY MEDICINE

## 2023-10-17 PROCEDURE — 80053 COMPREHEN METABOLIC PANEL: CPT | Performed by: EMERGENCY MEDICINE

## 2023-10-17 PROCEDURE — 82077 ASSAY SPEC XCP UR&BREATH IA: CPT | Performed by: EMERGENCY MEDICINE

## 2023-10-17 PROCEDURE — 85379 FIBRIN DEGRADATION QUANT: CPT | Performed by: EMERGENCY MEDICINE

## 2023-10-17 PROCEDURE — 258N000003 HC RX IP 258 OP 636: Performed by: EMERGENCY MEDICINE

## 2023-10-17 PROCEDURE — 93005 ELECTROCARDIOGRAM TRACING: CPT

## 2023-10-17 PROCEDURE — 84484 ASSAY OF TROPONIN QUANT: CPT | Performed by: EMERGENCY MEDICINE

## 2023-10-17 PROCEDURE — 250N000009 HC RX 250: Performed by: EMERGENCY MEDICINE

## 2023-10-17 PROCEDURE — 71275 CT ANGIOGRAPHY CHEST: CPT

## 2023-10-17 PROCEDURE — 99285 EMERGENCY DEPT VISIT HI MDM: CPT | Mod: 25

## 2023-10-17 PROCEDURE — 85025 COMPLETE CBC W/AUTO DIFF WBC: CPT | Performed by: EMERGENCY MEDICINE

## 2023-10-17 PROCEDURE — 83735 ASSAY OF MAGNESIUM: CPT | Performed by: EMERGENCY MEDICINE

## 2023-10-17 PROCEDURE — 96361 HYDRATE IV INFUSION ADD-ON: CPT

## 2023-10-17 RX ORDER — IOPAMIDOL 755 MG/ML
60 INJECTION, SOLUTION INTRAVASCULAR ONCE
Status: COMPLETED | OUTPATIENT
Start: 2023-10-17 | End: 2023-10-17

## 2023-10-17 RX ORDER — MAGNESIUM SULFATE HEPTAHYDRATE 40 MG/ML
2 INJECTION, SOLUTION INTRAVENOUS ONCE
Status: COMPLETED | OUTPATIENT
Start: 2023-10-17 | End: 2023-10-17

## 2023-10-17 RX ORDER — POTASSIUM CHLORIDE 1.5 G/1.58G
40 POWDER, FOR SOLUTION ORAL ONCE
Status: COMPLETED | OUTPATIENT
Start: 2023-10-17 | End: 2023-10-17

## 2023-10-17 RX ADMIN — POTASSIUM CHLORIDE FOR ORAL SOLUTION 40 MEQ: 1.5 POWDER, FOR SOLUTION ORAL at 15:13

## 2023-10-17 RX ADMIN — SODIUM CHLORIDE 75 ML: 9 INJECTION, SOLUTION INTRAVENOUS at 14:56

## 2023-10-17 RX ADMIN — IOPAMIDOL 60 ML: 755 INJECTION, SOLUTION INTRAVENOUS at 14:57

## 2023-10-17 RX ADMIN — SODIUM CHLORIDE, POTASSIUM CHLORIDE, SODIUM LACTATE AND CALCIUM CHLORIDE 1000 ML: 600; 310; 30; 20 INJECTION, SOLUTION INTRAVENOUS at 14:15

## 2023-10-17 RX ADMIN — MAGNESIUM SULFATE HEPTAHYDRATE 2 G: 2 INJECTION, SOLUTION INTRAVENOUS at 15:13

## 2023-10-17 ASSESSMENT — ACTIVITIES OF DAILY LIVING (ADL)
ADLS_ACUITY_SCORE: 35
ADLS_ACUITY_SCORE: 35
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
## 2023-10-17 NOTE — LETTER
October 17, 2023      To Whom It May Concern:      Tati Hamilton was seen in our Emergency Department today, 10/17/23.  I expect her condition to improve over the next 1-2 days.  She may return to work/school when improved.    Sincerely,        OLIVIA Oakes

## 2023-10-17 NOTE — ED TRIAGE NOTES
Pt here for SOB w/ exertion and intermittent chest pain. Was seen in the [facility] ED and found to have critically low potassium and magnesium. Advised to be admitted, but pt declined. Pt has been taking PO replacements. Last drink was Saturday - pt had been on a 5 day edwards after 4 months of sobriety. No hx of withdrawal.

## 2023-10-25 ENCOUNTER — OFFICE VISIT (OUTPATIENT)
Dept: INTERNAL MEDICINE | Facility: CLINIC | Age: 43
End: 2023-10-25
Payer: COMMERCIAL

## 2023-10-25 VITALS
DIASTOLIC BLOOD PRESSURE: 97 MMHG | RESPIRATION RATE: 18 BRPM | TEMPERATURE: 97 F | HEART RATE: 123 BPM | WEIGHT: 159.4 LBS | BODY MASS INDEX: 25.02 KG/M2 | HEIGHT: 67 IN | SYSTOLIC BLOOD PRESSURE: 153 MMHG | OXYGEN SATURATION: 98 %

## 2023-10-25 DIAGNOSIS — F41.1 GENERALIZED ANXIETY DISORDER: ICD-10-CM

## 2023-10-25 DIAGNOSIS — I10 HTN, GOAL BELOW 140/90: ICD-10-CM

## 2023-10-25 DIAGNOSIS — R00.2 PALPITATIONS: Primary | ICD-10-CM

## 2023-10-25 DIAGNOSIS — E83.42 HYPOMAGNESEMIA: ICD-10-CM

## 2023-10-25 DIAGNOSIS — E87.6 HYPOKALEMIA: ICD-10-CM

## 2023-10-25 PROBLEM — R53.1 WEAKNESS: Status: ACTIVE | Noted: 2023-10-25

## 2023-10-25 PROBLEM — R94.31 PROLONGED QT INTERVAL: Status: ACTIVE | Noted: 2023-10-25

## 2023-10-25 PROBLEM — F41.9 ANXIETY: Status: ACTIVE | Noted: 2023-10-25

## 2023-10-25 PROBLEM — K21.9 GERD (GASTROESOPHAGEAL REFLUX DISEASE): Status: ACTIVE | Noted: 2023-10-25

## 2023-10-25 PROBLEM — R11.10 VOMITING: Status: ACTIVE | Noted: 2023-10-25

## 2023-10-25 LAB
ANION GAP SERPL CALCULATED.3IONS-SCNC: 12 MMOL/L (ref 7–15)
BUN SERPL-MCNC: 15.3 MG/DL (ref 6–20)
CALCIUM SERPL-MCNC: 9.7 MG/DL (ref 8.6–10)
CHLORIDE SERPL-SCNC: 100 MMOL/L (ref 98–107)
CREAT SERPL-MCNC: 0.72 MG/DL (ref 0.51–0.95)
DEPRECATED HCO3 PLAS-SCNC: 23 MMOL/L (ref 22–29)
EGFRCR SERPLBLD CKD-EPI 2021: >90 ML/MIN/1.73M2
GLUCOSE SERPL-MCNC: 106 MG/DL (ref 70–99)
MAGNESIUM SERPL-MCNC: 2.1 MG/DL (ref 1.7–2.3)
POTASSIUM SERPL-SCNC: 4.1 MMOL/L (ref 3.4–5.3)
SODIUM SERPL-SCNC: 135 MMOL/L (ref 135–145)
TSH SERPL DL<=0.005 MIU/L-ACNC: 0.89 UIU/ML (ref 0.3–4.2)

## 2023-10-25 PROCEDURE — 99214 OFFICE O/P EST MOD 30 MIN: CPT | Performed by: INTERNAL MEDICINE

## 2023-10-25 PROCEDURE — 83735 ASSAY OF MAGNESIUM: CPT | Performed by: INTERNAL MEDICINE

## 2023-10-25 PROCEDURE — 84443 ASSAY THYROID STIM HORMONE: CPT | Performed by: INTERNAL MEDICINE

## 2023-10-25 PROCEDURE — 80048 BASIC METABOLIC PNL TOTAL CA: CPT | Performed by: INTERNAL MEDICINE

## 2023-10-25 PROCEDURE — 96127 BRIEF EMOTIONAL/BEHAV ASSMT: CPT | Performed by: INTERNAL MEDICINE

## 2023-10-25 PROCEDURE — 36415 COLL VENOUS BLD VENIPUNCTURE: CPT | Performed by: INTERNAL MEDICINE

## 2023-10-25 RX ORDER — METOPROLOL SUCCINATE 25 MG/1
25 TABLET, EXTENDED RELEASE ORAL 2 TIMES DAILY
Qty: 60 TABLET | Refills: 4 | Status: SHIPPED | OUTPATIENT
Start: 2023-10-25 | End: 2023-12-29 | Stop reason: DRUGHIGH

## 2023-10-25 RX ORDER — LORAZEPAM 0.5 MG/1
TABLET ORAL
Qty: 30 TABLET | Refills: 0 | Status: SHIPPED | OUTPATIENT
Start: 2023-10-25 | End: 2023-12-29

## 2023-10-25 ASSESSMENT — PAIN SCALES - GENERAL: PAINLEVEL: NO PAIN (0)

## 2023-10-25 ASSESSMENT — PATIENT HEALTH QUESTIONNAIRE - PHQ9
SUM OF ALL RESPONSES TO PHQ QUESTIONS 1-9: 8
SUM OF ALL RESPONSES TO PHQ QUESTIONS 1-9: 8
10. IF YOU CHECKED OFF ANY PROBLEMS, HOW DIFFICULT HAVE THESE PROBLEMS MADE IT FOR YOU TO DO YOUR WORK, TAKE CARE OF THINGS AT HOME, OR GET ALONG WITH OTHER PEOPLE: SOMEWHAT DIFFICULT

## 2023-10-25 ASSESSMENT — ANXIETY QUESTIONNAIRES
GAD7 TOTAL SCORE: 6
6. BECOMING EASILY ANNOYED OR IRRITABLE: SEVERAL DAYS
1. FEELING NERVOUS, ANXIOUS, OR ON EDGE: SEVERAL DAYS
2. NOT BEING ABLE TO STOP OR CONTROL WORRYING: SEVERAL DAYS
GAD7 TOTAL SCORE: 6
4. TROUBLE RELAXING: SEVERAL DAYS
5. BEING SO RESTLESS THAT IT IS HARD TO SIT STILL: NOT AT ALL
IF YOU CHECKED OFF ANY PROBLEMS ON THIS QUESTIONNAIRE, HOW DIFFICULT HAVE THESE PROBLEMS MADE IT FOR YOU TO DO YOUR WORK, TAKE CARE OF THINGS AT HOME, OR GET ALONG WITH OTHER PEOPLE: SOMEWHAT DIFFICULT
7. FEELING AFRAID AS IF SOMETHING AWFUL MIGHT HAPPEN: SEVERAL DAYS
3. WORRYING TOO MUCH ABOUT DIFFERENT THINGS: SEVERAL DAYS

## 2023-10-25 NOTE — PROGRESS NOTES
Dr Garcia's note      Patient's instructions / PLAN:                                                        Plan:  Echocardiogram   2.  Labs today - [address]   3. You need to take Folic acid 1 mg daily and thiamine 100 mg daily - during the drinking periods  4. Magnesium gluconate 200 - 400 mg at bed time   5. Add Metoprolol 25 mg twice a day -- hold if blood pressure is less than 110      ASSESSMENT & PLAN:                                                      (R00.2) Palpitations  (primary encounter diagnosis)  Comment: We discussed about the new meds, advantages and potential side effects. The patient will read also the info from the pharmacy and call back if questions.   Plan: Echocardiogram Complete, Basic metabolic panel,        Magnesium, TSH with free T4 reflex, metoprolol         succinate ER (TOPROL XL) 25 MG 24 hr tablet            (E87.6) Hypokalemia  Comment:   Plan: Basic metabolic panel, Magnesium, TSH with free        T4 reflex            (E83.42) Hypomagnesemia  Comment:   Plan: Basic metabolic panel, Magnesium, TSH with free        T4 reflex            (I10) HTN, goal below 140/90  Comment: Not controlled   Plan: metoprolol succinate ER (TOPROL XL) 25 MG 24 hr        tablet            (F41.1) Generalized anxiety disorder  Comment:   Plan: LORazepam (ATIVAN) 0.5 MG tablet                 Chief complaint:                                                    ER follow up     SUBJECTIVE:                                                    History of present illness:    Oct 17 ER note reviewed  -- chr serious alcohol abuse and dependence   -- low K @ 3.2 and low Mg @ 1.4  -- LFTs elevated  -- she states she was doing well but she relapsed. She is sure she can abstinence from alcohol. She is more concerned about the heart.       Palpitations  -- on fitbit 136 resting   -- she has been off Atenolol and the  leg edema improved     Subjective   Sharee is a 42 year old, presenting for the following health  "issues:  Patient is being seen for an ER FOLLOW UP.      Review of Systems:                                                      ROS: negative for fever, chills, cough, wheezes, chest pain, shortness of breath, vomiting, abdominal pain, leg swelling       OBJECTIVE:             Physical exam:  Blood pressure (!) 153/97, pulse (!) 123, temperature 97  F (36.1  C), temperature source Tympanic, resp. rate 18, height 1.702 m (5' 7\"), weight 72.3 kg (159 lb 6.4 oz), SpO2 98%, not currently breastfeeding.     NAD, appears comfortable  Skin: no rashes   Neck: supple, no JVD,  No thyroidmegaly. Lymph nodes nonpalpable cervical and supraclavicular.  Chest: clear to auscultation bilaterally, good respiratory effort  Heart: S1 S2, RRR, no mgr appreciated  Abdomen: soft, not tender,   Extremities: no edema,   Neurologic: A, Ox3, no focal signs appreciated    PMHx: reviewed  Past Medical History:   Diagnosis Date    Anxiety     Calculus of kidney     Depressive disorder     Depressive disorder, not elsewhere classified     ESTEPHANIA (generalised anxiety disorder)     High risk HPV infection 3/20/15,10/17/16    Not HPV 16 or 18, HR, Not 16 or 18    History of colposcopy 08/05/2020    HTN, goal below 140/90     Lung nodules 05/2010    Needs f/u CT Nov 2010    Multiple sclerosis (H) Dx in 2001    Multiple sclerosis (H)     PONV (postoperative nausea and vomiting)     Vitamin D deficiencies       PSHx: reviewed  Past Surgical History:   Procedure Laterality Date    HC COLP CERVIX/UPPER VAGINA W LOOP ELEC BX CERVIX      HC REMOVAL OF TONSILS,<11 Y/O      Tonsils <12y.o.    LAPAROSCOPIC CHOLECYSTECTOMY  5/8/2014    Procedure: LAPAROSCOPIC CHOLECYSTECTOMY;  Surgeon: Sona Giraldo MD;  Location:  OR    [facility] NONSPECIFIC PROCEDURE      2 procedures for kidney stones        Meds: reviewed  Current Outpatient Medications   Medication Sig Dispense Refill    atenolol (TENORMIN) 25 MG tablet Take 1 tablet (25 mg) by mouth daily 90 tablet " 1    famotidine (PEPCID) 20 MG tablet Take 1 tablet (20 mg) by mouth 2 times daily 180 tablet 3    lisinopril (ZESTRIL) 10 MG tablet Take 1 tablet (10 mg) by mouth daily 90 tablet 1    LORazepam (ATIVAN) 0.5 MG tablet TAKE ONE TABLET BY MOUTH ONCE DAILY AS NEEDED FOR ANXIETY 30 tablet 0    order for DME Equipment being ordered: RESP[employer]ICS DREAM STATION WITH HH AND MIRAGE FX FOR HER NASAL MASK WITH CHINSTRAP.  PRESSURE 7-15 CM.      vitamin D2 (ERGOCALCIFEROL) 37131 units (1250 mcg) capsule Take 1 capsule (50,000 Units) by mouth once a week 12 capsule 1       Soc Hx: reviewed  Fam Hx: reviewed      Chart documentation was completed, in part, with [employer] voice-recognition software. Even though reviewed, some grammatical, spelling, and word errors may remain.      Gely Garcia MD  Internal Medicine         Answers submitted by the patient for this visit:  Patient Health Questionnaire (Submitted on 10/25/2023)  If you checked off any problems, how difficult have these problems made it for you to do your work, take care of things at home, or get along with other people?: Somewhat difficult  PHQ9 TOTAL SCORE: 8  ESTEPHANIA-7 (Submitted on 10/25/2023)  ESTEPHANIA 7 TOTAL SCORE: 6

## 2023-10-25 NOTE — PATIENT INSTRUCTIONS
Plan:  Echocardiogram   2.  Labs today - [address]   3. You need to take Folic acid 1 mg daily and thiamine 100 mg daily - during the drinking periods  4. Magnesium gluconate 200 - 400 mg at bed time   5. Add Metoprolol 25 mg twice a day -- hold if blood pressure is less than 110

## 2023-10-25 NOTE — PROGRESS NOTES
.  Answers submitted by the patient for this visit:  Patient Health Questionnaire (Submitted on 10/25/2023)  If you checked off any problems, how difficult have these problems made it for you to do your work, take care of things at home, or get along with other people?: Somewhat difficult  PHQ9 TOTAL SCORE: 8  ESTEPHANIA-7 (Submitted on 10/25/2023)  ESTEPHANIA 7 TOTAL SCORE: 6

## 2023-11-16 ENCOUNTER — HOSPITAL ENCOUNTER (OUTPATIENT)
Dept: CARDIOLOGY | Facility: CLINIC | Age: 43
Discharge: HOME | End: 2023-11-16
Attending: INTERNAL MEDICINE | Admitting: INTERNAL MEDICINE
Payer: COMMERCIAL

## 2023-11-16 DIAGNOSIS — R00.2 PALPITATIONS: ICD-10-CM

## 2023-11-16 LAB — LVEF ECHO: NORMAL

## 2023-11-16 PROCEDURE — 93306 TTE W/DOPPLER COMPLETE: CPT | Mod: 26 | Performed by: INTERNAL MEDICINE

## 2023-11-16 PROCEDURE — 999N000208 ECHOCARDIOGRAM COMPLETE

## 2023-11-30 ENCOUNTER — VIRTUAL VISIT (OUTPATIENT)
Dept: FAMILY MEDICINE | Facility: CLINIC | Age: 43
End: 2023-11-30
Payer: COMMERCIAL

## 2023-11-30 ENCOUNTER — E-VISIT (OUTPATIENT)
Dept: URGENT CARE | Facility: CLINIC | Age: 43
End: 2023-11-30
Payer: COMMERCIAL

## 2023-11-30 DIAGNOSIS — U07.1 INFECTION DUE TO 2019 NOVEL CORONAVIRUS: Primary | ICD-10-CM

## 2023-11-30 PROCEDURE — 99207 PR NON-BILLABLE SERV PER CHARTING: CPT | Performed by: PHYSICIAN ASSISTANT

## 2023-11-30 NOTE — PROGRESS NOTES
"Sharee is a 43 year old who is being evaluated via a billable telephone visit.      What phone number would you like to be contacted at? [phone]  How would you like to obtain your AVS? Rolly    Distant Location (provider location):  On-site    Assessment & Plan     Infection due to 2019 novel coronavirus  Patient is a 43 YOF who presents to clinic due to symptoms of COVID-19 starting 5 day(s) ago with subsequent positive test results.  Patient is able to speak in full sentences-no signs of respiratory distress. Per CDC criteria, patient qualifies for prescribed treatment of COVID19 as patient meets high risk criteria. Discussed treatment options for COVID-19 as well as risks and benefits.  Patient elects to proceed with Paxlovid.  Discussed home medications and possible interactions.  Also discussed OTC options for managing symptoms of COVID-19.  Return and urgent/emergent follow-up precautions provided.    - nirmatrelvir and ritonavir (PAXLOVID) 300 mg/100 mg therapy pack; Take 3 tablets by mouth 2 times daily for 5 days (Take 2 Nirmatrelvir tablets and 1 Ritonavir tablet twice daily for 5 days)        COVID-19 positive patient.  Encounter for consideration of medication intervention. Patient does qualify for a prescription. Full discussion with patient including medication options, risks and benefits. Potential drug interactions reviewed with patient.     Treatment Planned  Paxlovid    Temporary change to home medications:  None     Estimated body mass index is 24.97 kg/m  as calculated from the following:    Height as of 10/25/23: 1.702 m (5' 7\").    Weight as of 10/25/23: 72.3 kg (159 lb 6.4 oz).  GFR Estimate   Date Value Ref Range Status   10/25/2023 >90 >60 mL/min/1.73m2 Final   01/15/2021 >90 >60 mL/min/[1.73_m2] Final     Comment:     Non  GFR Calc  Starting 12/18/2018, serum creatinine based estimated GFR (eGFR) will be   calculated using the Chronic Kidney Disease Epidemiology " Collaboration   (CKD-EPI) equation.       Lab Results   Component Value Date    [id number] Negative 10/08/2021       DESTINEE [facility] LILI Tolliver is a 43 year old, presenting for the following health issues:  Covid Concern        11/30/2023     1:25 PM   Additional Questions   Roomed by Norma DURAND MA   Accompanied by No one         11/30/2023     1:25 PM   Patient Reported Additional Medications   Patient reports taking the following new medications None     HPI   COVID-19 Symptom Review  How many days ago did these symptoms start? Saturday (5 Days Ago)  Tested positive Monday 11/27/2023  Are any of the following symptoms significant for you?  New or worsening difficulty breathing? No  Worsening cough? No  Fever or chills? No  Headache: No  Sore throat: YES  Chest pain: YES- When she breaths in she stated it feels like fiber glass  Diarrhea: No  Body aches? YES  What treatments has patient tried? Luz Villalbal    Does patient live in a nursing home, group home, or shelter? No  Does patient have a way to get food/medications during quarantined? Yes, I have a friend or family member who can help me.            Objective         Vitals:  No vitals were obtained today due to virtual visit.    Physical Exam   healthy, alert, and no distress  PSYCH: Alert and oriented times 3; coherent speech, normal   rate and volume, able to articulate logical thoughts, able   to abstract reason, no tangential thoughts, no hallucinations   or delusions  Her affect is normal  RESP: No cough, no audible wheezing, able to talk in full sentences  Remainder of exam unable to be completed due to telephone visits            Phone call duration: 8 minutes

## 2023-11-30 NOTE — PATIENT INSTRUCTIONS
"Tati Hamilton,    You tested positive for COVID-19. A PCR test is not needed, a home test is enough to be considered for COVID treatment. However, a  treatment visit is needed to discuss COVID treatments and the best option for you. Please schedule a virtual visit for COVID treatment. Log in to your [employer] and under the main menu, select \"schedule an appointment\" then \"COVID-19 treatment\" to make an appointment. You can also call [phone] and say \"COVID\" at the prompt.    You will not be charged for this e visit today.    Sumi Mattson PA-C  [facility]  "

## 2023-11-30 NOTE — LETTER
November 30, 2023      Tati Hamilton  [address] [address]        To Whom It May Concern:    Tati Hamilton  was seen on 11/30/23.  Please excuse her  until 12/4/23 due to illness.        Sincerely,        Rosalia Gallegos PA-C

## 2023-11-30 NOTE — PATIENT INSTRUCTIONS
Misbah Tolliver,     Based on your health history you do qualify for treatment of COVID-19.  For treatment you have been prescribed Paxlovid.  Paxlovid is a combined antiviral medication that reduces risk of hospitalization or severe COVID by 90%.  It is important that you  this medication and start it right away today.  The medication was sent to your pharmacy.    While taking Paxlovid, you can take your medications as prescribed.    You may continue to use Delsym to help manage symptoms.    If you develop any severe symptoms of medication reaction or COVID-19 including chest pain, coughing up blood, shortness of breath, swelling, rashes, or any other severe symptoms, please call 911/go to the emergency department.    Please reach out with any questions or concerns.  Take Care,  Rosalia Gallegos PA-C    COVID-19 Outpatient Treatments  Your care team can help you find the best treatments for COVID-19. Talk to a health care provider or refer to the FDA medicine fact sheets below.  Paxlovid (nirmatrelvir and ritonavir): https://www.paxlovid.AdMoment/resources  Molnupiravir (Lagevrio): https://www.fda.gov/media/504489/download  Important: We can only prescribe Paxlovid or Molnupiravir when it can be started within 5 days of first having symptoms.  Paxlovid (nimatrelvir and ritonavir)  How it works  Two medicines (nirmatrelvir and ritonavir) are taken together. They stop the virus from growing. Less amount of virus is easier for your body to fight.  Benefits  Lowers risk of a hospital stay or death from COVID-19.  How to take  Medicine comes in a daily container with both medicine tablets. Take by mouth twice daily (once in the morning, once at night) for 5 days.  The number of tablets to take varies by patient.  Don't chew or break capsules. Swallow whole.  When to take  Take it as soon as possible and within 5 days of your first symptoms.  Who can take it  Patients must be 12 years or older weigh at least 88 pounds. Paxlovid  is the preferred treatment for pregnant patients.  Possible side effects  Can cause altered sense of taste, diarrhea (loose, watery stools), high blood pressure, muscle aches.  Medicine conflicts  Some medicines may conflict with Paxlovid and may cause serious side effects.  Tell your care team about all the medicines you take, including prescription and over-the-counter medicines, vitamins, and herbal supplements.  Your care team will review your medicines to make sure that you can safely take Paxlovid.  Cautions  Paxlovid is not advised for patients with severe kidney or liver disease. If you have kidney or liver problems, the dose may need to be changed.  If you're pregnant or breastfeeding, talk to your care team about your options.  If you take hormonal birth control (such as the Pill), then you or your partner should also use a non-hormonal form of birth control (such as a condom). Keep doing this for 1 menstrual cycle after your last dose of Paxlovid.  Molnupiravir (lagevrio)  How it works  Stops the virus from growing. Less amount of virus is easier for your body to fight.  Benefits  Lowers risk of a hospital stay or death from COVID-19.  How to take  Take 4 capsules by mouth every 12 hours (4 in the morning and 4 at night) for 5 days. Don't chew or break capsules. Swallow whole.  When to take  Take as soon as possible and within 5 days of your first symptoms.  Who can take it  Patients must be 18 years or older.   Possible side effects  Diarrhea (loose, watery stools), nausea (feeling sick to your stomach), dizziness, headaches.  Medicine conflicts  Right now, there are no known conflicts with other drugs. But tell your care team about all medicines you take.  Cautions  This medicine is not advised for patients who are pregnant.  If you are someone who could become pregnant, use trusted birth control until 4 days after your last dose of molnupiravir.  If your partner could become pregnant, you should use  trusted birth control until 3 months after your last dose of molnupiravir.

## 2023-12-29 ENCOUNTER — OFFICE VISIT (OUTPATIENT)
Dept: INTERNAL MEDICINE | Facility: CLINIC | Age: 43
End: 2023-12-29
Payer: COMMERCIAL

## 2023-12-29 VITALS
DIASTOLIC BLOOD PRESSURE: 94 MMHG | HEART RATE: 88 BPM | RESPIRATION RATE: 18 BRPM | OXYGEN SATURATION: 96 % | HEIGHT: 67 IN | SYSTOLIC BLOOD PRESSURE: 148 MMHG | WEIGHT: 152.7 LBS | BODY MASS INDEX: 23.97 KG/M2 | TEMPERATURE: 97.7 F

## 2023-12-29 DIAGNOSIS — F41.1 GENERALIZED ANXIETY DISORDER: ICD-10-CM

## 2023-12-29 DIAGNOSIS — E83.42 HYPOMAGNESEMIA: ICD-10-CM

## 2023-12-29 DIAGNOSIS — R79.89 LFTS ABNORMAL: ICD-10-CM

## 2023-12-29 DIAGNOSIS — Z13.6 SCREENING FOR ISCHEMIC HEART DISEASE: ICD-10-CM

## 2023-12-29 DIAGNOSIS — I10 HTN, GOAL BELOW 140/90: Primary | ICD-10-CM

## 2023-12-29 PROCEDURE — 99214 OFFICE O/P EST MOD 30 MIN: CPT | Performed by: INTERNAL MEDICINE

## 2023-12-29 RX ORDER — LORAZEPAM 0.5 MG/1
TABLET ORAL
Qty: 30 TABLET | Refills: 0 | Status: SHIPPED | OUTPATIENT
Start: 2023-12-29 | End: 2024-04-01

## 2023-12-29 RX ORDER — METOPROLOL SUCCINATE 50 MG/1
50 TABLET, EXTENDED RELEASE ORAL DAILY
Qty: 90 TABLET | Refills: 1 | Status: SHIPPED | OUTPATIENT
Start: 2023-12-29 | End: 2024-09-20

## 2023-12-29 RX ORDER — LISINOPRIL 20 MG/1
20 TABLET ORAL 2 TIMES DAILY
Qty: 180 TABLET | Refills: 1 | Status: ON HOLD | OUTPATIENT
Start: 2023-12-29 | End: 2024-09-21

## 2023-12-29 ASSESSMENT — PAIN SCALES - GENERAL: PAINLEVEL: NO PAIN (0)

## 2023-12-29 NOTE — PATIENT INSTRUCTIONS
Plan:  Take Metoprolol 50 mg daily   2. Take Lisinopril 20 mg daily. If the blood pressure stays above 130/80, increase the Lisinopril to 40 mg daily  3. Please make a lab appointment for fasting labs    4. Schedule your ANNUAL EXAM

## 2023-12-29 NOTE — PROGRESS NOTES
Dr Garcia's note      Patient's instructions / PLAN:                                                        Plan:  Take Metoprolol 50 mg daily   2. Take Lisinopril 20 mg daily. If the blood pressure stays above 130/80, increase the Lisinopril to 40 mg daily  3. Please make a lab appointment for fasting labs    4. Schedule your ANNUAL EXAM     ASSESSMENT & PLAN:                                                      (I10) HTN, goal below 140/90  (primary encounter diagnosis)  Comment: Not controlled   Plan: metoprolol succinate ER (TOPROL XL) 50 MG 24 hr        tablet, lisinopril (ZESTRIL) 20 MG tablet, CBC         with platelets, Comprehensive metabolic panel,         Lipid panel reflex to direct LDL Fasting, TSH         with free T4 reflex, Albumin Random Urine         Quantitative with Creat Ratio, Magnesium            (R79.89) LFTs abnormal  Comment: Secondary alcohol, but she quit drinking  Plan: CBC with platelets, Comprehensive metabolic         panel, Lipid panel reflex to direct LDL         Fasting, TSH with free T4 reflex, Albumin         Random Urine Quantitative with Creat Ratio,         Magnesium            (E83.42) Hypomagnesemia  Comment: Secondary alcohol, but she quit drinking  Plan: CBC with platelets, Comprehensive metabolic         panel, Lipid panel reflex to direct LDL         Fasting, TSH with free T4 reflex, Albumin         Random Urine Quantitative with Creat Ratio,         Magnesium            (F41.1) Generalized anxiety disorder  Comment: Stable  Plan: LORazepam (ATIVAN) 0.5 MG tablet            (Z13.6) Screening for ischemic heart disease  Comment:   Plan: CBC with platelets, Comprehensive metabolic         panel, Lipid panel reflex to direct LDL         Fasting, TSH with free T4 reflex, Albumin         Random Urine Quantitative with Creat Ratio,         Magnesium               Chief complaint:                                                      HTN  Anxiety  Elevated LFTs    SUBJECTIVE:    "                                                 History of present illness:    HTN  -- home BP: 130s/90s  -- lisinopril 10, Metoprolol 25 bid     LFTs were elevated during her alcohol binge.  She quit drinking.  She has no GI symptoms    Subjective   Sharee is a 43 year old, presenting for the following health issues:  Patient is being seen for an medication check and follow up.      12/29/2023     7:42 AM   Additional Questions   Roomed by Elisabeth Kingston       HPI       Hypertension Follow-up    Do you check your blood pressure regularly outside of the clinic? Yes   Are you following a low salt diet? Yes  Are your blood pressures ever more than 140 on the top number (systolic) OR more   than 90 on the bottom number (diastolic), for example 140/90? Yes  How many servings of fruits and vegetables do you eat daily?  2-3  On average, how many sweetened beverages do you drink each day (Examples: soda, juice, sweet tea, etc.  Do NOT count diet or artificially sweetened beverages)?   1  How many days per week do you exercise enough to make your heart beat faster? 4  How many minutes a day do you exercise enough to make your heart beat faster? 20 - 29  How many days per week do you miss taking your medication? 0    Review of Systems:                                                      ROS: negative for fever, chills, cough, wheezes, chest pain, shortness of breath, vomiting, abdominal pain, leg swelling     OBJECTIVE:             Physical exam:  Blood pressure (!) 148/94, pulse 88, temperature 97.7  F (36.5  C), temperature source Tympanic, resp. rate 18, height 1.702 m (5' 7\"), weight 69.3 kg (152 lb 11.2 oz), last menstrual period 12/12/2023, SpO2 96%, not currently breastfeeding.     Neurologic: A, Ox3, no focal signs appreciated    PMHx: reviewed  Past Medical History:   Diagnosis Date    Anxiety     Calculus of kidney     Depressive disorder     Depressive disorder, not elsewhere classified     ESTEPHANIA (generalised anxiety " disorder)     High risk HPV infection 3/20/15,10/17/16    Not HPV 16 or 18, HR, Not 16 or 18    History of colposcopy 08/05/2020    HTN, goal below 140/90     Lung nodules 05/2010    Needs f/u CT Nov 2010    Multiple sclerosis (H) Dx in 2001    Multiple sclerosis (H)     PONV (postoperative nausea and vomiting)     Vitamin D deficiencies       PSHx: reviewed  Past Surgical History:   Procedure Laterality Date    HC COLP CERVIX/UPPER VAGINA W LOOP ELEC BX CERVIX      HC REMOVAL OF TONSILS,<11 Y/O      Tonsils <12y.o.    LAPAROSCOPIC CHOLECYSTECTOMY  5/8/2014    Procedure: LAPAROSCOPIC CHOLECYSTECTOMY;  Surgeon: Sona Giraldo MD;  Location:  OR    [facility] NONSPECIFIC PROCEDURE      2 procedures for kidney stones        Meds: reviewed  Current Outpatient Medications   Medication Sig Dispense Refill    famotidine (PEPCID) 20 MG tablet Take 1 tablet (20 mg) by mouth 2 times daily 180 tablet 3    lisinopril (ZESTRIL) 10 MG tablet Take 1 tablet (10 mg) by mouth daily 90 tablet 1    LORazepam (ATIVAN) 0.5 MG tablet TAKE ONE TABLET BY MOUTH ONCE DAILY AS NEEDED FOR ANXIETY 30 tablet 0    metoprolol succinate ER (TOPROL XL) 25 MG 24 hr tablet Take 1 tablet (25 mg) by mouth 2 times daily 60 tablet 4    order for DME Equipment being ordered: [employer] DREAM STATION WITH HH AND MIRAGE FX FOR HER NASAL MASK WITH CHINSTRAP.  PRESSURE 7-15 CM.      vitamin D2 (ERGOCALCIFEROL) 64968 units (1250 mcg) capsule Take 1 capsule (50,000 Units) by mouth once a week 12 capsule 1       Soc Hx: reviewed  Fam Hx: reviewed      Chart documentation was completed, in part, with [employer] voice-recognition software. Even though reviewed, some grammatical, spelling, and word errors may remain.      Gely Garcia MD  Internal Medicine

## 2024-03-07 ENCOUNTER — LAB (OUTPATIENT)
Dept: LAB | Facility: CLINIC | Age: 44
End: 2024-03-07
Payer: COMMERCIAL

## 2024-03-07 DIAGNOSIS — I10 HTN, GOAL BELOW 140/90: ICD-10-CM

## 2024-03-07 DIAGNOSIS — Z13.6 SCREENING FOR ISCHEMIC HEART DISEASE: ICD-10-CM

## 2024-03-07 DIAGNOSIS — E83.42 HYPOMAGNESEMIA: ICD-10-CM

## 2024-03-07 DIAGNOSIS — R79.89 LFTS ABNORMAL: ICD-10-CM

## 2024-03-07 LAB
ERYTHROCYTE [DISTWIDTH] IN BLOOD BY AUTOMATED COUNT: 12 % (ref 10–15)
HCT VFR BLD AUTO: 42.3 % (ref 35–47)
HGB BLD-MCNC: 14 G/DL (ref 11.7–15.7)
MCH RBC QN AUTO: 32 PG (ref 26.5–33)
MCHC RBC AUTO-ENTMCNC: 33.1 G/DL (ref 31.5–36.5)
MCV RBC AUTO: 97 FL (ref 78–100)
PLATELET # BLD AUTO: 324 10E3/UL (ref 150–450)
RBC # BLD AUTO: 4.38 10E6/UL (ref 3.8–5.2)
WBC # BLD AUTO: 9.1 10E3/UL (ref 4–11)

## 2024-03-07 PROCEDURE — 80061 LIPID PANEL: CPT

## 2024-03-07 PROCEDURE — 36415 COLL VENOUS BLD VENIPUNCTURE: CPT

## 2024-03-07 PROCEDURE — 84443 ASSAY THYROID STIM HORMONE: CPT

## 2024-03-07 PROCEDURE — 80053 COMPREHEN METABOLIC PANEL: CPT

## 2024-03-07 PROCEDURE — 85027 COMPLETE CBC AUTOMATED: CPT

## 2024-03-07 PROCEDURE — 83735 ASSAY OF MAGNESIUM: CPT

## 2024-03-07 PROCEDURE — 82043 UR ALBUMIN QUANTITATIVE: CPT

## 2024-03-07 PROCEDURE — 82570 ASSAY OF URINE CREATININE: CPT

## 2024-03-08 LAB
ALBUMIN SERPL BCG-MCNC: 4.6 G/DL (ref 3.5–5.2)
ALP SERPL-CCNC: 109 U/L (ref 40–150)
ALT SERPL W P-5'-P-CCNC: 17 U/L (ref 0–50)
ANION GAP SERPL CALCULATED.3IONS-SCNC: 11 MMOL/L (ref 7–15)
AST SERPL W P-5'-P-CCNC: 19 U/L (ref 0–45)
BILIRUB SERPL-MCNC: 0.6 MG/DL
BUN SERPL-MCNC: 14.4 MG/DL (ref 6–20)
CALCIUM SERPL-MCNC: 9.8 MG/DL (ref 8.6–10)
CHLORIDE SERPL-SCNC: 101 MMOL/L (ref 98–107)
CHOLEST SERPL-MCNC: 131 MG/DL
CREAT SERPL-MCNC: 0.76 MG/DL (ref 0.51–0.95)
CREAT UR-MCNC: 130 MG/DL
DEPRECATED HCO3 PLAS-SCNC: 23 MMOL/L (ref 22–29)
EGFRCR SERPLBLD CKD-EPI 2021: >90 ML/MIN/1.73M2
FASTING STATUS PATIENT QL REPORTED: YES
GLUCOSE SERPL-MCNC: 102 MG/DL (ref 70–99)
HDLC SERPL-MCNC: 53 MG/DL
LDLC SERPL CALC-MCNC: 68 MG/DL
MAGNESIUM SERPL-MCNC: 1.8 MG/DL (ref 1.7–2.3)
MICROALBUMIN UR-MCNC: <12 MG/L
MICROALBUMIN/CREAT UR: NORMAL MG/G{CREAT}
NONHDLC SERPL-MCNC: 78 MG/DL
POTASSIUM SERPL-SCNC: 4.3 MMOL/L (ref 3.4–5.3)
PROT SERPL-MCNC: 7.7 G/DL (ref 6.4–8.3)
SODIUM SERPL-SCNC: 135 MMOL/L (ref 135–145)
TRIGL SERPL-MCNC: 49 MG/DL
TSH SERPL DL<=0.005 MIU/L-ACNC: 0.86 UIU/ML (ref 0.3–4.2)

## 2024-03-30 ENCOUNTER — HEALTH MAINTENANCE LETTER (OUTPATIENT)
Age: 44
End: 2024-03-30

## 2024-04-01 ENCOUNTER — MYC REFILL (OUTPATIENT)
Dept: INTERNAL MEDICINE | Facility: CLINIC | Age: 44
End: 2024-04-01
Payer: COMMERCIAL

## 2024-04-01 DIAGNOSIS — F41.1 GENERALIZED ANXIETY DISORDER: ICD-10-CM

## 2024-04-04 RX ORDER — LORAZEPAM 0.5 MG/1
TABLET ORAL
Qty: 30 TABLET | Refills: 0 | Status: SHIPPED | OUTPATIENT
Start: 2024-04-04 | End: 2024-06-11

## 2024-05-22 ENCOUNTER — OFFICE VISIT (OUTPATIENT)
Dept: OBGYN | Facility: CLINIC | Age: 44
End: 2024-05-22
Payer: COMMERCIAL

## 2024-05-22 VITALS — DIASTOLIC BLOOD PRESSURE: 80 MMHG | WEIGHT: 158 LBS | BODY MASS INDEX: 24.75 KG/M2 | SYSTOLIC BLOOD PRESSURE: 124 MMHG

## 2024-05-22 DIAGNOSIS — Z01.419 ENCOUNTER FOR GYNECOLOGICAL EXAMINATION WITHOUT ABNORMAL FINDING: Primary | ICD-10-CM

## 2024-05-22 DIAGNOSIS — Z12.4 SCREENING FOR MALIGNANT NEOPLASM OF CERVIX: ICD-10-CM

## 2024-05-22 DIAGNOSIS — Z12.31 ENCOUNTER FOR SCREENING MAMMOGRAM FOR BREAST CANCER: ICD-10-CM

## 2024-05-22 DIAGNOSIS — B97.7 HIGH RISK HPV INFECTION: ICD-10-CM

## 2024-05-22 PROCEDURE — 99396 PREV VISIT EST AGE 40-64: CPT | Performed by: OBSTETRICS & GYNECOLOGY

## 2024-05-22 PROCEDURE — 87624 HPV HI-RISK TYP POOLED RSLT: CPT | Performed by: OBSTETRICS & GYNECOLOGY

## 2024-05-22 PROCEDURE — G0145 SCR C/V CYTO,THINLAYER,RESCR: HCPCS | Performed by: OBSTETRICS & GYNECOLOGY

## 2024-05-22 NOTE — PROGRESS NOTES
SUBJECTIVE:                                                      Tati is a 43 year old  female who presents for annual exam.      Menses are regular q 28-30 days and oligomenorrheic. Using pull out method for contraception.  She is not currently considering pregnancy.  She is not presently sexually active  Besides routine health maintenance, she has no other health concerns today .  GYNECOLOGIC HISTORY:    Tati is not sexually active presently  History sexually transmitted infections:No STD history and HPV    History of abnormal Pap smear: Yes  3/20/15 Pap NIL with Pos HR HPR but not HPV 16 or 18. Plan: cotest in 1 year.   10/17/16 DX NL pap, +HPV HR, pt. Is to schedule a colp.  17 colp done. No bx taken. Plan: pap in 6-12 months.   18 NIL, +HR HPV, not 16/18. Plan colp  18 [place]- No visible lesions, no Bx taken. Plan pap in 1 year (from last pap), due by 19 Patient is lost to pap tracking follow-up.   7/3/20 ASCUS, +HR HPV (not 16/18). Plan: colposcopy  20 [place] ECC: benign. Plan 1 year cotest  21 Lost to follow-up for pap tracking  22 NIL pap, + HR HPV (not 16/18). Plan 1 year cotest  22 Results and recommendation released to patient in [facility] - viewed by pt  1/10/2023 Reminder MyChart   2023 Reminder call - spoke to pt. She will call to schedule.   3/8/2023 Lost to follow-up for pap tracking    Family history of breast CA: No  Family history of uterine/ovarian CA: No    Family history of colon CA: No      HISTORY:  OB History    Para Term  AB Living   2 1 1 0 1 1   SAB IAB Ectopic Multiple Live Births   1 0 0 0 1      # Outcome Date GA Lbr Santos/2nd Weight Sex Type Anes PTL Lv   2 Term 07 37w0d 12:00 3.005 kg (6 lb 10 oz) M  EPIDURAL  PATRICIA      Birth Comments: induced due to PIH      Name: Josias Mcadams SAB              Past Medical History:   Diagnosis Date    Anxiety     Calculus of kidney     Depressive disorder      Depressive disorder, not elsewhere classified     ESTEPHANIA (generalised anxiety disorder)     High risk HPV infection 3/20/15,10/17/16    Not HPV 16 or 18, HR, Not 16 or 18    History of colposcopy 08/05/2020    HTN, goal below 140/90     Lung nodules 05/2010    Needs f/u CT Nov 2010    Multiple sclerosis (H) Dx in 2001    Multiple sclerosis (H)     PONV (postoperative nausea and vomiting)     Vitamin D deficiencies      Past Surgical History:   Procedure Laterality Date    HC COLP CERVIX/UPPER VAGINA W LOOP ELEC BX CERVIX      HC REMOVAL OF TONSILS,<11 Y/O      Tonsils <12y.o.    LAPAROSCOPIC CHOLECYSTECTOMY  5/8/2014    Procedure: LAPAROSCOPIC CHOLECYSTECTOMY;  Surgeon: Sona Giraldo MD;  Location:  OR    [facility] NONSPECIFIC PROCEDURE      2 procedures for kidney stones     Family History   Problem Relation Age of Onset    Gallbladder Disease Mother     Other - See Comments Mother         varicose veins     Hypertension Father     Rectal Cancer Maternal Grandfather     Cerebrovascular Disease Maternal Grandmother     Heart Disease Paternal Grandfather      Social History     Socioeconomic History    Marital status:    Tobacco Use    Smoking status: Every Day     Current packs/day: 0.50     Average packs/day: 0.5 packs/day for 4.0 years (2.0 ttl pk-yrs)     Types: Cigarettes     Passive exposure: Current    Smokeless tobacco: Never    Tobacco comments:     socially   Vaping Use    Vaping status: Never Used   Substance and Sexual Activity    Alcohol use: Yes     Alcohol/week: 0.0 standard drinks of alcohol     Comment: occasional    Drug use: No    Sexual activity: Not Currently     Partners: Male     Birth control/protection: None, Other, Pull-out method     Social Determinants of Health     Financial Resource Strain: Low Risk  (10/25/2023)    Financial Resource Strain     Within the past 12 months, have you or your family members you live with been unable to get utilities (heat, electricity) when it was  really needed?: No   Food Insecurity: Low Risk  (10/25/2023)    Food Insecurity     Within the past 12 months, did you worry that your food would run out before you got money to buy more?: No     Within the past 12 months, did the food you bought just not last and you didn t have money to get more?: No   Transportation Needs: Low Risk  (10/25/2023)    Transportation Needs     Within the past 12 months, has lack of transportation kept you from medical appointments, getting your medicines, non-medical meetings or appointments, work, or from getting things that you need?: No   Interpersonal Safety: Low Risk  (12/29/2023)    Interpersonal Safety     Do you feel physically and emotionally safe where you currently live?: Yes     Within the past 12 months, have you been hit, slapped, kicked or otherwise physically hurt by someone?: No     Within the past 12 months, have you been humiliated or emotionally abused in other ways by your partner or ex-partner?: No   Housing Stability: Low Risk  (10/25/2023)    Housing Stability     Do you have housing? : Yes     Are you worried about losing your housing?: No       Current Outpatient Medications:     famotidine (PEPCID) 20 MG tablet, Take 1 tablet (20 mg) by mouth 2 times daily, Disp: 180 tablet, Rfl: 3    lisinopril (ZESTRIL) 20 MG tablet, Take 1 tablet (20 mg) by mouth 2 times daily, Disp: 180 tablet, Rfl: 1    LORazepam (ATIVAN) 0.5 MG tablet, TAKE ONE TABLET BY MOUTH ONCE DAILY AS NEEDED FOR ANXIETY, Disp: 30 tablet, Rfl: 0    metoprolol succinate ER (TOPROL XL) 50 MG 24 hr tablet, Take 1 tablet (50 mg) by mouth daily, Disp: 90 tablet, Rfl: 1    order for DME, Equipment being ordered: [employer] D[employer] STATION WITH HH AND MIRAGE FX FOR HER NASAL MASK WITH CHINSTRAP.  PRESSURE 7-15 CM., Disp: , Rfl:     vitamin D2 (ERGOCALCIFEROL) 01011 units (1250 mcg) capsule, Take 1 capsule (50,000 Units) by mouth once a week, Disp: 12 capsule, Rfl: 1     Allergies   Allergen Reactions     No Known Drug Allergy        Past medical, surgical, social and family history were reviewed and updated in EPIC.    ROS:   12 point review of systems negative other than symptoms noted below.      OBJECTIVE:                                                      EXAM:  There were no vitals taken for this visit.   BMI: There is no height or weight on file to calculate BMI.  General: Alert and oriented, no distress.  Psychiatric: Mood and affect within normal limits.  Skin: Warm and dry, no lesions, rashes or discolorations.  Breasts:  Symmetric, no skin changes.  No dominant masses bilaterally.   Lymph:  No cervical, supraclavicular, infraclavicular, axillary or inguinal lymphadenopathy palpable.   Abdomen: Soft, nontender, no hepatosplenomegaly, no rebound or guarding, no masses, no hernias.   Vulva:  No external lesions, normal female hair distribution, no inguinal adenopathy.    Urethra:  Midline, non-tender, well supported, no discharge  Vagina:  Well-estrogenized, no abnormal discharge, no lesions  Cervix: no lesions, no discharge, multiparous, and Pap obtained  Uterus:  anteverted, smooth contour, without enlargement, mobile, and without tenderness  Ovaries:  No masses appreciated, non-tender, mobile  Rectal Exam: deferred  Musculoskeletal: extremities normal      COUNSELING:   Reviewed preventive health counseling, as reflected in patient instructions   reports that she has been smoking cigarettes. She has a 2 pack-year smoking history. She has been exposed to tobacco smoke. She has never used smokeless tobacco.        ASSESSMENT/PLAN:                                                      43 year old female with satisfactory annual exam  (Z01.419) Encounter for gynecological examination without abnormal finding  (primary encounter diagnosis)  Comment:   Plan: *MA Screening Digital Bilateral, Gynecologic         Cytology (Pap) and HPV - Recommended Age 30-65         Years            (Z12.31) Encounter for  screening mammogram for breast cancer  Comment:   Plan: *MA Screening Digital Bilateral            (B97.7) High risk HPV infection  Comment:   Plan: Gynecologic Cytology (Pap) and HPV -         Recommended Age 30-65 Years            (Z12.4) Screening for malignant neoplasm of cervix  Comment:   Plan: Gynecologic Cytology (Pap) and HPV -         Recommended Age 30-65 Years            Darek Celeste MD

## 2024-05-23 LAB
HPV HR 12 DNA CVX QL NAA+PROBE: NEGATIVE
HPV16 DNA CVX QL NAA+PROBE: NEGATIVE
HPV18 DNA CVX QL NAA+PROBE: NEGATIVE
HUMAN PAPILLOMA VIRUS FINAL DIAGNOSIS: NORMAL

## 2024-05-29 LAB
BKR LAB AP GYN ADEQUACY: NORMAL
BKR LAB AP GYN INTERPRETATION: NORMAL
BKR LAB AP LMP: NORMAL
BKR LAB AP PREVIOUS ABNL DX: NORMAL
BKR LAB AP PREVIOUS ABNORMAL: NORMAL
PATH REPORT.COMMENTS IMP SPEC: NORMAL
PATH REPORT.COMMENTS IMP SPEC: NORMAL
PATH REPORT.RELEVANT HX SPEC: NORMAL

## 2024-06-03 ENCOUNTER — PATIENT OUTREACH (OUTPATIENT)
Dept: OBGYN | Facility: CLINIC | Age: 44
End: 2024-06-03
Payer: COMMERCIAL

## 2024-06-11 ENCOUNTER — VIRTUAL VISIT (OUTPATIENT)
Dept: INTERNAL MEDICINE | Facility: CLINIC | Age: 44
End: 2024-06-11
Payer: COMMERCIAL

## 2024-06-11 DIAGNOSIS — G47.9 SLEEP DISORDER: ICD-10-CM

## 2024-06-11 DIAGNOSIS — F41.1 GENERALIZED ANXIETY DISORDER: ICD-10-CM

## 2024-06-11 DIAGNOSIS — F41.9 ANXIETY: Primary | ICD-10-CM

## 2024-06-11 PROBLEM — K70.10 ACUTE ALCOHOLIC HEPATITIS (H): Status: RESOLVED | Noted: 2023-06-22 | Resolved: 2024-06-11

## 2024-06-11 PROBLEM — F10.90 CHRONIC ALCOHOL USE: Status: RESOLVED | Noted: 2023-06-22 | Resolved: 2024-06-11

## 2024-06-11 PROBLEM — R79.9 ABNORMAL BLOOD CHEMISTRY LEVEL: Status: RESOLVED | Noted: 2022-07-27 | Resolved: 2024-06-11

## 2024-06-11 PROBLEM — E87.6 ACUTE HYPOKALEMIA: Status: RESOLVED | Noted: 2023-10-25 | Resolved: 2024-06-11

## 2024-06-11 PROBLEM — K29.20 ACUTE ALCOHOLIC GASTRITIS: Status: RESOLVED | Noted: 2023-06-22 | Resolved: 2024-06-11

## 2024-06-11 PROBLEM — R11.10 VOMITING: Status: RESOLVED | Noted: 2023-10-25 | Resolved: 2024-06-11

## 2024-06-11 PROBLEM — N39.0 URINARY TRACT INFECTION: Status: RESOLVED | Noted: 2023-06-22 | Resolved: 2024-06-11

## 2024-06-11 PROCEDURE — 99214 OFFICE O/P EST MOD 30 MIN: CPT | Mod: 95 | Performed by: INTERNAL MEDICINE

## 2024-06-11 RX ORDER — HYDROXYZINE PAMOATE 25 MG/1
25-50 CAPSULE ORAL
Qty: 180 CAPSULE | Refills: 1 | Status: SHIPPED | OUTPATIENT
Start: 2024-06-11

## 2024-06-11 RX ORDER — LORAZEPAM 0.5 MG/1
TABLET ORAL
Qty: 30 TABLET | Refills: 0 | Status: SHIPPED | OUTPATIENT
Start: 2024-06-11 | End: 2024-08-18

## 2024-06-11 ASSESSMENT — PATIENT HEALTH QUESTIONNAIRE - PHQ9
SUM OF ALL RESPONSES TO PHQ QUESTIONS 1-9: 4
10. IF YOU CHECKED OFF ANY PROBLEMS, HOW DIFFICULT HAVE THESE PROBLEMS MADE IT FOR YOU TO DO YOUR WORK, TAKE CARE OF THINGS AT HOME, OR GET ALONG WITH OTHER PEOPLE: SOMEWHAT DIFFICULT
SUM OF ALL RESPONSES TO PHQ QUESTIONS 1-9: 4

## 2024-06-11 ASSESSMENT — ANXIETY QUESTIONNAIRES
8. IF YOU CHECKED OFF ANY PROBLEMS, HOW DIFFICULT HAVE THESE MADE IT FOR YOU TO DO YOUR WORK, TAKE CARE OF THINGS AT HOME, OR GET ALONG WITH OTHER PEOPLE?: SOMEWHAT DIFFICULT
3. WORRYING TOO MUCH ABOUT DIFFERENT THINGS: SEVERAL DAYS
7. FEELING AFRAID AS IF SOMETHING AWFUL MIGHT HAPPEN: SEVERAL DAYS
1. FEELING NERVOUS, ANXIOUS, OR ON EDGE: SEVERAL DAYS
GAD7 TOTAL SCORE: 7
4. TROUBLE RELAXING: MORE THAN HALF THE DAYS
GAD7 TOTAL SCORE: 7
2. NOT BEING ABLE TO STOP OR CONTROL WORRYING: SEVERAL DAYS
7. FEELING AFRAID AS IF SOMETHING AWFUL MIGHT HAPPEN: SEVERAL DAYS
5. BEING SO RESTLESS THAT IT IS HARD TO SIT STILL: NOT AT ALL
GAD7 TOTAL SCORE: 7
IF YOU CHECKED OFF ANY PROBLEMS ON THIS QUESTIONNAIRE, HOW DIFFICULT HAVE THESE PROBLEMS MADE IT FOR YOU TO DO YOUR WORK, TAKE CARE OF THINGS AT HOME, OR GET ALONG WITH OTHER PEOPLE: SOMEWHAT DIFFICULT
6. BECOMING EASILY ANNOYED OR IRRITABLE: SEVERAL DAYS

## 2024-06-11 NOTE — PROGRESS NOTES
Sharee is a 43 year old who is being evaluated via a billable video visit.    How would you like to obtain your AVS? Mail a copy  If the video visit is dropped, the invitation should be resent by: Text to cell phone: [phone]  Will anyone else be joining your video visit? No        This is a VIDEO ( using Doximity)  encounter with the patient.       Location of the provider : office   Location of the patient : home      17:02 --- 17:22      Dr Garcia's note        ASSESSMENT & PLAN:                                                      (F41.9) Anxiety  (primary encounter diagnosis)  (G47.9) Sleep disorder  Comment: We discussed about the new meds, advantages and potential side effects. The patient will read also the info from the pharmacy and call back if questions.   Plan: hydrOXYzine luis enrique (VISTARIL) 25 MG capsule            (F41.1) Generalized anxiety disorder  Comment:   Plan: LORazepam (ATIVAN) 0.5 MG tablet                 Chief complaint:                                                      Anxiety at bed time    SUBJECTIVE:                                                    History of present illness:    She would like to take something at bed time to help with anxiety, so she can fall asleep        Subjective   Sharee is a 43 year old, presenting for the following health issues:  Recheck Medication      6/11/2024     8:57 AM   Additional Questions   Roomed by Elisabeth Kingston       Review of Systems:                                                      ROS: negative for fever, chills, cough, wheezes, chest pain, shortness of breath, vomiting, abdominal pain, leg swelling  Elevated 98      OBJECTIVE:         NAD leg does not do as needed all the time      Hide take hydroxyzine sleep do not do not do not try it at work first try to take it daily okay to do another 1 on Saturday on the Sunday when you do not work okay okay and then and then if you usually need to do to take it you know let people know will  help        General because they think that you are you will you are disrespect for something okay so I does see an overnight EEG if something is different from me today I took this and it happened currently at the end of the day because I I want to keep my job and I do not want to    PMHx: reviewed  Past Medical History:   Diagnosis Date    Anxiety     Calculus of kidney     Depressive disorder     Depressive disorder, not elsewhere classified     ESTEPHANIA (generalised anxiety disorder)     High risk HPV infection 3/20/15,10/17/16    Not HPV 16 or 18, HR, Not 16 or 18    History of colposcopy 08/05/2020    HTN, goal below 140/90     Lung nodules 05/2010    Needs f/u CT Nov 2010    Multiple sclerosis (H) Dx in 2001    Multiple sclerosis (H)     PONV (postoperative nausea and vomiting)     Vitamin D deficiencies       PSHx: reviewed  Past Surgical History:   Procedure Laterality Date    HC COLP CERVIX/UPPER VAGINA W LOOP ELEC BX CERVIX      HC REMOVAL OF TONSILS,<13 Y/O      Tonsils <12y.o.    LAPAROSCOPIC CHOLECYSTECTOMY  5/8/2014    Procedure: LAPAROSCOPIC CHOLECYSTECTOMY;  Surgeon: Sona Giraldo MD;  Location:  OR    [facility] NONSPECIFIC PROCEDURE      2 procedures for kidney stones        Meds: reviewed  Current Outpatient Medications   Medication Sig Dispense Refill    famotidine (PEPCID) 20 MG tablet Take 1 tablet (20 mg) by mouth 2 times daily 180 tablet 3    lisinopril (ZESTRIL) 20 MG tablet Take 1 tablet (20 mg) by mouth 2 times daily 180 tablet 1    LORazepam (ATIVAN) 0.5 MG tablet TAKE ONE TABLET BY MOUTH ONCE DAILY AS NEEDED FOR ANXIETY 30 tablet 0    metoprolol succinate ER (TOPROL XL) 50 MG 24 hr tablet Take 1 tablet (50 mg) by mouth daily 90 tablet 1    order for DME Equipment being ordered: [employer] D[employer] WITH HH AND MIRAGE FX FOR HER NASAL MASK WITH CHINSTRAP.  PRESSURE 7-15 CM.      vitamin D2 (ERGOCALCIFEROL) 68996 units (1250 mcg) capsule Take 1 capsule (50,000 Units) by mouth once a  week 12 capsule 1       Soc Hx: reviewed  Fam Hx: reviewed      Chart documentation was completed, in part, with Retevo voice-recognition software. Even though reviewed, some grammatical, spelling, and word errors may remain.      Gely Garcia MD  Internal Medicine         Signed Electronically by: Gely Le MD    Answers submitted by the patient for this visit:  Patient Health Questionnaire (Submitted on 6/11/2024)  If you checked off any problems, how difficult have these problems made it for you to do your work, take care of things at home, or get along with other people?: Somewhat difficult  PHQ9 TOTAL SCORE: 4  ESTEPHANIA-7 (Submitted on 6/11/2024)  ESTEPHANIA 7 TOTAL SCORE: 7  Depression / Anxiety Questionnaire (Submitted on 6/11/2024)  Chief Complaint: Chronic problems general questions HPI Form  Depression/Anxiety: Anxiety  Anxiety only (Submitted on 6/11/2024)  Chief Complaint: Chronic problems general questions HPI Form  Anxiety since last: : medium  Other associated symotome: : No  Significant life event: : No  Anxious:: Yes  Current substance use:: No  General Questionnaire (Submitted on 6/11/2024)  Chief Complaint: Chronic problems general questions HPI Form  How many servings of fruits and vegetables do you eat daily?: 2-3  On average, how many sweetened beverages do you drink each day (Examples: soda, juice, sweet tea, etc.  Do NOT count diet or artificially sweetened beverages)?: 1  How many minutes a day do you exercise enough to make your heart beat faster?: 10 to 19  How many days a week do you exercise enough to make your heart beat faster?: 3 or less  How many days per week do you miss taking your medication?: 0

## 2024-07-26 ENCOUNTER — PATIENT OUTREACH (OUTPATIENT)
Dept: CARE COORDINATION | Facility: CLINIC | Age: 44
End: 2024-07-26
Payer: COMMERCIAL

## 2024-08-01 ENCOUNTER — E-VISIT (OUTPATIENT)
Dept: FAMILY MEDICINE | Facility: CLINIC | Age: 44
End: 2024-08-01
Payer: COMMERCIAL

## 2024-08-01 DIAGNOSIS — F41.1 GENERALIZED ANXIETY DISORDER: Primary | ICD-10-CM

## 2024-08-01 PROCEDURE — 99422 OL DIG E/M SVC 11-20 MIN: CPT | Performed by: INTERNAL MEDICINE

## 2024-08-01 ASSESSMENT — ANXIETY QUESTIONNAIRES
5. BEING SO RESTLESS THAT IT IS HARD TO SIT STILL: NOT AT ALL
6. BECOMING EASILY ANNOYED OR IRRITABLE: MORE THAN HALF THE DAYS
3. WORRYING TOO MUCH ABOUT DIFFERENT THINGS: MORE THAN HALF THE DAYS
7. FEELING AFRAID AS IF SOMETHING AWFUL MIGHT HAPPEN: SEVERAL DAYS
GAD7 TOTAL SCORE: 14
4. TROUBLE RELAXING: NEARLY EVERY DAY
2. NOT BEING ABLE TO STOP OR CONTROL WORRYING: NEARLY EVERY DAY
1. FEELING NERVOUS, ANXIOUS, OR ON EDGE: NEARLY EVERY DAY
GAD7 TOTAL SCORE: 14
8. IF YOU CHECKED OFF ANY PROBLEMS, HOW DIFFICULT HAVE THESE MADE IT FOR YOU TO DO YOUR WORK, TAKE CARE OF THINGS AT HOME, OR GET ALONG WITH OTHER PEOPLE?: EXTREMELY DIFFICULT
GAD7 TOTAL SCORE: 14
IF YOU CHECKED OFF ANY PROBLEMS ON THIS QUESTIONNAIRE, HOW DIFFICULT HAVE THESE PROBLEMS MADE IT FOR YOU TO DO YOUR WORK, TAKE CARE OF THINGS AT HOME, OR GET ALONG WITH OTHER PEOPLE: EXTREMELY DIFFICULT
7. FEELING AFRAID AS IF SOMETHING AWFUL MIGHT HAPPEN: SEVERAL DAYS

## 2024-08-01 ASSESSMENT — PATIENT HEALTH QUESTIONNAIRE - PHQ9
10. IF YOU CHECKED OFF ANY PROBLEMS, HOW DIFFICULT HAVE THESE PROBLEMS MADE IT FOR YOU TO DO YOUR WORK, TAKE CARE OF THINGS AT HOME, OR GET ALONG WITH OTHER PEOPLE: EXTREMELY DIFFICULT
SUM OF ALL RESPONSES TO PHQ QUESTIONS 1-9: 15
SUM OF ALL RESPONSES TO PHQ QUESTIONS 1-9: 15

## 2024-08-02 RX ORDER — BUSPIRONE HYDROCHLORIDE 5 MG/1
5-10 TABLET ORAL 3 TIMES DAILY
Qty: 90 TABLET | Refills: 2 | Status: SHIPPED | OUTPATIENT
Start: 2024-08-02 | End: 2024-08-14

## 2024-08-02 ASSESSMENT — PATIENT HEALTH QUESTIONNAIRE - PHQ9: SUM OF ALL RESPONSES TO PHQ QUESTIONS 1-9: 15

## 2024-08-02 NOTE — TELEPHONE ENCOUNTER
I talked with the patient on the phone with anxiety and depression  She prefers to stay away from SSRI at this time  She wakes up at night with panic attack  We discussed the buspirone.  Rx done.  She will try with half tablet of 5 mg and slowly she may increase it to 10 mg 3 times a day if she needs to take this dose      Provider E-Visit time total (minutes): 12

## 2024-08-02 NOTE — TELEPHONE ENCOUNTER
Provider E-Visit time total (minutes): ***    {Please send feedback regarding eVisits to [email or web address]

## 2024-08-14 DIAGNOSIS — F41.1 GENERALIZED ANXIETY DISORDER: ICD-10-CM

## 2024-08-15 RX ORDER — BUSPIRONE HYDROCHLORIDE 5 MG/1
5-10 TABLET ORAL 3 TIMES DAILY
Qty: 90 TABLET | Refills: 2 | Status: SHIPPED | OUTPATIENT
Start: 2024-08-15 | End: 2024-09-04

## 2024-08-18 ENCOUNTER — MYC REFILL (OUTPATIENT)
Dept: INTERNAL MEDICINE | Facility: CLINIC | Age: 44
End: 2024-08-18
Payer: COMMERCIAL

## 2024-08-18 DIAGNOSIS — F41.1 GENERALIZED ANXIETY DISORDER: ICD-10-CM

## 2024-08-20 RX ORDER — LORAZEPAM 0.5 MG/1
TABLET ORAL
Qty: 30 TABLET | Refills: 0 | Status: SHIPPED | OUTPATIENT
Start: 2024-08-20

## 2024-09-04 DIAGNOSIS — F41.1 GENERALIZED ANXIETY DISORDER: ICD-10-CM

## 2024-09-04 RX ORDER — BUSPIRONE HYDROCHLORIDE 5 MG/1
5-10 TABLET ORAL 3 TIMES DAILY
Qty: 90 TABLET | Refills: 2 | Status: SHIPPED | OUTPATIENT
Start: 2024-09-04

## 2024-09-14 ENCOUNTER — HOSPITAL ENCOUNTER (EMERGENCY)
Facility: CLINIC | Age: 44
Discharge: HOME | End: 2024-09-15
Attending: EMERGENCY MEDICINE | Admitting: EMERGENCY MEDICINE
Payer: COMMERCIAL

## 2024-09-14 DIAGNOSIS — F10.920 ALCOHOL INTOXICATION, UNCOMPLICATED (H): ICD-10-CM

## 2024-09-14 DIAGNOSIS — R11.2 NAUSEA AND VOMITING, UNSPECIFIED VOMITING TYPE: ICD-10-CM

## 2024-09-14 LAB
ALBUMIN SERPL BCG-MCNC: 4.4 G/DL (ref 3.5–5.2)
ALP SERPL-CCNC: 148 U/L (ref 40–150)
ALT SERPL W P-5'-P-CCNC: 18 U/L (ref 0–50)
ANION GAP SERPL CALCULATED.3IONS-SCNC: 22 MMOL/L (ref 7–15)
AST SERPL W P-5'-P-CCNC: 35 U/L (ref 0–45)
BASOPHILS # BLD AUTO: 0 10E3/UL (ref 0–0.2)
BASOPHILS NFR BLD AUTO: 0 %
BILIRUB SERPL-MCNC: 0.7 MG/DL
BUN SERPL-MCNC: 12.1 MG/DL (ref 6–20)
CALCIUM SERPL-MCNC: 8.7 MG/DL (ref 8.8–10.4)
CHLORIDE SERPL-SCNC: 96 MMOL/L (ref 98–107)
CREAT SERPL-MCNC: 0.72 MG/DL (ref 0.51–0.95)
EGFRCR SERPLBLD CKD-EPI 2021: >90 ML/MIN/1.73M2
EOSINOPHIL # BLD AUTO: 0 10E3/UL (ref 0–0.7)
EOSINOPHIL NFR BLD AUTO: 0 %
ERYTHROCYTE [DISTWIDTH] IN BLOOD BY AUTOMATED COUNT: 13.6 % (ref 10–15)
ETHANOL SERPL-MCNC: 0.2 G/DL
GLUCOSE SERPL-MCNC: 98 MG/DL (ref 70–99)
HCG SERPL QL: NEGATIVE
HCO3 SERPL-SCNC: 21 MMOL/L (ref 22–29)
HCT VFR BLD AUTO: 37.1 % (ref 35–47)
HGB BLD-MCNC: 13 G/DL (ref 11.7–15.7)
HOLD SPECIMEN: NORMAL
IMM GRANULOCYTES # BLD: 0 10E3/UL
IMM GRANULOCYTES NFR BLD: 1 %
LIPASE SERPL-CCNC: 17 U/L (ref 13–60)
LYMPHOCYTES # BLD AUTO: 3.1 10E3/UL (ref 0.8–5.3)
LYMPHOCYTES NFR BLD AUTO: 39 %
MAGNESIUM SERPL-MCNC: 1.8 MG/DL (ref 1.7–2.3)
MCH RBC QN AUTO: 32 PG (ref 26.5–33)
MCHC RBC AUTO-ENTMCNC: 35 G/DL (ref 31.5–36.5)
MCV RBC AUTO: 91 FL (ref 78–100)
MONOCYTES # BLD AUTO: 0.4 10E3/UL (ref 0–1.3)
MONOCYTES NFR BLD AUTO: 5 %
NEUTROPHILS # BLD AUTO: 4.4 10E3/UL (ref 1.6–8.3)
NEUTROPHILS NFR BLD AUTO: 55 %
NRBC # BLD AUTO: 0 10E3/UL
NRBC BLD AUTO-RTO: 0 /100
PLATELET # BLD AUTO: 209 10E3/UL (ref 150–450)
POTASSIUM SERPL-SCNC: 3.5 MMOL/L (ref 3.4–5.3)
PROT SERPL-MCNC: 7.3 G/DL (ref 6.4–8.3)
RBC # BLD AUTO: 4.06 10E6/UL (ref 3.8–5.2)
SODIUM SERPL-SCNC: 139 MMOL/L (ref 135–145)
WBC # BLD AUTO: 7.9 10E3/UL (ref 4–11)

## 2024-09-14 PROCEDURE — 250N000011 HC RX IP 250 OP 636: Performed by: EMERGENCY MEDICINE

## 2024-09-14 PROCEDURE — 96374 THER/PROPH/DIAG INJ IV PUSH: CPT

## 2024-09-14 PROCEDURE — 250N000013 HC RX MED GY IP 250 OP 250 PS 637: Performed by: EMERGENCY MEDICINE

## 2024-09-14 PROCEDURE — 82077 ASSAY SPEC XCP UR&BREATH IA: CPT | Performed by: EMERGENCY MEDICINE

## 2024-09-14 PROCEDURE — 258N000003 HC RX IP 258 OP 636: Performed by: EMERGENCY MEDICINE

## 2024-09-14 PROCEDURE — 99284 EMERGENCY DEPT VISIT MOD MDM: CPT | Mod: 25

## 2024-09-14 PROCEDURE — 93005 ELECTROCARDIOGRAM TRACING: CPT

## 2024-09-14 PROCEDURE — 83690 ASSAY OF LIPASE: CPT | Performed by: EMERGENCY MEDICINE

## 2024-09-14 PROCEDURE — 84703 CHORIONIC GONADOTROPIN ASSAY: CPT | Performed by: EMERGENCY MEDICINE

## 2024-09-14 PROCEDURE — 96361 HYDRATE IV INFUSION ADD-ON: CPT

## 2024-09-14 PROCEDURE — 80053 COMPREHEN METABOLIC PANEL: CPT | Performed by: EMERGENCY MEDICINE

## 2024-09-14 PROCEDURE — 36415 COLL VENOUS BLD VENIPUNCTURE: CPT | Performed by: EMERGENCY MEDICINE

## 2024-09-14 PROCEDURE — 85025 COMPLETE CBC W/AUTO DIFF WBC: CPT | Performed by: EMERGENCY MEDICINE

## 2024-09-14 PROCEDURE — 83735 ASSAY OF MAGNESIUM: CPT | Performed by: EMERGENCY MEDICINE

## 2024-09-14 RX ORDER — LORAZEPAM 0.5 MG/1
1 TABLET ORAL ONCE
Status: COMPLETED | OUTPATIENT
Start: 2024-09-14 | End: 2024-09-14

## 2024-09-14 RX ORDER — ONDANSETRON 2 MG/ML
4 INJECTION INTRAMUSCULAR; INTRAVENOUS
Status: DISCONTINUED | OUTPATIENT
Start: 2024-09-14 | End: 2024-09-15 | Stop reason: HOSPADM

## 2024-09-14 RX ORDER — MULTIPLE VITAMINS W/ MINERALS TAB 9MG-400MCG
1 TAB ORAL ONCE
Status: COMPLETED | OUTPATIENT
Start: 2024-09-14 | End: 2024-09-14

## 2024-09-14 RX ORDER — FOLIC ACID 1 MG/1
1 TABLET ORAL ONCE
Status: COMPLETED | OUTPATIENT
Start: 2024-09-14 | End: 2024-09-14

## 2024-09-14 RX ADMIN — THIAMINE HCL TAB 100 MG 100 MG: 100 TAB at 22:41

## 2024-09-14 RX ADMIN — ONDANSETRON 4 MG: 2 INJECTION INTRAMUSCULAR; INTRAVENOUS at 22:13

## 2024-09-14 RX ADMIN — FOLIC ACID 1 MG: 1 TABLET ORAL at 22:41

## 2024-09-14 RX ADMIN — SODIUM CHLORIDE 1000 ML: 9 INJECTION, SOLUTION INTRAVENOUS at 22:13

## 2024-09-14 RX ADMIN — LORAZEPAM 1 MG: 0.5 TABLET ORAL at 22:41

## 2024-09-14 RX ADMIN — Medication 1 TABLET: at 22:41

## 2024-09-14 ASSESSMENT — LIFESTYLE VARIABLES
TREMOR: 2
HEADACHE, FULLNESS IN HEAD: NOT PRESENT
ORIENTATION AND CLOUDING OF SENSORIUM: ORIENTED AND CAN DO SERIAL ADDITIONS
NAUSEA AND VOMITING: INTERMITTENT NAUSEA WITH DRY HEAVES
VISUAL DISTURBANCES: NOT PRESENT
TOTAL SCORE: 11
ANXIETY: 5
PAROXYSMAL SWEATS: NO SWEAT VISIBLE
AGITATION: NORMAL ACTIVITY
AUDITORY DISTURBANCES: NOT PRESENT

## 2024-09-14 ASSESSMENT — COLUMBIA-SUICIDE SEVERITY RATING SCALE - C-SSRS
5. HAVE YOU STARTED TO WORK OUT OR WORKED OUT THE DETAILS OF HOW TO KILL YOURSELF? DO YOU INTEND TO CARRY OUT THIS PLAN?: NO
3. HAVE YOU BEEN THINKING ABOUT HOW YOU MIGHT KILL YOURSELF?: NO
1. IN THE PAST MONTH, HAVE YOU WISHED YOU WERE DEAD OR WISHED YOU COULD GO TO SLEEP AND NOT WAKE UP?: YES
6. HAVE YOU EVER DONE ANYTHING, STARTED TO DO ANYTHING, OR PREPARED TO DO ANYTHING TO END YOUR LIFE?: NO
2. HAVE YOU ACTUALLY HAD ANY THOUGHTS OF KILLING YOURSELF IN THE PAST MONTH?: YES
4. HAVE YOU HAD THESE THOUGHTS AND HAD SOME INTENTION OF ACTING ON THEM?: NO

## 2024-09-14 ASSESSMENT — ACTIVITIES OF DAILY LIVING (ADL)
ADLS_ACUITY_SCORE: 35
ADLS_ACUITY_SCORE: 35

## 2024-09-15 VITALS
OXYGEN SATURATION: 100 % | SYSTOLIC BLOOD PRESSURE: 144 MMHG | TEMPERATURE: 98.6 F | RESPIRATION RATE: 12 BRPM | HEART RATE: 102 BPM | DIASTOLIC BLOOD PRESSURE: 98 MMHG

## 2024-09-15 PROCEDURE — 250N000011 HC RX IP 250 OP 636: Performed by: EMERGENCY MEDICINE

## 2024-09-15 PROCEDURE — 96376 TX/PRO/DX INJ SAME DRUG ADON: CPT

## 2024-09-15 RX ORDER — LISINOPRIL 20 MG/1
20 TABLET ORAL DAILY
Qty: 3 TABLET | Refills: 0 | Status: SHIPPED | OUTPATIENT
Start: 2024-09-15 | End: 2024-09-15

## 2024-09-15 RX ORDER — METOPROLOL SUCCINATE 50 MG/1
50 TABLET, EXTENDED RELEASE ORAL DAILY
Qty: 3 TABLET | Refills: 0 | Status: SHIPPED | OUTPATIENT
Start: 2024-09-15 | End: 2024-09-15

## 2024-09-15 RX ORDER — ONDANSETRON 4 MG/1
4 TABLET, ORALLY DISINTEGRATING ORAL EVERY 6 HOURS PRN
Qty: 10 TABLET | Refills: 0 | Status: SHIPPED | OUTPATIENT
Start: 2024-09-15 | End: 2024-09-20

## 2024-09-15 RX ADMIN — ONDANSETRON 4 MG: 2 INJECTION INTRAMUSCULAR; INTRAVENOUS at 00:59

## 2024-09-15 ASSESSMENT — ACTIVITIES OF DAILY LIVING (ADL)
ADLS_ACUITY_SCORE: 35
ADLS_ACUITY_SCORE: 35

## 2024-09-15 ASSESSMENT — LIFESTYLE VARIABLES
ORIENTATION AND CLOUDING OF SENSORIUM: ORIENTED AND CAN DO SERIAL ADDITIONS
NAUSEA AND VOMITING: INTERMITTENT NAUSEA WITH DRY HEAVES
ANXIETY: MILDLY ANXIOUS
AGITATION: NORMAL ACTIVITY
TREMOR: NO TREMOR
TOTAL SCORE: 5
VISUAL DISTURBANCES: NOT PRESENT
PAROXYSMAL SWEATS: NO SWEAT VISIBLE
HEADACHE, FULLNESS IN HEAD: NOT PRESENT
AUDITORY DISTURBANCES: NOT PRESENT
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
## 2024-09-15 NOTE — ED PROVIDER NOTES
Emergency Department Note      History of Present Illness     Chief Complaint   Withdrawal    HPI   Tati Hamilton is a 43 year old female with a history of hypertension who presents to the ER with concerns for alcohol withdrawl. Patient reports nausea and brain fog starting earlier today. She recalls that her last drink was yesterday and that she has had withdrawal before but not as bad as this. She states that her binge lasted for a week or two. Sharee denies drug use, suicidal ideation, thoughts of self harm, abdominal pain, black/bloody stool, chest pain, or shortness of breath.     Independent Historian   None    Review of External Notes   None.     Past Medical History     Medical History and Problem List   Anxiety  Calculus of kidney  Depressive disorder  ESTEPHANIA  Hypertension   Lung nodules  Multiple sclerosis   PONV   Alcohol hepatitis   GERD  CHARLES  Osteopenia   Steatohepatitis     Medications   Buspirone   Famotidine   Hydroxyzine   Lisinopril  Lorazepam   Metoprolol     Surgical History   Upper vagina W loop  Tonsillectomy   Cholecystectomy  2 procedures for kidney stones  Physical Exam     Patient Vitals for the past 24 hrs:   BP Temp Temp src Pulse Resp SpO2   09/14/24 2245 (!) 141/98 -- -- 110 12 100 %   09/14/24 2230 (!) 146/98 -- -- 98 15 --   09/14/24 2215 (!) 128/90 -- -- 100 28 --   09/14/24 2200 (!) 135/93 -- -- 105 -- --   09/14/24 2150 (!) 143/93 -- -- -- -- --   09/14/24 2147 (!) 143/93 -- -- -- -- --   09/14/24 2145 -- 98.6  F (37  C) Oral 109 20 99 %     Physical Exam  General: Alert, no acute distress  HEENT:  Moist mucous membranes.  Conjunctiva normal. No tongue fasiculations.   CV:  RRR, no m/r/g, skin warm and well perfused  Pulm:  CTAB, no wheezes/ronchi/rales.  No acute distress, breathing comfortably  GI:  Soft, nontender, nondistended.  No rebound or guarding.    MSK:  Moving all extremities.  No focal areas of edema, erythema  Skin:  WWP, no rashes, skin color normal, no  diaphoresis  Psych:  Well-appearing, normal affect, regular speech; denies SI/HI    Diagnostics     Lab Results   Labs Ordered and Resulted from Time of ED Arrival to Time of ED Departure   COMPREHENSIVE METABOLIC PANEL - Abnormal       Result Value    Sodium 139      Potassium 3.5      Carbon Dioxide (CO2) 21 (*)     Anion Gap 22 (*)     Urea Nitrogen 12.1      Creatinine 0.72      GFR Estimate >90      Calcium 8.7 (*)     Chloride 96 (*)     Glucose 98      Alkaline Phosphatase 148      AST 35      ALT 18      Protein Total 7.3      Albumin 4.4      Bilirubin Total 0.7     ETHYL ALCOHOL LEVEL - Abnormal    Alcohol ethyl 0.20 (*)    MAGNESIUM - Normal    Magnesium 1.8     LIPASE - Normal    Lipase 17     HCG QUALITATIVE PREGNANCY - Normal    hCG Serum Qualitative Negative     CBC WITH PLATELETS AND DIFFERENTIAL    WBC Count 7.9      RBC Count 4.06      Hemoglobin 13.0      Hematocrit 37.1      MCV 91      MCH 32.0      MCHC 35.0      RDW 13.6      Platelet Count 209      % Neutrophils 55      % Lymphocytes 39      % Monocytes 5      % Eosinophils 0      % Basophils 0      % Immature Granulocytes 1      NRBCs per 100 WBC 0      Absolute Neutrophils 4.4      Absolute Lymphocytes 3.1      Absolute Monocytes 0.4      Absolute Eosinophils 0.0      Absolute Basophils 0.0      Absolute Immature Granulocytes 0.0      Absolute NRBCs 0.0         Imaging   No orders to display       EKG   ECG taken at 2145, ECG read at 2153  Sinus tachycardia  Left posterior fascicular block    Rate 102 bpm. WY interval 208 ms. QRS duration 80 ms. QT/QTc 340/443 ms. P-R-T axes  131.     Independent Interpretation   None    ED Course      Medications Administered   Medications   ondansetron (ZOFRAN) injection 4 mg (4 mg Intravenous $Given 9/14/24 2213)   sodium chloride 0.9% BOLUS 1,000 mL (1,000 mLs Intravenous $New Bag 9/14/24 2213)   LORazepam (ATIVAN) tablet 1 mg (1 mg Oral $Given 9/14/24 2241)   thiamine (B-1) tablet 100 mg (100 mg  Oral $Given 9/14/24 2241)   folic acid (FOLVITE) tablet 1 mg (1 mg Oral $Given 9/14/24 2241)   multivitamin w/minerals (THERA-VIT-M) tablet 1 tablet (1 tablet Oral $Given 9/14/24 2241)       Discussion of Management   None    ED Course   ED Course as of 09/14/24 2339   Sat Sep 14, 2024   2201 I obtained the history and examined the patient as noted above.        Additional Documentation  None    Medical Decision Making / Diagnosis     CMS Diagnoses: None    MIPS       None    MDM   Tati Hamilton is a 43 year old female who presents to the ER for evaluation of concerns for alcohol withdrawal.  Please above for the details of HPI and exam.  Reports last drink was yesterday and reports history of withdrawals but no seizures.  She is afebrile vitally stable.  She is neurologically intact.  There is no signs of external head trauma.  She has no tongue fasciculations or tremors worrisome for alcohol withdrawal at this time.  She is not suicidal or homicidal.  She has a benign abdominal exam.  Lab studies show mild anion gap acidosis, likely mild alcoholic ketoacidosis.  BAL 0.2.  The rest of her lab studies are largely unremarkable.   She was given fluids and oral multivitamins.  Initially patient was interested in detox but eventually declined.  She was or in the ER for nearly 4 hours without signs of acute alcohol withdrawal.  With reasonable certainty, patient is safe to discharge home.  Recommend alcohol cessation.      Disposition   The patient was discharged.     Diagnosis     ICD-10-CM    1. Alcohol intoxication, uncomplicated (H24)  F10.920            Discharge Medications   New Prescriptions    No medications on file         Scribe Disclosure:  I, Edin Martin, am serving as a scribe at 10:11 PM on 9/14/2024 to document services personally performed by Behzad Abdi MD based on my observations and the provider's statements to me.        Behzad Abdi MD  09/15/24 0107       Behzad Abdi  MD Cash  09/15/24 0151

## 2024-09-15 NOTE — ED NOTES
Bed: ED35  Expected date: 9/14/24  Expected time: 9:44 PM  Means of arrival: Ambulance  Comments:  Sapna Alejandre6 Etoh withdrawal

## 2024-09-15 NOTE — DISCHARGE INSTRUCTIONS

## 2024-09-17 LAB
ATRIAL RATE - MUSE: 102 BPM
DIASTOLIC BLOOD PRESSURE - MUSE: NORMAL MMHG
INTERPRETATION ECG - MUSE: NORMAL
P AXIS - MUSE: NORMAL DEGREES
PR INTERVAL - MUSE: 208 MS
QRS DURATION - MUSE: 80 MS
QT - MUSE: 340 MS
QTC - MUSE: 443 MS
R AXIS - MUSE: 120 DEGREES
SYSTOLIC BLOOD PRESSURE - MUSE: NORMAL MMHG
T AXIS - MUSE: 131 DEGREES
VENTRICULAR RATE- MUSE: 102 BPM

## 2024-09-19 ENCOUNTER — HOSPITAL ENCOUNTER (INPATIENT)
Facility: CLINIC | Age: 44
LOS: 1 days | Discharge: HOME | DRG: 897 | End: 2024-09-21
Attending: EMERGENCY MEDICINE | Admitting: PSYCHIATRY & NEUROLOGY
Payer: COMMERCIAL

## 2024-09-19 DIAGNOSIS — K21.00 GASTROESOPHAGEAL REFLUX DISEASE WITH ESOPHAGITIS WITHOUT HEMORRHAGE: ICD-10-CM

## 2024-09-19 DIAGNOSIS — F10.90 ALCOHOL USE DISORDER: ICD-10-CM

## 2024-09-19 DIAGNOSIS — I10 HTN, GOAL BELOW 140/90: ICD-10-CM

## 2024-09-19 LAB
ALBUMIN SERPL BCG-MCNC: 4.3 G/DL (ref 3.5–5.2)
ALCOHOL BREATH TEST: 0.23 (ref 0–0.01)
ALP SERPL-CCNC: 147 U/L (ref 40–150)
ALT SERPL W P-5'-P-CCNC: 39 U/L (ref 0–50)
ANION GAP SERPL CALCULATED.3IONS-SCNC: 14 MMOL/L (ref 7–15)
AST SERPL W P-5'-P-CCNC: 46 U/L (ref 0–45)
BASOPHILS # BLD AUTO: 0 10E3/UL (ref 0–0.2)
BASOPHILS NFR BLD AUTO: 0 %
BILIRUB SERPL-MCNC: 0.5 MG/DL
BUN SERPL-MCNC: 6.2 MG/DL (ref 6–20)
CALCIUM SERPL-MCNC: 9.3 MG/DL (ref 8.8–10.4)
CHLORIDE SERPL-SCNC: 102 MMOL/L (ref 98–107)
CREAT SERPL-MCNC: 0.75 MG/DL (ref 0.51–0.95)
EGFRCR SERPLBLD CKD-EPI 2021: >90 ML/MIN/1.73M2
EOSINOPHIL # BLD AUTO: 0.1 10E3/UL (ref 0–0.7)
EOSINOPHIL NFR BLD AUTO: 1 %
ERYTHROCYTE [DISTWIDTH] IN BLOOD BY AUTOMATED COUNT: 14.5 % (ref 10–15)
GLUCOSE SERPL-MCNC: 109 MG/DL (ref 70–99)
HCO3 SERPL-SCNC: 27 MMOL/L (ref 22–29)
HCT VFR BLD AUTO: 37.3 % (ref 35–47)
HGB BLD-MCNC: 13.3 G/DL (ref 11.7–15.7)
IMM GRANULOCYTES # BLD: 0 10E3/UL
IMM GRANULOCYTES NFR BLD: 0 %
LYMPHOCYTES # BLD AUTO: 3.1 10E3/UL (ref 0.8–5.3)
LYMPHOCYTES NFR BLD AUTO: 39 %
MAGNESIUM SERPL-MCNC: 1.9 MG/DL (ref 1.7–2.3)
MCH RBC QN AUTO: 33.2 PG (ref 26.5–33)
MCHC RBC AUTO-ENTMCNC: 35.7 G/DL (ref 31.5–36.5)
MCV RBC AUTO: 93 FL (ref 78–100)
MONOCYTES # BLD AUTO: 0.5 10E3/UL (ref 0–1.3)
MONOCYTES NFR BLD AUTO: 6 %
NEUTROPHILS # BLD AUTO: 4.2 10E3/UL (ref 1.6–8.3)
NEUTROPHILS NFR BLD AUTO: 53 %
NRBC # BLD AUTO: 0 10E3/UL
NRBC BLD AUTO-RTO: 0 /100
PLATELET # BLD AUTO: 199 10E3/UL (ref 150–450)
POTASSIUM SERPL-SCNC: 3.2 MMOL/L (ref 3.4–5.3)
PROT SERPL-MCNC: 7.5 G/DL (ref 6.4–8.3)
RBC # BLD AUTO: 4.01 10E6/UL (ref 3.8–5.2)
SODIUM SERPL-SCNC: 143 MMOL/L (ref 135–145)
WBC # BLD AUTO: 7.9 10E3/UL (ref 4–11)

## 2024-09-19 PROCEDURE — 99284 EMERGENCY DEPT VISIT MOD MDM: CPT | Performed by: EMERGENCY MEDICINE

## 2024-09-19 PROCEDURE — 85025 COMPLETE CBC W/AUTO DIFF WBC: CPT | Performed by: EMERGENCY MEDICINE

## 2024-09-19 PROCEDURE — 36415 COLL VENOUS BLD VENIPUNCTURE: CPT | Performed by: EMERGENCY MEDICINE

## 2024-09-19 PROCEDURE — 82040 ASSAY OF SERUM ALBUMIN: CPT | Performed by: EMERGENCY MEDICINE

## 2024-09-19 PROCEDURE — 83036 HEMOGLOBIN GLYCOSYLATED A1C: CPT | Performed by: STUDENT IN AN ORGANIZED HEALTH CARE EDUCATION/TRAINING PROGRAM

## 2024-09-19 PROCEDURE — 99285 EMERGENCY DEPT VISIT HI MDM: CPT | Performed by: EMERGENCY MEDICINE

## 2024-09-19 PROCEDURE — 83735 ASSAY OF MAGNESIUM: CPT | Performed by: EMERGENCY MEDICINE

## 2024-09-19 PROCEDURE — 250N000013 HC RX MED GY IP 250 OP 250 PS 637: Performed by: EMERGENCY MEDICINE

## 2024-09-19 PROCEDURE — 82075 ASSAY OF BREATH ETHANOL: CPT | Performed by: EMERGENCY MEDICINE

## 2024-09-19 RX ORDER — DIAZEPAM 5 MG
5 TABLET ORAL ONCE
Status: COMPLETED | OUTPATIENT
Start: 2024-09-19 | End: 2024-09-19

## 2024-09-19 RX ORDER — DIAZEPAM 5 MG
5-20 TABLET ORAL EVERY 30 MIN PRN
Status: DISCONTINUED | OUTPATIENT
Start: 2024-09-19 | End: 2024-09-20

## 2024-09-19 RX ADMIN — DIAZEPAM 5 MG: 5 TABLET ORAL at 20:04

## 2024-09-19 RX ADMIN — DIAZEPAM 10 MG: 5 TABLET ORAL at 22:24

## 2024-09-19 ASSESSMENT — ACTIVITIES OF DAILY LIVING (ADL)
ADLS_ACUITY_SCORE: 35
ADLS_ACUITY_SCORE: 35
ADLS_ACUITY_SCORE: 33
ADLS_ACUITY_SCORE: 35
ADLS_ACUITY_SCORE: 35

## 2024-09-19 ASSESSMENT — COLUMBIA-SUICIDE SEVERITY RATING SCALE - C-SSRS
3. HAVE YOU BEEN THINKING ABOUT HOW YOU MIGHT KILL YOURSELF?: NO
5. HAVE YOU STARTED TO WORK OUT OR WORKED OUT THE DETAILS OF HOW TO KILL YOURSELF? DO YOU INTEND TO CARRY OUT THIS PLAN?: NO
6. HAVE YOU EVER DONE ANYTHING, STARTED TO DO ANYTHING, OR PREPARED TO DO ANYTHING TO END YOUR LIFE?: NO
1. IN THE PAST MONTH, HAVE YOU WISHED YOU WERE DEAD OR WISHED YOU COULD GO TO SLEEP AND NOT WAKE UP?: YES
2. HAVE YOU ACTUALLY HAD ANY THOUGHTS OF KILLING YOURSELF IN THE PAST MONTH?: YES
4. HAVE YOU HAD THESE THOUGHTS AND HAD SOME INTENTION OF ACTING ON THEM?: NO

## 2024-09-19 NOTE — ED TRIAGE NOTES
Patient has been drinking 10-15 shooters of vodka daily for the last week. Last drink around 1400. Denies seizure history.    Reporting SI, denies plan

## 2024-09-20 ENCOUNTER — TELEPHONE (OUTPATIENT)
Dept: BEHAVIORAL HEALTH | Facility: CLINIC | Age: 44
End: 2024-09-20
Payer: COMMERCIAL

## 2024-09-20 PROBLEM — F32.A DEPRESSION: Status: ACTIVE | Noted: 2024-09-20

## 2024-09-20 PROBLEM — F10.99 ALCOHOL-RELATED DISORDER (H): Status: ACTIVE | Noted: 2024-09-20

## 2024-09-20 PROBLEM — F10.90 ALCOHOL USE DISORDER: Status: ACTIVE | Noted: 2024-09-20

## 2024-09-20 LAB
AMPHETAMINES UR QL SCN: ABNORMAL
BARBITURATES UR QL SCN: ABNORMAL
BENZODIAZ UR QL SCN: ABNORMAL
BZE UR QL SCN: ABNORMAL
CANNABINOIDS UR QL SCN: ABNORMAL
EST. AVERAGE GLUCOSE BLD GHB EST-MCNC: 97 MG/DL
FENTANYL UR QL: ABNORMAL
GGT SERPL-CCNC: 53 U/L (ref 5–36)
HBA1C MFR BLD: 5 %
HCG UR QL: NEGATIVE
INTERNAL QC OK POCT: NORMAL
OPIATES UR QL SCN: ABNORMAL
PCP QUAL URINE (ROCHE): ABNORMAL
POCT KIT EXPIRATION DATE: NORMAL
POCT KIT LOT NUMBER: NORMAL
POTASSIUM SERPL-SCNC: 2.5 MMOL/L (ref 3.4–5.3)
POTASSIUM SERPL-SCNC: 3.4 MMOL/L (ref 3.4–5.3)
TSH SERPL DL<=0.005 MIU/L-ACNC: 1.97 UIU/ML (ref 0.3–4.2)

## 2024-09-20 PROCEDURE — 250N000011 HC RX IP 250 OP 636: Performed by: EMERGENCY MEDICINE

## 2024-09-20 PROCEDURE — 258N000003 HC RX IP 258 OP 636: Performed by: EMERGENCY MEDICINE

## 2024-09-20 PROCEDURE — 36415 COLL VENOUS BLD VENIPUNCTURE: CPT | Performed by: EMERGENCY MEDICINE

## 2024-09-20 PROCEDURE — 84443 ASSAY THYROID STIM HORMONE: CPT | Performed by: STUDENT IN AN ORGANIZED HEALTH CARE EDUCATION/TRAINING PROGRAM

## 2024-09-20 PROCEDURE — 80307 DRUG TEST PRSMV CHEM ANLYZR: CPT | Performed by: EMERGENCY MEDICINE

## 2024-09-20 PROCEDURE — HZ2ZZZZ DETOXIFICATION SERVICES FOR SUBSTANCE ABUSE TREATMENT: ICD-10-PCS | Performed by: PSYCHIATRY & NEUROLOGY

## 2024-09-20 PROCEDURE — 84132 ASSAY OF SERUM POTASSIUM: CPT | Performed by: STUDENT IN AN ORGANIZED HEALTH CARE EDUCATION/TRAINING PROGRAM

## 2024-09-20 PROCEDURE — 36415 COLL VENOUS BLD VENIPUNCTURE: CPT | Performed by: STUDENT IN AN ORGANIZED HEALTH CARE EDUCATION/TRAINING PROGRAM

## 2024-09-20 PROCEDURE — 250N000013 HC RX MED GY IP 250 OP 250 PS 637: Performed by: EMERGENCY MEDICINE

## 2024-09-20 PROCEDURE — 82977 ASSAY OF GGT: CPT | Performed by: STUDENT IN AN ORGANIZED HEALTH CARE EDUCATION/TRAINING PROGRAM

## 2024-09-20 PROCEDURE — 99223 1ST HOSP IP/OBS HIGH 75: CPT | Performed by: PSYCHIATRY & NEUROLOGY

## 2024-09-20 PROCEDURE — 128N000004 HC R&B CD ADULT

## 2024-09-20 PROCEDURE — 84132 ASSAY OF SERUM POTASSIUM: CPT | Performed by: EMERGENCY MEDICINE

## 2024-09-20 PROCEDURE — 250N000013 HC RX MED GY IP 250 OP 250 PS 637: Performed by: PSYCHIATRY & NEUROLOGY

## 2024-09-20 PROCEDURE — 81025 URINE PREGNANCY TEST: CPT | Performed by: EMERGENCY MEDICINE

## 2024-09-20 PROCEDURE — 250N000013 HC RX MED GY IP 250 OP 250 PS 637: Performed by: STUDENT IN AN ORGANIZED HEALTH CARE EDUCATION/TRAINING PROGRAM

## 2024-09-20 RX ORDER — TRAZODONE HYDROCHLORIDE 50 MG/1
50 TABLET, FILM COATED ORAL
Status: DISCONTINUED | OUTPATIENT
Start: 2024-09-20 | End: 2024-09-21 | Stop reason: HOSPADM

## 2024-09-20 RX ORDER — HYDROXYZINE HYDROCHLORIDE 25 MG/1
25-50 TABLET, FILM COATED ORAL
Status: DISCONTINUED | OUTPATIENT
Start: 2024-09-20 | End: 2024-09-20

## 2024-09-20 RX ORDER — FOLIC ACID 1 MG/1
1 TABLET ORAL DAILY
Status: DISCONTINUED | OUTPATIENT
Start: 2024-09-20 | End: 2024-09-21 | Stop reason: HOSPADM

## 2024-09-20 RX ORDER — MAGNESIUM HYDROXIDE/ALUMINUM HYDROXICE/SIMETHICONE 120; 1200; 1200 MG/30ML; MG/30ML; MG/30ML
30 SUSPENSION ORAL EVERY 4 HOURS PRN
Status: DISCONTINUED | OUTPATIENT
Start: 2024-09-20 | End: 2024-09-21 | Stop reason: HOSPADM

## 2024-09-20 RX ORDER — BUSPIRONE HYDROCHLORIDE 5 MG/1
5-10 TABLET ORAL 3 TIMES DAILY
Status: DISCONTINUED | OUTPATIENT
Start: 2024-09-20 | End: 2024-09-21 | Stop reason: HOSPADM

## 2024-09-20 RX ORDER — POTASSIUM CHLORIDE 1.5 G/1.58G
20 POWDER, FOR SOLUTION ORAL ONCE
Status: COMPLETED | OUTPATIENT
Start: 2024-09-20 | End: 2024-09-20

## 2024-09-20 RX ORDER — OLANZAPINE 10 MG/2ML
10 INJECTION, POWDER, FOR SOLUTION INTRAMUSCULAR 3 TIMES DAILY PRN
Status: DISCONTINUED | OUTPATIENT
Start: 2024-09-20 | End: 2024-09-21 | Stop reason: HOSPADM

## 2024-09-20 RX ORDER — ONDANSETRON 4 MG/1
4 TABLET, ORALLY DISINTEGRATING ORAL ONCE
Status: COMPLETED | OUTPATIENT
Start: 2024-09-20 | End: 2024-09-20

## 2024-09-20 RX ORDER — CHOLECALCIFEROL (VITAMIN D3) 50 MCG
1 TABLET ORAL DAILY
COMMUNITY

## 2024-09-20 RX ORDER — OLANZAPINE 10 MG/1
10 TABLET ORAL 3 TIMES DAILY PRN
Status: DISCONTINUED | OUTPATIENT
Start: 2024-09-20 | End: 2024-09-21 | Stop reason: HOSPADM

## 2024-09-20 RX ORDER — ACETAMINOPHEN 325 MG/1
650 TABLET ORAL EVERY 4 HOURS PRN
Status: DISCONTINUED | OUTPATIENT
Start: 2024-09-20 | End: 2024-09-21 | Stop reason: HOSPADM

## 2024-09-20 RX ORDER — MULTIPLE VITAMINS W/ MINERALS TAB 9MG-400MCG
1 TAB ORAL DAILY
Status: DISCONTINUED | OUTPATIENT
Start: 2024-09-20 | End: 2024-09-21 | Stop reason: HOSPADM

## 2024-09-20 RX ORDER — METOPROLOL SUCCINATE 25 MG/1
25 TABLET, EXTENDED RELEASE ORAL DAILY
Status: DISCONTINUED | OUTPATIENT
Start: 2024-09-20 | End: 2024-09-21 | Stop reason: HOSPADM

## 2024-09-20 RX ORDER — FAMOTIDINE 20 MG/1
20 TABLET, FILM COATED ORAL 2 TIMES DAILY PRN
Status: DISCONTINUED | OUTPATIENT
Start: 2024-09-20 | End: 2024-09-21 | Stop reason: HOSPADM

## 2024-09-20 RX ORDER — VITAMIN B COMPLEX
50 TABLET ORAL DAILY
Status: DISCONTINUED | OUTPATIENT
Start: 2024-09-20 | End: 2024-09-21 | Stop reason: HOSPADM

## 2024-09-20 RX ORDER — HYDROXYZINE HYDROCHLORIDE 25 MG/1
25-50 TABLET, FILM COATED ORAL 3 TIMES DAILY PRN
Status: DISCONTINUED | OUTPATIENT
Start: 2024-09-20 | End: 2024-09-21 | Stop reason: HOSPADM

## 2024-09-20 RX ORDER — AMOXICILLIN 250 MG
1 CAPSULE ORAL 2 TIMES DAILY PRN
Status: DISCONTINUED | OUTPATIENT
Start: 2024-09-20 | End: 2024-09-21 | Stop reason: HOSPADM

## 2024-09-20 RX ORDER — HYDROXYZINE HYDROCHLORIDE 25 MG/1
25 TABLET, FILM COATED ORAL ONCE
Status: COMPLETED | OUTPATIENT
Start: 2024-09-20 | End: 2024-09-20

## 2024-09-20 RX ORDER — ONDANSETRON 4 MG/1
4 TABLET, ORALLY DISINTEGRATING ORAL EVERY 6 HOURS PRN
COMMUNITY

## 2024-09-20 RX ORDER — ATENOLOL 50 MG/1
50 TABLET ORAL DAILY PRN
Status: DISCONTINUED | OUTPATIENT
Start: 2024-09-20 | End: 2024-09-20

## 2024-09-20 RX ORDER — HYDROXYZINE HYDROCHLORIDE 25 MG/1
25 TABLET, FILM COATED ORAL EVERY 4 HOURS PRN
Status: DISCONTINUED | OUTPATIENT
Start: 2024-09-20 | End: 2024-09-20

## 2024-09-20 RX ORDER — LISINOPRIL 20 MG/1
20 TABLET ORAL DAILY
Status: DISCONTINUED | OUTPATIENT
Start: 2024-09-20 | End: 2024-09-21 | Stop reason: HOSPADM

## 2024-09-20 RX ORDER — DIAZEPAM 5 MG
5-20 TABLET ORAL EVERY 30 MIN PRN
Status: DISCONTINUED | OUTPATIENT
Start: 2024-09-20 | End: 2024-09-21 | Stop reason: HOSPADM

## 2024-09-20 RX ORDER — NICOTINE 21 MG/24HR
1 PATCH, TRANSDERMAL 24 HOURS TRANSDERMAL DAILY
Status: DISCONTINUED | OUTPATIENT
Start: 2024-09-20 | End: 2024-09-21 | Stop reason: HOSPADM

## 2024-09-20 RX ORDER — METOPROLOL SUCCINATE 25 MG/1
25 TABLET, EXTENDED RELEASE ORAL DAILY
COMMUNITY
Start: 2024-09-06

## 2024-09-20 RX ORDER — ONDANSETRON 4 MG/1
4 TABLET, ORALLY DISINTEGRATING ORAL EVERY 6 HOURS PRN
Status: DISCONTINUED | OUTPATIENT
Start: 2024-09-20 | End: 2024-09-21 | Stop reason: HOSPADM

## 2024-09-20 RX ADMIN — Medication 1 TABLET: at 16:27

## 2024-09-20 RX ADMIN — DIAZEPAM 5 MG: 5 TABLET ORAL at 08:47

## 2024-09-20 RX ADMIN — HYDROXYZINE HYDROCHLORIDE 25 MG: 25 TABLET, FILM COATED ORAL at 11:42

## 2024-09-20 RX ADMIN — Medication 50 MCG: at 17:47

## 2024-09-20 RX ADMIN — SODIUM CHLORIDE 1000 ML: 9 INJECTION, SOLUTION INTRAVENOUS at 00:14

## 2024-09-20 RX ADMIN — METOPROLOL SUCCINATE 25 MG: 25 TABLET, FILM COATED, EXTENDED RELEASE ORAL at 17:46

## 2024-09-20 RX ADMIN — DIAZEPAM 5 MG: 5 TABLET ORAL at 03:44

## 2024-09-20 RX ADMIN — DIAZEPAM 10 MG: 5 TABLET ORAL at 06:12

## 2024-09-20 RX ADMIN — LISINOPRIL 20 MG: 20 TABLET ORAL at 17:47

## 2024-09-20 RX ADMIN — ONDANSETRON 4 MG: 4 TABLET, ORALLY DISINTEGRATING ORAL at 11:42

## 2024-09-20 RX ADMIN — POTASSIUM CHLORIDE 20 MEQ: 1.5 POWDER, FOR SOLUTION ORAL at 00:24

## 2024-09-20 RX ADMIN — DIAZEPAM 10 MG: 5 TABLET ORAL at 01:15

## 2024-09-20 RX ADMIN — THIAMINE HCL TAB 100 MG 100 MG: 100 TAB at 16:27

## 2024-09-20 RX ADMIN — HYDROXYZINE HYDROCHLORIDE 25 MG: 25 TABLET, FILM COATED ORAL at 16:27

## 2024-09-20 RX ADMIN — TRAZODONE HYDROCHLORIDE 50 MG: 50 TABLET ORAL at 20:12

## 2024-09-20 RX ADMIN — FOLIC ACID 1 MG: 1 TABLET ORAL at 16:27

## 2024-09-20 ASSESSMENT — ACTIVITIES OF DAILY LIVING (ADL)
ADLS_ACUITY_SCORE: 28
ADLS_ACUITY_SCORE: 35
ADLS_ACUITY_SCORE: 28
DRESS: INDEPENDENT
ADLS_ACUITY_SCORE: 35
ADLS_ACUITY_SCORE: 28
ADLS_ACUITY_SCORE: 35
ADLS_ACUITY_SCORE: 28
ADLS_ACUITY_SCORE: 35
ADLS_ACUITY_SCORE: 35
ADLS_ACUITY_SCORE: 28
ADLS_ACUITY_SCORE: 35
ORAL_HYGIENE: INDEPENDENT
ADLS_ACUITY_SCORE: 28
HYGIENE/GROOMING: INDEPENDENT
ADLS_ACUITY_SCORE: 35

## 2024-09-20 ASSESSMENT — LIFESTYLE VARIABLES: SKIP TO QUESTIONS 9-10: 0

## 2024-09-20 NOTE — PLAN OF CARE
Goal Outcome Evaluation:    Plan of Care Reviewed With: patient      Problem: Alcohol Withdrawal  Goal: Alcohol Withdrawal Symptom Control  Outcome: Progressing     Pt was monitored for alcohol withdrawal symptoms using MSSA.   Pt denied any symptoms except anxiety. Requested and got prn hydroxyzine.  Spent most of her time in the lounge watching TV, interacting with select peers.   At some dinner. Took her BP meds.     1600  MSSA : 5  B/P: 138/93, T: 97.3, P: 86, R: 16     2000  MSSA: 3  B/P: 138/97, T: 98.8, P: 79, R: 16   Denied feeling anxious or depressed.

## 2024-09-20 NOTE — TELEPHONE ENCOUNTER
R: Patient cleared and ready for behavioral bed placement: Yes    12:36 AM Intake called [facility] and spoke with Jeffrey hinojosa.     12:46 AM Intake received a call from Dr. Ashwin Dominguez, accepting this pt to [facility].     1AM Ind[facility] complete.     1:02 AM Intake called [facility] and provided disposition to Sarah NICHOLE. Nurse report: Charge will call ED.     1:03 AM Intake called [facility] ed and provided placement information to SARANYA.
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
## 2024-09-20 NOTE — DISCHARGE INSTRUCTIONS
Behavioral Discharge Planning and Instructions  THANK YOU FOR CHOOSING [facility]  3A  [phone]    Summary: You were admitted to [facility] on 9/19/2024 for detoxification from alcohol.  A medical exam was performed that included lab work. You have met with a  and opted to discharge to home with plans to attend AA.  Please take care and make your recovery a daily priority, Tati!  It was a pleasure working with you and the entire treatment team here wishes you the very best in your recovery!     Recommendation:  Continue to attend 2-3 weekly 12 step meetings and meet with your therapist. If you feel you are in need of chemical health treatment please contact [facility] to complete and assessment and follow any and all recommendations.     [facility] - Assessments by appointment.  [address] [place] [address]  [phone]    To complete a Teacher[employer] application please follow the following website - [email or web address]  To apply for Snap benefit please follow the following website - [email or web address]    Main Diagnoses:  Per Tati Portillo, BLANCA CNP;  303.90 (F10.20) Alcohol Use Disorder Severe    Major Treatments, Procedures and Findings:  You were treated for alcohol detoxification using Valium. You have met with a  to develop a treatment plan for discharge. You had labs drawn and those results were reviewed with you. Please take a copy of your lab work with you to your next primary care provider appointment.    Symptoms to Report:  If you experience more anxiety, confusion, sleeplessness, deep sadness or thoughts of suicide, notify your treatment team or notify your primary care provider. IF ANY OF THE SYMPTOMS YOU ARE EXPERIENCING ARE A MEDICAL EMERGENCY CALL 911 IMMEDIATELY.     Lifestyle Adjustment: Adjust your lifestyle to get enough sleep, relaxation, exercise and good nutrition. Continue to develop healthy coping  skills to decrease stress and promote a sober living environment. Do not use mood altering substances including alcohol, illegal drugs or addictive medications other than what is currently prescribed.     Disposition: Home    Facts about COVID19 at www.cdc.gov/COVID19 and www.[place].gov/covid19    Keeping hands clean is one of the most important steps we can take to avoid getting sick and spreading germs to others.  Please wash your hands frequently and lather with soap for at least 20 seconds!    Follow-up Appointment:     Primary Care- You will schedule a follow up appointment with your primary care provider within 30 days of discharge.     [facility]  Located in: [facility]  Address: [address] [place] [address]  Phone: [phone]    Psychiatry:    Date: Tuesday, 9/24/2024  Time: 3:00 pm - 4:00 pm  Provider: Dionisio Andino  MSN  CNP,PMHNP,RN  [place] [address] [place] [address]  Phone: [phone]  Type: Telepsychiatry    Patient Instructions:    All Intake appointments will be conducted via telehealth and must have access to video through smart phone or laptop/pc/tablet. You will be contacted by our office to set up the virtual meeting. If you have questions, please contact our office at [phone].    It is your responsibility to contact your insurance company directly to verify coverage, eligibility, payment, and benefit information for any appointments or referrals listed above.    [facility] maintains an extensive network of licensed behavioral health providers to connect patients with the services they need. We do not charge providers a fee to participate in our referral network. We match patients with providers based on a patient's specific treatment needs, insurance coverage, and location. Our first effort will be to refer you to a provider within your care system and will utilize providers outside your care  system as needed.         Recovery apps for your phone for educational purposes and to locate in person and zoom recovery meetings  Everything AA -  kenisha is a great resource  12 Step Toolkit - educational purpose learning about the 12 steps to recovery  [place] - meeting kenisha  AA  - meeting kenisha  Meeting guide - meeting kenisha  Quick NA meeting - meeting kenisha  [place]- has various apps    Resources:  AA/NA meetings have returned to in-person or a hybrid combination of zoom/in-person therefore please check online to verify*  Need Support Now? If you or someone you know is struggling or in crisis, help is available. Call or text 069 or chat TEXbase  AA meetings search for them at: https://aa-intergroup.org (worldwide meeting listings)  AA meetings for [place] area can be found online at: https://[place].org (click local online meetings listings)  NA meetings for [place] area can be found online at: https://www.nam[place].org  (click find a meeting)  AA and NA Sponsors are excellent resources for support and you can find one at any support group meeting.   Alcoholics Anonymous (https://aa.org/): for information 24 hours/day  AA Intergroup service office in [place] (http://www.aastpaul.org/) 704.133.1989  AA Intergroup service office in [facility]: [phone]. (http://www.a[place].org/)  Narcotics Anonymous (www.nam[place].org) (861) 819-1856  https://aafairviewriverside.org/meetings  SMART Recovery - self management for addiction recovery:  www.smartrecovery.org  Pathways ~ A Health Crisis Resource & Support Center:  810.393.7342.  https://prescribetoprevent.org/patient-education/videos/  http://www.harmreduction.org  Crisis Text Line  Text 169597  You will be connected with a trained live crisis counselor to provide support. Por espanol, texto  ROSALINE a 466337 o texto a 442-AYUDAME en [facility] Hope Line  1.827.SUICIDE [2678477]  [facility] 097-931-5046  Support  "Group:  AA/NA and Sponsor/support.  Fast Tracker  Linking people to mental health and substance use disorder resources  [employer]   [place] Mental Health Warm Line  Peer to peer support  Monday thru Saturday, 12 pm to 10 pm  421.704.4044 or 7.704.589.7422  Text \"Support\" to 07778  National [place] on Mental Illness (SYLVIA)  504.043.0845 or 1.888.SYLVIA.HELPS  Alcoholics Anonymous (www.alcoholics-anonymous.org): Check your phone book for your local chapter.  Suicide Awareness Voices of Education (SAVE) (www.save.org): 424-370-TASD (7283)  National Suicide Prevention Line (www.mentalhealthmn.org): 960-249-UQLQ (5784)  Mental Health Consumer/Survivor Network of [place] (www.mhcsn.net): 557.637.9411 or 201-965-0209  Mental Health Association of [place] (www.mentalhealth.org): 116.921.5085 or 236-752-1032   Substance Abuse and Mental Health Services (www.samhsa.gov)  [place] Opioid Prevention Coalition: www.opioidcoalition.org      [place] Recovery Connection (MRC)  [facility] connects people seeking recovery to resources that help foster and sustain long-term recovery.  Whether you are seeking resources for treatment, transportation, housing, job training, education, health care or other pathways to recovery, [facility] is a great place to start.  109.777.5633.  www.[place].org    Great Pod casts for nutrition and wellness  Listen on Apple Podcasts  Dishing Up Nutrition   [employer]   Nutrition       Understand the connection between what you eat and how you feel. Hosted by licensed nutritionists and dietitians from [employer] we share practical, real-life solutions for healthier living through nutrition.     General Medication Instructions:   See your medication sheet(s) for instructions.   Take all medications as prescribed.  Make no changes unless your primary care provider suggests them.   Go to all your primary care provider visits.  Be sure to have all your " required lab tests. This way, your medicines can be refilled on time.  Do not use any forms of alcohol.    Please Note: If you have any questions at anytime after you discharged please call [facility] detox unit 3A at [phone].    Here are a list of additional numbers you can call if you are wanting to resume services through [facility]:  [facility] Assessment Intake: [phone]  [facility] Adult SRINI Intensive Outpatient  call: [phone]  [facility] Admissions [phone]    Recovery Clinic call: [phone]  [facility]: [phone]  Medical Records call: [phone]  Billing Department call: [phone]    Please remember to take all of your behavioral discharge planning and lab paperwork to any follow up appointments, it contains your lab results, diagnosis, medication list and discharge recommendations.      THANK YOU FOR CHOOSING [facility]

## 2024-09-20 NOTE — H&P
Psychiatry History and Physical    Tati Hamilton MRN# [id number]   Age: 43 year old YOB: 1980     Date of Admission:  9/19/2024          Assessment:   This patient is a 43 year old female with history of alcohol use and anxiety who presented to ED seeking detox from alcohol. Medically cleared in ED, admitted to 3A as voluntary patient. Drinking at least 10 shots alcohol daily, EtOH breath test 0.23(H), UDS positive for benzodiazepines and barbiturates (received in other EDs). MSSA with diazepam started for alcohol withdrawal. Withdrawal precautions in place, denies history of seizures. Admission labs ordered and reviewed those resulted. IM consult placed. Had reported SI when presenting to ED, now denying safety concerns including SI and HI. PTA medications continued as appropriate. Pt reports goals for hospitalization are to complete detox and get outpatient supports including MAT for AUD prior to discharge, unsure if interested in CD treatment. Of note, expressed desire to discharge from ED and not pursue admission, then later changed mind and again agreeable to admit to 3A.      Inpatient psychiatric hospitalization is warranted at this time for safety, stabilization, and possible adjustment in medications.         Diagnoses:   Alcohol use disorder, severe, dependence with withdrawal with complication   Nicotine dependence with withdrawal   ESTEPHANIA  Unspecified depression   Suicidal ideation, improved  HTN  Hypokalemia, improved  Elevated LFTs    Clinically Significant Risk Factors Present on Admission        # Hypokalemia: Lowest K = 2.5 mmol/L in last 2 days, will replace as needed           # Hypertension: Noted on problem list             # Financial/Environmental Concerns: other (see comments) (At risk of losing insurance/housing if unable to obtain new work.)                   Plan:   Psychiatric treatment/inteventions:  Medications:   -MSSA with diazepam, thiamine, folic acid, multivitamin  for alcohol withdrawal   -did not order PTA lorazepam PRN, pt denies taking daily   -continue PTA buspirone 5-10mg TID for anxiety   -continue PTA PRN hydroxyzine at 25-50mg TID anxiety   -PRN trazodone 50mg at bedtime for sleep   -consider MAT for AUD prior to discharge, order placed for pt to receive information on medication   -patient interested in likely detox only, will discuss further with CTC       Laboratory/Imaging: EtOH breath test 0.23(H), UDS positive for benzodiazepines and barbiturates; initial CMP with K 3.2(L), AST 46(H), glucose 109(H) otherwise WNL; Hgba1c WNL; CBC with MCH 33.2(H) otherwise WNL; repeat K+ 2.5(LL), repeat again 3.4WNL; Urine HCG negative, ordered repeat K+ for AM given pts repeat was on low end of normal, TSH and GGT also ordered to complete admission labs     Patient will be treated in therapeutic milieu with appropriate individual and group therapies as described.    Medical treatment/interventions:  Medical concerns:   1) Alcohol withdrawal  -MSSA as above  -PRN zofran and imodium for GI distress related to withdrawal   -withdrawal precautions    2) IM consult placed for management of other medical concerns, including hypokalemia and HTN, appreciate assistance, consult pending at this time, see note when completed for full details.       Legal Status: Voluntary    Safety Assessment:   Behavioral Orders   Procedures    Code 1 - Restrict to Unit    Routine Programming     As clinically indicated    Status 15     Every 15 minutes.    Suicide precautions: Suicide Risk: LOW     Patients on Suicide Precautions should have a Combination Diet ordered that includes a Diet selection(s) AND a Behavioral Tray selection for Safe Tray - with utensils, or Safe Tray - NO utensils       Order Specific Question:   Suicide Risk     Answer:   LOW    Withdrawal precautions      Pt has not required locked seclusion or restraints in the past 24 hours to maintain safety, please refer to RN  documentation for further details.    The risks, benefits, alternatives and side effects have been discussed and are understood by the patient.    Disposition: Pending clinical stabilization. Will likely discharge to home vs CD tx when stable.    This note was created by undersigned using a Dragon dictation system. All typing errors or contextual distortion are unintentional and software inherent.     Thalia Syed DO  [facility]iat[facility]         Chief Complaint:     Alcohol withdrawal          History of Present Illness:     Sharee is a 43 year old female with history of alcohol use and anxiety who presented to ED seeking detox from alcohol.     Chart review completed including ED notes from this encounter, recent PCP notes, recent ED visit from 9/14 for similar presentation, no previous admissions to 3[facility].    Per ED physician note: Tati Hamilton is a 43 year old female who presents for alcohol detox.  Drinks approximately 10 shots per day.  They have a history of withdrawal symptoms and have not had a seizure from alcohol in the past.  Last drink was this afternoon.  No other drug use.  She also notes mental health concern, suicidal ideation with no plan.  No other symptoms noted.        Per DEC assessment: Tati Hamilton presents to the ED with family/friends. Patient is presenting to the ED for the following concerns: Suicidal ideation, Other (see comment) (Alcohol intoxication).   Factors that make the mental health crisis life threatening or complex are:  Pt was brought the ER by her ex-partner, presenting with alcohol intoxication and suicidal ideation. During the interview, pt reported she was no longer suicidal but at risk of resuming alcohol use if discharged while still experiencing symptoms of withdrawal. Pt requested to detox before returning home, which attending physician supported and arranged. Pt denied HI/SIB and appears cleared for detox.     History of the Crisis   Pt  reported stress at work contributed to a relapse after 8-9 months of sobriety, and that alcohol use led her to quitting her job, which she now regrets, and that her depressive symptoms led to her binge drinking for the last week. Pt reported that Rx for anxiety (e.g., Hydroxyzine, Buspar, Lorazepam PRN) were not effective/sufficient for work stress which led to her feeling overwhelmed.     Brief Psychosocial History  Family:  , Children    Support System:  Parent(s), Other (specify) (Ex)  Employment Status:  employment seeking  Source of Income:  other (see comments) (Savings)  Financial Environmental Concerns:  other (see comments) (At risk of losing insurance/housing if unable to obtain new work.)  Current Hobbies:  group/social activities  Barriers in Personal Life:  mental health concerns     Significant Clinical History  Current Anxiety Symptoms:  anxious  Current Depression/Trauma:  thoughts of death/suicide, sadness, impaired decision making  Current Somatic Symptoms:  anxious  Current Psychosis/Thought Disturbance:  impulsive  Current Eating Symptoms:     Chemical Use History:  Alcohol: Binge  Last Use:: 09/19/24  Benzodiazepines: None  Opiates: None  Cocaine: None  Marijuana: None  Other Use: None   Past diagnosis:  Anxiety Disorder, Depression  Family history:     Past treatment:  Individual therapy, AA/NA, Psychiatric Medication Management, Primary Care  Details of most recent treatment:  Met with PCP in August re: Rx.  Other relevant history:        Upon interview: Pt confirms information above, she was seen in the ED, reported that she started drinking alcohol in her 20's and became problematic by mid 20's, had several months of sobriety and then relapsed due to stressors. Drinking at least 10 shots of alcohol per day, denies other substance use. Has tolerance to alcohol, will drink more than intended, binge, and withdrawal symptoms when she stops drinking. Was having some tremors, nausea,  sweating but states most of this has improved since being in the ED outside of her nausea. She also states her mood has been down, impacted by her alcohol use and intoxication and had been having some suicidal thoughts but has not had any this morning. She does experience anxiety and anxiety tends to be a trigger for her alcohol use. Denies any HI or AVH. Does take medication for her mood but not sure how effective they are. During interview she started to state that she no longer wanted to pursue admission to detox unit, she was requesting to discharge stating she had outpatient follow up in place from ED and not interested in CD treatment. Writer informed ED team of pts request to discharge. Writer later contacted by intake team that patient now changed her mind again and wanting to pursue admission to detox unit and she was placed back in queue.             Psychiatric Review of Systems:   Depression:   Reports: low mood, guilt, poor appetite   Denies: suicidal ideation, decreased interest, changes in sleep, hopelessness, helplessness, impaired concentration, decreased energy, irritability.  Lorie:   Reports: none  Denies: sleeplessness, impulsiveness, racing thoughts, increased goal-directed activities, pressured speech, increase in energy  Psychosis:   Reports: none  Denies: visual hallucinations, auditory hallucinations, paranoia, grandiosity, ideas of reference, thought insertions, thought broadcasting.  Anxiety:   Reports: excessive worries that are difficult to control  Denies: panic attacks  PTSD:   Reports: none  OCD:   Reports: none  ED:   Reports: none         Medical Review of Systems:     10 point review of systems is otherwise negative unless noted above.            Psychiatric History:   Psychiatric Hospitalizations: denies  History of Psychosis: denies  Prior ECT: denies  Court Commitment: denies  Suicide Attempts: denies  Self-injurious Behavior: denies  Violence toward others: denies  Use of  Psychotropics: recently prescribed lorazepam (does not take more than 2x per week), hydroxyzine, buspirone          Substance Use History:   Alcohol: See HPI   Cannabis: none  Nicotine: cigarettes   Cocaine: none  Methamphetamine: none  Opiates/Heroin: none  Benzodiazepines: none  Hallucinogens: none  Inhalants: none      Prior Chemical Dependency treatment: none         Social History:   Single, , lives alone, has son 15yo, lives with ex , recently unemployed from quitting job.          Family History:     Family History   Problem Relation Age of Onset    Gallbladder Disease Mother     Other - See Comments Mother         varicose veins     Hypertension Father     Rectal Cancer Maternal Grandfather     Cerebrovascular Disease Maternal Grandmother     Heart Disease Paternal Grandfather              Past Medical History:     Past Medical History:   Diagnosis Date    Anxiety     Calculus of kidney     Depressive disorder     Depressive disorder, not elsewhere classified     ESTEPHANIA (generalised anxiety disorder)     High risk HPV infection 3/20/15,10/17/16    Not HPV 16 or 18, HR, Not 16 or 18    History of colposcopy 08/05/2020    HTN, goal below 140/90     Lung nodules 05/2010    Needs f/u CT Nov 2010    Multiple sclerosis (H) Dx in 2001    Multiple sclerosis (H)     PONV (postoperative nausea and vomiting)     Vitamin D deficiencies        History of seizures: denies         Past Surgical History:     Past Surgical History:   Procedure Laterality Date    HC COLP CERVIX/UPPER VAGINA W LOOP ELEC BX CERVIX      HC REMOVAL OF TONSILS,<11 Y/O      Tonsils <12y.o.    LAPAROSCOPIC CHOLECYSTECTOMY  5/8/2014    Procedure: LAPAROSCOPIC CHOLECYSTECTOMY;  Surgeon: Sona Giraldo MD;  Location:  OR    [facility] NONSPECIFIC PROCEDURE      2 procedures for kidney stones              Allergies:      Allergies   Allergen Reactions    No Known Drug Allergy               Medications:   I have reviewed this patient's  current medications  Medications Prior to Admission   Medication Sig Dispense Refill Last Dose    busPIRone (BUSPAR) 5 MG tablet Take 1-2 tablets (5-10 mg) by mouth 3 times daily. 90 tablet 2 Past Week at unknown    famotidine (PEPCID) 20 MG tablet Take 1 tablet (20 mg) by mouth 2 times daily (Patient taking differently: Take 20 mg by mouth 2 times daily as needed (heartburn).) 180 tablet 3 Past Week at unknown    hydrOXYzine luis enrique (VISTARIL) 25 MG capsule Take 1-2 capsules (25-50 mg) by mouth nightly as needed for anxiety 180 capsule 1 Past Week at unknown    lisinopril (ZESTRIL) 20 MG tablet Take 1 tablet (20 mg) by mouth 2 times daily (Patient taking differently: Take 20 mg by mouth daily.) 180 tablet 1 Past Week at unknown    LORazepam (ATIVAN) 0.5 MG tablet TAKE ONE TABLET BY MOUTH ONCE DAILY AS NEEDED FOR ANXIETY 30 tablet 0 Past Week at unknown    metoprolol succinate ER (TOPROL XL) 25 MG 24 hr tablet Take 25 mg by mouth daily.   Past Week at unknown    ondansetron (ZOFRAN ODT) 4 MG ODT tab Take 4 mg by mouth every 6 hours as needed for nausea.   Past Week at unknown    vitamin B complex with vitamin C (VITAMIN  B COMPLEX) tablet Take 1 tablet by mouth daily.   Past Week at unknown    vitamin D3 (CHOLECALCIFEROL) 50 mcg (2000 units) tablet Take 1 tablet by mouth daily.   Past Week at unknown             Labs:     Recent Results (from the past 24 hour(s))   Comprehensive metabolic panel    Collection Time: 09/19/24  7:19 PM   Result Value Ref Range    Sodium 143 135 - 145 mmol/L    Potassium 3.2 (L) 3.4 - 5.3 mmol/L    Carbon Dioxide (CO2) 27 22 - 29 mmol/L    Anion Gap 14 7 - 15 mmol/L    Urea Nitrogen 6.2 6.0 - 20.0 mg/dL    Creatinine 0.75 0.51 - 0.95 mg/dL    GFR Estimate >90 >60 mL/min/1.73m2    Calcium 9.3 8.8 - 10.4 mg/dL    Chloride 102 98 - 107 mmol/L    Glucose 109 (H) 70 - 99 mg/dL    Alkaline Phosphatase 147 40 - 150 U/L    AST 46 (H) 0 - 45 U/L    ALT 39 0 - 50 U/L    Protein Total 7.5 6.4 - 8.3  g/dL    Albumin 4.3 3.5 - 5.2 g/dL    Bilirubin Total 0.5 <=1.2 mg/dL   Magnesium    Collection Time: 09/19/24  7:19 PM   Result Value Ref Range    Magnesium 1.9 1.7 - 2.3 mg/dL   CBC with platelets and differential    Collection Time: 09/19/24  7:19 PM   Result Value Ref Range    WBC Count 7.9 4.0 - 11.0 10e3/uL    RBC Count 4.01 3.80 - 5.20 10e6/uL    Hemoglobin 13.3 11.7 - 15.7 g/dL    Hematocrit 37.3 35.0 - 47.0 %    MCV 93 78 - 100 fL    MCH 33.2 (H) 26.5 - 33.0 pg    MCHC 35.7 31.5 - 36.5 g/dL    RDW 14.5 10.0 - 15.0 %    Platelet Count 199 150 - 450 10e3/uL    % Neutrophils 53 %    % Lymphocytes 39 %    % Monocytes 6 %    % Eosinophils 1 %    % Basophils 0 %    % Immature Granulocytes 0 %    NRBCs per 100 WBC 0 <1 /100    Absolute Neutrophils 4.2 1.6 - 8.3 10e3/uL    Absolute Lymphocytes 3.1 0.8 - 5.3 10e3/uL    Absolute Monocytes 0.5 0.0 - 1.3 10e3/uL    Absolute Eosinophils 0.1 0.0 - 0.7 10e3/uL    Absolute Basophils 0.0 0.0 - 0.2 10e3/uL    Absolute Immature Granulocytes 0.0 <=0.4 10e3/uL    Absolute NRBCs 0.0 10e3/uL   Hemoglobin A1c    Collection Time: 09/19/24  7:19 PM   Result Value Ref Range    Estimated Average Glucose 97 <117 mg/dL    Hemoglobin A1C 5.0 <5.7 %   Alcohol breath test POCT    Collection Time: 09/19/24  9:07 PM   Result Value Ref Range    Alcohol Breath Test 0.23 (A) 0.00 - 0.01   Potassium    Collection Time: 09/20/24  2:37 AM   Result Value Ref Range    Potassium 2.5 (LL) 3.4 - 5.3 mmol/L   Potassium    Collection Time: 09/20/24  4:20 AM   Result Value Ref Range    Potassium 3.4 3.4 - 5.3 mmol/L   Urine Drug Screen Panel    Collection Time: 09/20/24  6:18 AM   Result Value Ref Range    Amphetamines Urine Screen Negative Screen Negative    Barbituates Urine Screen Positive (A) Screen Negative    Benzodiazepine Urine Screen Positive (A) Screen Negative    Cannabinoids Urine Screen Negative Screen Negative    Cocaine Urine Screen Negative Screen Negative    Fentanyl Qual Urine Screen  "Negative Screen Negative    Opiates Urine Screen Negative Screen Negative    PCP Urine Screen Negative Screen Negative   hCG qual urine POCT    Collection Time: 09/20/24  6:23 AM   Result Value Ref Range    HCG Qual Urine Negative Negative    Internal QC Check POCT Valid Valid    POCT Kit Lot Number [id number]     POCT Kit Expiration Date 2026-03-17        BP (!) 138/93   Pulse 86   Temp 97.3  F (36.3  C) (Oral)   Resp 16   Ht 1.702 m (5' 7\")   Wt 69.8 kg (153 lb 14.1 oz)   SpO2 99%   BMI 24.10 kg/m    Weight is 153 lbs 14.1 oz  Body mass index is 24.1 kg/m .         Psychiatric Mental Status Examination:   Appearance: awake, alert, untidy  Attitude: cooperative and pleasant  Eye Contact: good  Mood:  \"anxoius\"  Affect: mood congruent and full range  Speech:  clear, coherent and normal prosody  Language: fluent in English  Psychomotor Behavior:  no evidence of tardive dyskinesia, dystonia, or tics  Gait/Station: normal  Thought Process:  linear, logical, goal oriented  Associations:  no loose associations  Thought Content:  Denying SI/HI/AVH; no evidence of psychotic thinking  Insight:  fair  Judgement: fair  Oriented to:  time, person, and place  Attention Span and Concentration:  intact  Recent and Remote Memory:  intact  Fund of Knowledge: appropriate           Physical Exam:   Please refer to physical exam completed by ED provider, Nabil Landrum DO, on 9/19/24 as below. I agree with the findings and assessment and have no additional findings to add at this time with exception that psychiatric findings now above in MSE.   Physical Exam  Constitutional:       General: She is not in acute distress.     Appearance: Normal appearance. She is not diaphoretic.   HENT:      Head: Atraumatic.      Mouth/Throat:      Mouth: Mucous membranes are moist.   Eyes:      General: No scleral icterus.     Conjunctiva/sclera: Conjunctivae normal.   Cardiovascular:      Rate and Rhythm: Normal rate.      Heart sounds: Normal heart " sounds.   Pulmonary:      Effort: No respiratory distress.      Breath sounds: Normal breath sounds.   Abdominal:      General: Abdomen is flat.   Musculoskeletal:      Cervical back: Neck supple.   Skin:     General: Skin is warm.      Findings: No rash.   Neurological:      Mental Status: She is alert.   Psychiatric:         Thought Content: Thought content includes suicidal ideation. Thought content does not include homicidal ideation. Thought content does not include suicidal plan.

## 2024-09-20 NOTE — TELEPHONE ENCOUNTER
R:  Per call to ED, pt's RN reports pt told ED MD that she has changed her mind and does want detox.  Contacted  who states its OK for pt to admit.  She requests they try and make sure she won't change her mind once on the unit.   [facility] CRJULIO and ED MD Lala informed.

## 2024-09-20 NOTE — CONSULTS
Diagnostic Evaluation Consultation  Crisis Assessment    Patient Name: Tati Hamilton  Age:  43 year old  Legal Sex: female  Gender Identity: female  Pronouns:   Race: White  Ethnicity: Not  or   Language: English      Patient was assessed: In person   Crisis Assessment Start Date: 09/19/24  Crisis Assessment Start Time: 2322  Crisis Assessment Stop Time: 2352  Patient location: [facility] EMERGENCY DEPARTMENT                             [id number]    Referral Data and Chief Complaint  Tati Hamilton presents to the ED with family/friends. Patient is presenting to the ED for the following concerns: Suicidal ideation, Other (see comment) (Alcohol intoxication).   Factors that make the mental health crisis life threatening or complex are:  Pt was brought the ER by her ex-partner, presenting with alcohol intoxication and suicidal ideation. During the interview, pt reported she was no longer suicidal but at risk of resuming alcohol use if discharged while still experiencing symptoms of withdrawal. Pt requested to detox before returning home, which attending physician supported and arranged. Pt denied HI/SIB and appears cleared for detox.      Informed Consent and Assessment Methods  Explained the crisis assessment process, including applicable information disclosures and limits to confidentiality, assessed understanding of the process, and obtained consent to proceed with the assessment.  Assessment methods included conducting a formal interview with patient, review of medical records, collaboration with medical staff, and obtaining relevant collateral information from family and community providers when available.  : done     Patient response to interventions: eager to participate, acceptance expressed, verbalizes understanding  Coping skills were attempted to reduce the crisis:  Help-seeking; cooperative during assessment.     History of the Crisis   Pt reported stress at work contributed  to a relapse after 8-9 months of sobriety, and that alcohol use led her to quitting her job, which she now regrets, and that her depressive symptoms led to her binge drinking for the last week. Pt reported that Rx for anxiety (e.g., Hydroxyzine, Buspar, Lorazepam PRN) were not effective/sufficient for work stress which led to her feeling overwhelmed.    Brief Psychosocial History  Family:  , Children    Support System:  Parent(s), Other (specify) (Ex)  Employment Status:  employment seeking  Source of Income:  other (see comments) (Savings)  Financial Environmental Concerns:  other (see comments) (At risk of losing insurance/housing if unable to obtain new work.)  Current Hobbies:  group/social activities  Barriers in Personal Life:  mental health concerns    Significant Clinical History  Current Anxiety Symptoms:  anxious  Current Depression/Trauma:  thoughts of death/suicide, sadness, impaired decision making  Current Somatic Symptoms:  anxious  Current Psychosis/Thought Disturbance:  impulsive  Current Eating Symptoms:     Chemical Use History:  Alcohol: Binge  Last Use:: 09/19/24  Benzodiazepines: None  Opiates: None  Cocaine: None  Marijuana: None  Other Use: None   Past diagnosis:  Anxiety Disorder, Depression  Family history:     Past treatment:  Individual therapy, AA/NA, Psychiatric Medication Management, Primary Care  Details of most recent treatment:  Met with PCP in August re: Rx.  Other relevant history:          Collateral Information  Is there collateral information: No (Pt requested collateral not contacted.)      Risk Assessment  [place] Suicide Severity Rating Scale Full Clinical Version:  Suicidal Ideation  Q1 Wish to be Dead (Lifetime): Yes  Q2 Non-Specific Active Suicidal Thoughts (Lifetime): Yes  3. Active Suicidal Ideation with any Methods (Not Plan) Without Intent to Act (Lifetime): No  Q4 Active Suicidal Ideation with Some Intent to Act, Without Specific Plan (Lifetime): No  Q5  Active Suicidal Ideation with Specific Plan and Intent (Lifetime): No  Q6 Suicide Behavior (Lifetime): no     Suicidal Behavior (Lifetime)  Actual Attempt (Lifetime): No  Has subject engaged in non-suicidal self-injurious behavior? (Lifetime): No  Interrupted Attempts (Lifetime): No  Aborted or Self-Interrupted Attempt (Lifetime): No  Preparatory Acts or Behavior (Lifetime): No    [place] Suicide Severity Rating Scale Recent:   Suicidal Ideation (Recent)  Q1 Wished to be Dead (Past Month): yes  Q2 Suicidal Thoughts (Past Month): yes  Q3 Suicidal Thought Method: no  Q4 Suicidal Intent without Specific Plan: no  Q5 Suicide Intent with Specific Plan: no  Level of Risk per Screen: low risk  Intensity of Ideation (Recent)  Description of Most Severe Ideation (Past 1 Month): A wish to be dead  Duration (Past 1 Month): Fleeting, few seconds or minutes  Controllability (Past 1 Month): Easily able to control thoughts  Deterrents (Past 1 Month): Deterrents definitely stopped you from attempting suicide  Reasons for Ideation (Past 1 Month): Does not apply  Suicidal Behavior (Recent)  Actual Attempt (Past 3 Months): No  Has subject engaged in non-suicidal self-injurious behavior? (Past 3 Months): No  Interrupted Attempts (Past 3 Months): No  Aborted or Self-Interrupted Attempt (Past 3 Months): No  Preparatory Acts or Behavior (Past 3 Months): No    Environmental or Psychosocial Events: work or task failure, unemployment/underemployment  Protective Factors: Protective Factors: lives in a responsibly safe and stable environment, able to access care without barriers, supportive ongoing medical and mental health care relationships, sense of importance of health and wellness, help seeking, reality testing ability    Does the patient have thoughts of harming others? Feels Like Hurting Others: no  Previous Attempt to Hurt Others: no    Is the patient engaging in sexually inappropriate behavior?           Mental Status Exam   Affect:  Appropriate  Appearance: Disheveled  Attention Span/Concentration: Attentive  Eye Contact: Engaged    Fund of Knowledge: Appropriate   Language /Speech Content: Fluent  Language /Speech Volume: Normal  Language /Speech Rate/Productions: Normal  Recent Memory: Intact  Remote Memory: Intact  Mood: Depressed  Orientation to Person: Yes   Orientation to Place: Yes  Orientation to Time of Day: Yes  Orientation to Date: Yes     Situation (Do they understand why they are here?): Yes  Psychomotor Behavior: Normal  Thought Content: Clear  Thought Form: Intact      Medication  Psychotropic medications:   Medication Orders - Psychiatric (From admission, onward)      Start     Dose/Rate Route Frequency Ordered Stop    09/19/24 2207  diazepam (VALIUM) tablet 5-20 mg         5-20 mg Oral EVERY 30 MIN PRN 09/19/24 2207            Current Facility-Administered Medications   Medication Dose Route Frequency Provider Last Rate Last Admin    diazepam (VALIUM) tablet 5-20 mg  5-20 mg Oral Q30 Min PRN Nabil Landrum, DO   10 mg at 09/19/24 2224    sodium chloride 0.9% BOLUS 1,000 mL  1,000 mL Intravenous Once Nabil Landrum DO 1,000 mL/hr at 09/20/24 0014 1,000 mL at 09/20/24 0014     Current Outpatient Medications   Medication Sig Dispense Refill    busPIRone (BUSPAR) 5 MG tablet Take 1-2 tablets (5-10 mg) by mouth 3 times daily. 90 tablet 2    famotidine (PEPCID) 20 MG tablet Take 1 tablet (20 mg) by mouth 2 times daily 180 tablet 3    hydrOXYzine luis enrique (VISTARIL) 25 MG capsule Take 1-2 capsules (25-50 mg) by mouth nightly as needed for anxiety 180 capsule 1    lisinopril (ZESTRIL) 20 MG tablet Take 1 tablet (20 mg) by mouth 2 times daily 180 tablet 1    LORazepam (ATIVAN) 0.5 MG tablet TAKE ONE TABLET BY MOUTH ONCE DAILY AS NEEDED FOR ANXIETY 30 tablet 0    metoprolol succinate ER (TOPROL XL) 50 MG 24 hr tablet Take 1 tablet (50 mg) by mouth daily 90 tablet 1    order for DME Equipment being ordered: [employer] DREAM  STATION WITH  AND MIRAGE FX FOR HER NASAL MASK WITH CHINSTRAP.  PRESSURE 7-15 CM.      vitamin D2 (ERGOCALCIFEROL) 54177 units (1250 mcg) capsule Take 1 capsule (50,000 Units) by mouth once a week 12 capsule 1         Current Care Team  Patient Care Team:  Gely Le MD as PCP - General (Internal Medicine)  Gely Le MD as Assigned PCP  Audi Celeste MD as Assigned OBGYN Provider    Diagnosis  Patient Active Problem List   Diagnosis Code    CALCULUS OF KIDNEY(aka NEPHROLITHIASIS) N20.0    Multiple sclerosis (H) G35    Papanicolaou smear of cervix with low grade squamous intraepithelial lesion (LGSIL) R87.612    Generalized anxiety disorder F41.1    Vitamin D deficiency E55.9    HTN, goal below 140/90 I10    High risk HPV infection B97.7    CHARLES (obstructive sleep apnea) G47.33    Mild single current episode of major depressive disorder (H24) F32.0    Steatohepatitis K75.81    Osteopenia M85.80    Elevated liver function tests R79.89    Nonalcoholic fatty liver K76.0    GERD (gastroesophageal reflux disease) K21.9    Hypomagnesemia E83.42    Prolonged QT interval R94.31    Weakness R53.1    Unspecified depressive disorder F32.A    Unspecified alcohol-related disorder F10.99       Primary Problem This Admission  F32.A Unspecified depressive disorder  F10.99 Unspecified alcohol-related disorder  F41.1 Generalized anxiety disorder    Clinical Summary and Substantiation of Recommendations   Pt was brought the ER by her ex-partner, presenting with alcohol intoxication and suicidal ideation. During the interview, pt reported she was no longer suicidal but at risk of resuming alcohol use if discharged while still experiencing symptoms of withdrawal. Pt made a plan to remove alcohol from home upon return, check in with her therapist, resume attending AA groups, and to attend an appointment with a prescriber to discuss medications for alcohol cravings. Pt reported stress  at work contributed to a relapse after 8-9 months of sobriety, and that alcohol use led her to quitting her job, which she now regrets, and that her depressive symptoms led to her binge drinking for the last week. Pt preferred to detox before returning home, which attending physician supported and arranged. A virtual medication management appointment has been scheduled for 9/24 at 3 pm to discuss medications for alcohol cravings (e.g., Acamprosate, Naltrexone), with information to be included in pt's discharge documentation.    Patient coping skills attempted to reduce the crisis:  Help-seeking; cooperative during assessment.    Disposition  Recommended disposition: Individual therapy, medication management, resume AA groups.     Reviewed case and recommendations with attending provider. Attending Name: Dr. Landrum       Attending concurs with disposition: yes       Patient and/or validated legal guardian concurs with disposition:   yes       Final disposition:  Detox/discharge    Legal status on admission: Voluntary/Patient has signed consent for treatment    Assessment Details   Total duration spent with the patient: 30 min     CPT code(s) utilized: 04252 - Psychotherapy for Crisis - 60 (30-74*) min    Joe Martinez Psychotherapist  [facility] - Triage & Transition Services  Callback: [phone]

## 2024-09-20 NOTE — PROGRESS NOTES
"Admitted From: ED  Legal Status:  Vol     Diagnosis: Alcohol withdrawal     Circumstances leading up to admission: Pt has been drinking 10-15 shots daily for 2 weeks, after 8 months sober.  She denies any other substance use.  She lives alone, is  and has a 16 year old son living with ex- .  She is currently not working.  She endorses depression and anxiety and sees a therapist and occasionally attends AA.  Pt reports two recent ER visits in the last 2 weeks, one at [facility] where she was given phenobarbital and a second at [facility].  She reports she has an RX from her PCP for Lorazepam 0.5 mg but is only using it 2 times a week.          Psych History: denies SI/SIB/HI/AH/VH  Pt reports that she has suicidal thought only when intoxicated and has been more depressed since she quit her job. She contracts for safety and denies any thoughts plan or intent for self harm.     Per Dec assessment :  Unspecified depressive disorder  Unspecified alcohol-related disorder  Generalized anxiety disorder    Vitals: BP (!) 143/98 (BP Location: Left arm, Patient Position: Chair, Cuff Size: Adult Regular)   Pulse 87   Temp 99  F (37.2  C) (Oral)   Resp 16   Ht 1.702 m (5' 7\")   Wt 69.8 kg (153 lb 14.1 oz)   SpO2 99%   BMI 24.10 kg/m          Allergies: NKDA     Labs:      Latest Reference Range & Units 09/19/24 19:19 09/20/24 02:37 09/20/24 04:20   Potassium 3.4 - 5.3 mmol/L 3.2 (L) 2.5 (LL) 3.4   (LL): Data is critically low  (L): Data is abnormally low       Latest Reference Range & Units 09/20/24 06:18   Amphetamine Qual Urine Screen Negative  Screen Negative   Fentanyl Qual Urine Screen Negative  Screen Negative   Cocaine Urine Screen Negative  Screen Negative   Benzodiazepine Urine Screen Negative  Screen Positive !   Opiates Qualitative Urine Screen Negative  Screen Negative   PCP Urine Screen Negative  Screen Negative   Cannabinoids Qual Urine Screen Negative  Screen Negative   Barbiturates " Qual Urine Screen Negative  Screen Positive !   !: Data is abnormal      Medical:   HTN      Behavior upon arrival: Pt was calm and cooperative upon arrival to the unit, for search, and during admission interview. Affect was flat/blunted.  She appears sleepy but steady on her feet. She is asking about length of stay and would like to discharge tomorrow  if OOD.   MSSA- 5      I left a message for the business office as patient reports that she needs insurance, losing her insurance at the end of the month. .      Plan: Assist pt with finding healthy coping skills, build rapport, encourage medication adherence and monitor for side effects.  Monitor for alcohol withdrawal signs/symptoms. Begin withdrawal precaution

## 2024-09-20 NOTE — ED NOTES
This writer has attempted 2x to collect urine from pt for HcG and Utox. Pt states she is unable to provide urine at this time.

## 2024-09-20 NOTE — PSYCH
Staff called and requested admission for patient who is a 43yoF presenting for alcohol detox, no hx of DTs or withdrawal seizures, last drink was earlier this afternoon. Also presents with SI. K+ replenished in ED. Will place orders for detox admission.

## 2024-09-20 NOTE — PROGRESS NOTES
09/20/24 1447   Patient Belongings   Did you bring any home meds/supplements to the hospital?  No   Patient Belongings other (see comments)   Patient Belongings Remaining with Patient other (see comments)   Patient Belongings Put in Hospital Secure Location (Security or Locker, etc.) other (see comments)   Belongings Search Yes   Clothing Search Yes     Pants w/string, hat in storage  Cell, wallet in med room  NO sharps  $5.00 brittany, blank check 3095, [employer], [employer], [employer], 2 medical in security  A               Admission:  I am responsible for any personal items that are not sent to the safe or pharmacy.  [facility] is not responsible for loss, theft or damage of any property in my possession.    Signature:  _________________________________ Date: _______  Time: _____                                              Staff Signature:  ____________________________ Date: ________  Time: _____      2nd Staff person, if patient is unable/unwilling to sign:    Signature: ________________________________ Date: ________  Time: _____     Discharge:  [facility] has returned all of my personal belongings:    Signature: _________________________________ Date: ________  Time: _____                                          Staff Signature:  ____________________________ Date: ________  Time: _____

## 2024-09-20 NOTE — MEDICATION SCRIBE - ADMISSION MEDICATION HISTORY
Medication Scribe Admission Medication History    Admission medication history is complete. The information provided in this note is only as accurate as the sources available at the time of the update.    Information Source(s): Patient, Hospital records, and CareEverywhere/SureScripts via in-person    Pertinent Information: None.    Changes made to PTA medication list:  Added: Vitamin D 3, 2000 units daily, Vitamin B complex with Vitamin C 1 tablet daily.  Deleted: Vitamin D 2, 50,000 units weekly,   Changed: Lisinopril 20 mg BID changed to Lisinopril 20 mg daily, Metoprolol Succinate 50 mg daily, changed to Metoprolol Succinate 25 mg daily    Allergies reviewed with patient and updates made in EHR: yes    Medication History Completed By: Audi Mosqueda MD 9/20/2024 7:43 AM    PTA Med List   Medication Sig Last Dose    busPIRone (BUSPAR) 5 MG tablet Take 1-2 tablets (5-10 mg) by mouth 3 times daily. Past Week at unknown    famotidine (PEPCID) 20 MG tablet Take 1 tablet (20 mg) by mouth 2 times daily (Patient taking differently: Take 20 mg by mouth 2 times daily as needed (heartburn).) Past Week at unknown    hydrOXYzine luis enrique (VISTARIL) 25 MG capsule Take 1-2 capsules (25-50 mg) by mouth nightly as needed for anxiety Past Week at unknown    lisinopril (ZESTRIL) 20 MG tablet Take 1 tablet (20 mg) by mouth 2 times daily (Patient taking differently: Take 20 mg by mouth daily.) Past Week at unknown    LORazepam (ATIVAN) 0.5 MG tablet TAKE ONE TABLET BY MOUTH ONCE DAILY AS NEEDED FOR ANXIETY Past Week at unknown    metoprolol succinate ER (TOPROL XL) 25 MG 24 hr tablet Take 25 mg by mouth daily. Past Week at unknown    ondansetron (ZOFRAN ODT) 4 MG ODT tab Take 4 mg by mouth every 6 hours as needed for nausea. Past Week at unknown    order for DME Equipment being ordered: [employer] DREAM STATION WITH HH AND [employer] FX FOR HER NASAL MASK WITH CHINSTRAP.  PRESSURE 7-15 CM. Past Week at unknown    vitamin B complex  with vitamin C (VITAMIN  B COMPLEX) tablet Take 1 tablet by mouth daily. Past Week at unknown    vitamin D3 (CHOLECALCIFEROL) 50 mcg (2000 units) tablet Take 1 tablet by mouth daily. Past Week at unknown

## 2024-09-20 NOTE — ED PROVIDER NOTES
ED Provider Note  [facility]      History     Chief Complaint   Patient presents with    Alcohol Problem    Suicidal     HPI  Tati Hamilton is a 43 year old female who presents for alcohol detox.  Drinks approximately 10 shots per day.  They have a history of withdrawal symptoms and have not had a seizure from alcohol in the past.  Last drink was this afternoon.  No other drug use.  She also notes mental health concern, suicidal ideation with no plan.  No other symptoms noted.     Past Medical History  Past Medical History:   Diagnosis Date    Anxiety     Calculus of kidney     Depressive disorder     Depressive disorder, not elsewhere classified     ESTEPHANIA (generalised anxiety disorder)     High risk HPV infection 3/20/15,10/17/16    Not HPV 16 or 18, HR, Not 16 or 18    History of colposcopy 08/05/2020    HTN, goal below 140/90     Lung nodules 05/2010    Needs f/u CT Nov 2010    Multiple sclerosis (H) Dx in 2001    Multiple sclerosis (H)     PONV (postoperative nausea and vomiting)     Vitamin D deficiencies      Past Surgical History:   Procedure Laterality Date    HC COLP CERVIX/UPPER VAGINA W LOOP ELEC BX CERVIX      HC REMOVAL OF TONSILS,<13 Y/O      Tonsils <12y.o.    LAPAROSCOPIC CHOLECYSTECTOMY  5/8/2014    Procedure: LAPAROSCOPIC CHOLECYSTECTOMY;  Surgeon: Sona Giraldo MD;  Location:  OR    [facility] NONSPECIFIC PROCEDURE      2 procedures for kidney stones     busPIRone (BUSPAR) 5 MG tablet  famotidine (PEPCID) 20 MG tablet  hydrOXYzine luis enrique (VISTARIL) 25 MG capsule  lisinopril (ZESTRIL) 20 MG tablet  LORazepam (ATIVAN) 0.5 MG tablet  metoprolol succinate ER (TOPROL XL) 50 MG 24 hr tablet  order for DME  vitamin D2 (ERGOCALCIFEROL) 38476 units (1250 mcg) capsule      Allergies   Allergen Reactions    No Known Drug Allergy      Family History  Family History   Problem Relation Age of Onset    Gallbladder Disease Mother     Other - See Comments Mother         varicose  veins     Hypertension Father     Rectal Cancer Maternal Grandfather     Cerebrovascular Disease Maternal Grandmother     Heart Disease Paternal Grandfather      Social History   Social History     Tobacco Use    Smoking status: Every Day     Current packs/day: 0.50     Average packs/day: 0.5 packs/day for 4.0 years (2.0 ttl pk-yrs)     Types: Cigarettes     Passive exposure: Current    Smokeless tobacco: Never    Tobacco comments:     socially   Vaping Use    Vaping status: Never Used   Substance Use Topics    Alcohol use: Yes     Alcohol/week: 0.0 standard drinks of alcohol     Comment: occasional    Drug use: No      Past medical history, past surgical history, medications, allergies, family history, and social history were reviewed with the patient. No additional pertinent items.   A medically appropriate review of systems was performed with pertinent positives and negatives noted in the HPI, and all other systems negative.    Physical Exam   BP: (!) 127/93  Pulse: 117  Temp: 97.9  F (36.6  C)  Resp: 16  SpO2: 98 %  Physical Exam  Constitutional:       General: She is not in acute distress.     Appearance: Normal appearance. She is not diaphoretic.   HENT:      Head: Atraumatic.      Mouth/Throat:      Mouth: Mucous membranes are moist.   Eyes:      General: No scleral icterus.     Conjunctiva/sclera: Conjunctivae normal.   Cardiovascular:      Rate and Rhythm: Normal rate.      Heart sounds: Normal heart sounds.   Pulmonary:      Effort: No respiratory distress.      Breath sounds: Normal breath sounds.   Abdominal:      General: Abdomen is flat.   Musculoskeletal:      Cervical back: Neck supple.   Skin:     General: Skin is warm.      Findings: No rash.   Neurological:      Mental Status: She is alert.   Psychiatric:         Thought Content: Thought content includes suicidal ideation. Thought content does not include homicidal ideation. Thought content does not include suicidal plan.           ED Course,  Procedures, & Data      Procedures                Results for orders placed or performed during the hospital encounter of 09/19/24   Comprehensive metabolic panel     Status: Abnormal   Result Value Ref Range    Sodium 143 135 - 145 mmol/L    Potassium 3.2 (L) 3.4 - 5.3 mmol/L    Carbon Dioxide (CO2) 27 22 - 29 mmol/L    Anion Gap 14 7 - 15 mmol/L    Urea Nitrogen 6.2 6.0 - 20.0 mg/dL    Creatinine 0.75 0.51 - 0.95 mg/dL    GFR Estimate >90 >60 mL/min/1.73m2    Calcium 9.3 8.8 - 10.4 mg/dL    Chloride 102 98 - 107 mmol/L    Glucose 109 (H) 70 - 99 mg/dL    Alkaline Phosphatase 147 40 - 150 U/L    AST 46 (H) 0 - 45 U/L    ALT 39 0 - 50 U/L    Protein Total 7.5 6.4 - 8.3 g/dL    Albumin 4.3 3.5 - 5.2 g/dL    Bilirubin Total 0.5 <=1.2 mg/dL   Magnesium     Status: Normal   Result Value Ref Range    Magnesium 1.9 1.7 - 2.3 mg/dL   CBC with platelets and differential     Status: Abnormal   Result Value Ref Range    WBC Count 7.9 4.0 - 11.0 10e3/uL    RBC Count 4.01 3.80 - 5.20 10e6/uL    Hemoglobin 13.3 11.7 - 15.7 g/dL    Hematocrit 37.3 35.0 - 47.0 %    MCV 93 78 - 100 fL    MCH 33.2 (H) 26.5 - 33.0 pg    MCHC 35.7 31.5 - 36.5 g/dL    RDW 14.5 10.0 - 15.0 %    Platelet Count 199 150 - 450 10e3/uL    % Neutrophils 53 %    % Lymphocytes 39 %    % Monocytes 6 %    % Eosinophils 1 %    % Basophils 0 %    % Immature Granulocytes 0 %    NRBCs per 100 WBC 0 <1 /100    Absolute Neutrophils 4.2 1.6 - 8.3 10e3/uL    Absolute Lymphocytes 3.1 0.8 - 5.3 10e3/uL    Absolute Monocytes 0.5 0.0 - 1.3 10e3/uL    Absolute Eosinophils 0.1 0.0 - 0.7 10e3/uL    Absolute Basophils 0.0 0.0 - 0.2 10e3/uL    Absolute Immature Granulocytes 0.0 <=0.4 10e3/uL    Absolute NRBCs 0.0 10e3/uL   Alcohol breath test POCT     Status: Abnormal   Result Value Ref Range    Alcohol Breath Test 0.23 (A) 0.00 - 0.01   CBC with platelets differential     Status: Abnormal    Narrative    The following orders were created for panel order CBC with platelets  differential.  Procedure                               Abnormality         Status                     ---------                               -----------         ------                     CBC with platelets and d...[[id number]]  Abnormal            Final result                 Please view results for these tests on the individual orders.     Medications   diazepam (VALIUM) tablet 5-20 mg (10 mg Oral $Given 9/19/24 2224)   diazepam (VALIUM) tablet 5 mg (5 mg Oral $Given 9/19/24 2004)     Labs Ordered and Resulted from Time of ED Arrival to Time of ED Departure   COMPREHENSIVE METABOLIC PANEL - Abnormal       Result Value    Sodium 143      Potassium 3.2 (*)     Carbon Dioxide (CO2) 27      Anion Gap 14      Urea Nitrogen 6.2      Creatinine 0.75      GFR Estimate >90      Calcium 9.3      Chloride 102      Glucose 109 (*)     Alkaline Phosphatase 147      AST 46 (*)     ALT 39      Protein Total 7.5      Albumin 4.3      Bilirubin Total 0.5     CBC WITH PLATELETS AND DIFFERENTIAL - Abnormal    WBC Count 7.9      RBC Count 4.01      Hemoglobin 13.3      Hematocrit 37.3      MCV 93      MCH 33.2 (*)     MCHC 35.7      RDW 14.5      Platelet Count 199      % Neutrophils 53      % Lymphocytes 39      % Monocytes 6      % Eosinophils 1      % Basophils 0      % Immature Granulocytes 0      NRBCs per 100 WBC 0      Absolute Neutrophils 4.2      Absolute Lymphocytes 3.1      Absolute Monocytes 0.5      Absolute Eosinophils 0.1      Absolute Basophils 0.0      Absolute Immature Granulocytes 0.0      Absolute NRBCs 0.0     ALCOHOL BREATH TEST POCT - Abnormal    Alcohol Breath Test 0.23 (*)    MAGNESIUM - Normal    Magnesium 1.9       No orders to display              Assessment & Plan    This is a 43 year old female who presents for alcohol detox.  Last alcohol intake was this afternoon.  Alcohol level is 0.23.  Exam demonstrates no acute abnormalities.  Patient is also been experiencing suicidal ideation without plan was  seen by .  See their note for details.  Patient is cleared for detox.  We will admit for detox.     I have reviewed the nursing notes. I have reviewed the findings, diagnosis, plan and need for follow up with the patient.    New Prescriptions    No medications on file       Final diagnoses:   Alcohol use disorder       Nabil Landrum DO  [facility] EMERGENCY DEPARTMENT  9/19/2024     Nabil Landrum,   09/20/24 0007

## 2024-09-20 NOTE — TELEPHONE ENCOUNTER
S: [facility] , Provider Lucita calling at 12:12 AM with clinical on a 43 year old/Female presenting for alcohol detox.     B: Pt presents for ETOH detox.   Currently reports drinking 10-15 shots daily for long-term.    Patient reports last use was this afternoon around 1400.  Pt DANI: .23  Pt  denies hx of DT  Pt  denies hx of seizures. Last seizure: N/A  Pt endorsing the following symptoms of withdrawal: Shaky  MSSA Score: not reported, but given valium    Pt endorses acute mental health or medical concerns. SI   Pt denies other drug use: None Amount/frequency: N/A    Does Pt have a detox care plan in [facility]? No  Does pt present with specific needs, assistive devices, or exclusionary criteria? None  Is the patient ambulating, eating and drinking in the ED? Yes    A: Pt meets criteria to be presented for IP detox admission. Patient is voluntary    COVID Symptoms: No  If yes, COVID test required   Utox: Not ordered, intake to request lab   Magnesium: WNL  CMP: Abnormalities: K 3.2  CBC: Abnormalities: MCH 33.2  HCG: Not ordered, intake to request lab      R: Patient cleared and ready for behavioral bed placement: Yes    Pt is meeting criteria for presentation to 3A/CD    Does Patient need a Transfer Center request created? No, Pt is located within [facility] ED, [facility] ED, or [place] ED

## 2024-09-21 VITALS
HEIGHT: 67 IN | BODY MASS INDEX: 24.15 KG/M2 | HEART RATE: 77 BPM | TEMPERATURE: 97.8 F | WEIGHT: 153.88 LBS | RESPIRATION RATE: 16 BRPM | OXYGEN SATURATION: 99 % | SYSTOLIC BLOOD PRESSURE: 135 MMHG | DIASTOLIC BLOOD PRESSURE: 96 MMHG

## 2024-09-21 LAB — POTASSIUM SERPL-SCNC: 3.7 MMOL/L (ref 3.4–5.3)

## 2024-09-21 PROCEDURE — 250N000013 HC RX MED GY IP 250 OP 250 PS 637: Performed by: PSYCHIATRY & NEUROLOGY

## 2024-09-21 PROCEDURE — 36415 COLL VENOUS BLD VENIPUNCTURE: CPT | Performed by: PSYCHIATRY & NEUROLOGY

## 2024-09-21 PROCEDURE — 99221 1ST HOSP IP/OBS SF/LOW 40: CPT

## 2024-09-21 PROCEDURE — 84132 ASSAY OF SERUM POTASSIUM: CPT | Performed by: PSYCHIATRY & NEUROLOGY

## 2024-09-21 PROCEDURE — 250N000013 HC RX MED GY IP 250 OP 250 PS 637: Performed by: STUDENT IN AN ORGANIZED HEALTH CARE EDUCATION/TRAINING PROGRAM

## 2024-09-21 PROCEDURE — 99239 HOSP IP/OBS DSCHRG MGMT >30: CPT | Performed by: NURSE PRACTITIONER

## 2024-09-21 RX ORDER — FOLIC ACID 1 MG/1
1 TABLET ORAL DAILY
Qty: 30 TABLET | Refills: 1 | Status: SHIPPED | OUTPATIENT
Start: 2024-09-21

## 2024-09-21 RX ORDER — FAMOTIDINE 20 MG/1
20 TABLET, FILM COATED ORAL 2 TIMES DAILY PRN
Status: SHIPPED
Start: 2024-09-21

## 2024-09-21 RX ORDER — ACAMPROSATE CALCIUM 333 MG/1
666 TABLET, DELAYED RELEASE ORAL 3 TIMES DAILY
Qty: 180 TABLET | Refills: 1 | Status: SHIPPED | OUTPATIENT
Start: 2024-09-21

## 2024-09-21 RX ORDER — MULTIPLE VITAMINS W/ MINERALS TAB 9MG-400MCG
1 TAB ORAL DAILY
Status: SHIPPED
Start: 2024-09-21

## 2024-09-21 RX ORDER — LANOLIN ALCOHOL/MO/W.PET/CERES
100 CREAM (GRAM) TOPICAL DAILY
Qty: 30 TABLET | Refills: 1 | Status: SHIPPED | OUTPATIENT
Start: 2024-09-21

## 2024-09-21 RX ORDER — LISINOPRIL 20 MG/1
20 TABLET ORAL DAILY
Status: SHIPPED
Start: 2024-09-21

## 2024-09-21 RX ADMIN — THIAMINE HCL TAB 100 MG 100 MG: 100 TAB at 08:22

## 2024-09-21 RX ADMIN — METOPROLOL SUCCINATE 25 MG: 25 TABLET, FILM COATED, EXTENDED RELEASE ORAL at 10:23

## 2024-09-21 RX ADMIN — Medication 1 TABLET: at 08:22

## 2024-09-21 RX ADMIN — FOLIC ACID 1 MG: 1 TABLET ORAL at 08:22

## 2024-09-21 RX ADMIN — Medication 50 MCG: at 08:22

## 2024-09-21 RX ADMIN — LISINOPRIL 20 MG: 20 TABLET ORAL at 10:23

## 2024-09-21 ASSESSMENT — ACTIVITIES OF DAILY LIVING (ADL)
ADLS_ACUITY_SCORE: 28

## 2024-09-21 NOTE — PLAN OF CARE
Problem: Adult Inpatient Plan of Care  Goal: Plan of Care Review  Description: The Plan of Care Review/Shift note should be completed every shift.  The Outcome Evaluation is a brief statement about your assessment that the patient is improving, declining, or no change.  This information will be displayed automatically on your shift  note.  Outcome: Adequate for Care Transition     Problem: Alcohol Withdrawal  Goal: Alcohol Withdrawal Symptom Control  Outcome: Adequate for Care Transition   Goal Outcome Evaluation:       Patient scored less than 8 on MSSA and has not required medication for alcohol withdrawal for 24 hours and is removed from alcohol withdrawal protocol.

## 2024-09-21 NOTE — CONSULTS
[facility]  Consult Note - Hospitalist Service  Date of Admission:  9/19/2024  Consult Requested by: Dr. Syed  Reason for Consult: Alcohol detox    Assessment & Plan   Tati Hamilton is a 43 year old female with a history of alcohol use disorder, hypertension, anxiety and depression, who was admitted to [facility] on 9/19/2024 for alcohol detox. Medicine was consulted for medical co-management.    Alcohol Withdrawal  Alcohol Use Disorder  Patient reports drinking ~10 drinks daily for the last 2 weeks. Alcohol breath level of 0.23 on admission. Denies history of withdrawal seizure or DT. MSSA of 4 this shift thus far.  - Per psychiatry   - Agree with folic acid, multivitamin, and thiamine   - Agree with MSSA protocol    Hypokalemia, resolved On admission, patient with a potassium of 2.5. Received replacement in the ED with subsequent improvement.   Hypertension Continue PTA on lisinopril and metoprolol.   Anxiety, depression Per psych.       The patient's care was discussed with the Patient, and Primary team via this note.    Medicine will sign off now. Thank you for allowing us to be a part of this patient's care. Please notify on call RENZO if any medical issues arise.     Clinically Significant Risk Factors Present on Admission        # Hypokalemia: Lowest K = 2.5 mmol/L in last 2 days, will replace as needed           # Hypertension: Noted on problem list             # Financial/Environmental Concerns: other (see comments) (At risk of losing insurance/housing if unable to obtain new work.)         Marlyn Rivera PA-C  Hospitalist Service  Securely message with [employer] (more info)  Text page via [facility] Paging/Directory   ______________________________________________________________________    Chief Complaint   Alcohol detox    History is obtained from the patient and patient's chart    History of Present Illness   Tati Hamilton is a 43 year old female with  a history of alcohol use disorder, hypertension, anxiety and depression, who was admitted to [facility] on 9/19/2024 for alcohol detox. Medicine was consulted for medical co-management.    Patient reports she is feeling well. Planning to discharge today. Denies any acute complaints.     Past Medical History    Past Medical History:   Diagnosis Date    Anxiety     Calculus of kidney     Depressive disorder     Depressive disorder, not elsewhere classified     ESTEPHANIA (generalised anxiety disorder)     High risk HPV infection 3/20/15,10/17/16    Not HPV 16 or 18, HR, Not 16 or 18    History of colposcopy 08/05/2020    HTN, goal below 140/90     Lung nodules 05/2010    Needs f/u CT Nov 2010    Multiple sclerosis (H) Dx in 2001    Multiple sclerosis (H)     PONV (postoperative nausea and vomiting)     Vitamin D deficiencies        Past Surgical History   Past Surgical History:   Procedure Laterality Date    HC COLP CERVIX/UPPER VAGINA W LOOP ELEC BX CERVIX      HC REMOVAL OF TONSILS,<11 Y/O      Tonsils <12y.o.    LAPAROSCOPIC CHOLECYSTECTOMY  5/8/2014    Procedure: LAPAROSCOPIC CHOLECYSTECTOMY;  Surgeon: Sona Giraldo MD;  Location:  OR    [facility] NONSPECIFIC PROCEDURE      2 procedures for kidney stones       Medications   I have reviewed this patient's current medications        Physical Exam   Vital Signs: Temp: 97.8  F (36.6  C) Temp src: Temporal BP: (!) 135/96 Pulse: 77   Resp: 16 SpO2: 99 % O2 Device: None (Room air)    Weight: 153 lbs 14.1 oz    General Appearance: Comfortable, nontoxic appearing female seen laying in bed.  Eyes: PERRLA.  No conjunctival icterus.  HEENT: Atraumatic.  Respiratory: Breathing comfortably on room air.    Lymph/Hematologic: No bruising on exposed skin.  Skin: No lesions or rashes noted on exposed skin.  Musculoskeletal: Moving all extremities spontaneously.  Neurologic: Cranial nerves II through XII grossly intact.  Psychiatric: Mood appropriate.    Medical Decision Making        35 MINUTES SPENT BY ME on the date of service doing chart review, history, exam, documentation & further activities per the note.      Data   Recent Labs   Lab 09/21/24  0802 09/20/24  0420 09/20/24  0237 09/19/24  1919 09/14/24  2200   WBC  --   --   --  7.9 7.9   HGB  --   --   --  13.3 13.0   MCV  --   --   --  93 91   PLT  --   --   --  199 209   NA  --   --   --  143 139   POTASSIUM 3.7 3.4 2.5* 3.2* 3.5   CHLORIDE  --   --   --  102 96*   CO2  --   --   --  27 21*   BUN  --   --   --  6.2 12.1   CR  --   --   --  0.75 0.72   ANIONGAP  --   --   --  14 22*   MYRANDA  --   --   --  9.3 8.7*   GLC  --   --   --  109* 98   ALBUMIN  --   --   --  4.3 4.4   PROTTOTAL  --   --   --  7.5 7.3   BILITOTAL  --   --   --  0.5 0.7   ALKPHOS  --   --   --  147 148   ALT  --   --   --  39 18   AST  --   --   --  46* 35   LIPASE  --   --   --   --  17

## 2024-09-21 NOTE — PLAN OF CARE
Behavioral Team Discussion: (9/21/2024)    Continued Stay Criteria/Rationale: Patient admitted for Chemical Use Issues.  Plan: The following services will be provided to the patient; psychiatric assessment, medication management, therapeutic milieu, individual and group support, and skills groups.   Participants: 3A Provider: Dr. Thalia Syed MD; 3A RN: Simran Epstein RN; 3A CM's:  OSKAR Watters .  Summary/Recommendation: Providers will assess today for treatment recommendations, discharge planning, and aftercare plans. CM will meet with pt for discharge planning.   Medical/Physical: No acute medical concerns reported.  Precautions:   Behavioral Orders   Procedures    Code 1 - Restrict to Unit    Routine Programming     As clinically indicated    Status 15     Every 15 minutes.    Suicide precautions: Suicide Risk: LOW     Patients on Suicide Precautions should have a Combination Diet ordered that includes a Diet selection(s) AND a Behavioral Tray selection for Safe Tray - with utensils, or Safe Tray - NO utensils       Order Specific Question:   Suicide Risk     Answer:   LOW    Withdrawal precautions     Rationale for change in precautions or plan: N/A  Progress: Initial.    ASAM Dimension Scale Ratings:  Dimension 1: 1 Client can tolerate and cope with withdrawal discomfort. The client displays mild to moderate intoxication or signs and symptoms interfering with daily functioning but does not immediately endanger self or others. Client poses minimal risk of severe withdrawal.  Dimension 2: 1 Client tolerates and cresencio with physical discomfort and is able to get the services that the client needs.  Dimension 3: 2 Client has difficulty with impulse control and lacks coping skills. Client has thoughts of suicide or harm to others without means; however, the thoughts may interfere with participation in some treatment activities. Client has difficulty functioning in significant life areas. Client has  moderate symptoms of emotional, behavioral, or cognitive problems. Client is able to participate in most treatment activities.  Dimension 4: 2 Client displays verbal compliance, but lacks consistent behaviors; has low motivation for change; and is passively involved in treatment.  Dimension 5: 4 No awareness of the negative impact of mental health problems or substance abuse. No coping skills to arrest mental health or addiction illnesses, or prevent relapse.  Dimension 6: 3 Client is not engaged in structured, meaningful activity and the client's peers, family, significant other, and living environment are unsupportive, or there is significant criminal justice system involvement.
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
## 2024-09-21 NOTE — DISCHARGE SUMMARY
Psychiatric Discharge Summary    Tati Hamilton MRN# [id number]   Age: 43 year old YOB: 1980     Date of Admission:  9/19/2024  Date of Discharge:  9/21/2024  Admitting Physician:  Thalia Syed DO  Discharge Physician:  BLANCA Singh CNP (Contact: [phone])         Event Leading to Hospitalization:      From H&P 9/20/2024:    This patient is a 43 year old female with history of alcohol use and anxiety who presented to ED seeking detox from alcohol. Medically cleared in ED, admitted to 3A as voluntary patient. Drinking at least 10 shots alcohol daily, EtOH breath test 0.23(H), UDS positive for benzodiazepines and barbiturates (received in other EDs). MSSA with diazepam started for alcohol withdrawal. Withdrawal precautions in place, denies history of seizures. Admission labs ordered and reviewed those resulted. IM consult placed. Had reported SI when presenting to ED, now denying safety concerns including SI and HI. PTA medications continued as appropriate. Pt reports goals for hospitalization are to complete detox and get outpatient supports including MAT for AUD prior to discharge, unsure if interested in CD treatment. Of note, expressed desire to discharge from ED and not pursue admission, then later changed mind and again agreeable to admit to 3A.      Inpatient psychiatric hospitalization is warranted at this time for safety, stabilization, and possible adjustment in medications.    Sharee is a 43 year old female with history of alcohol use and anxiety who presented to ED seeking detox from alcohol.      Chart review completed including ED notes from this encounter, recent PCP notes, recent ED visit from 9/14 for similar presentation, no previous admissions to 3A.     Per ED physician note: Tati Hamilton is a 43 year old female who presents for alcohol detox.  Drinks approximately 10 shots per day.  They have a history of withdrawal symptoms and have not had a  seizure from alcohol in the past.  Last drink was this afternoon.  No other drug use.  She also notes mental health concern, suicidal ideation with no plan.  No other symptoms noted.      Per DEC assessment: Tati Hamilton presents to the ED with family/friends. Patient is presenting to the ED for the following concerns: Suicidal ideation, Other (see comment) (Alcohol intoxication).   Factors that make the mental health crisis life threatening or complex are:  Pt was brought the ER by her ex-partner, presenting with alcohol intoxication and suicidal ideation. During the interview, pt reported she was no longer suicidal but at risk of resuming alcohol use if discharged while still experiencing symptoms of withdrawal. Pt requested to detox before returning home, which attending physician supported and arranged. Pt denied HI/SIB and appears cleared for detox.     History of the Crisis   Pt reported stress at work contributed to a relapse after 8-9 months of sobriety, and that alcohol use led her to quitting her job, which she now regrets, and that her depressive symptoms led to her binge drinking for the last week. Pt reported that Rx for anxiety (e.g., Hydroxyzine, Buspar, Lorazepam PRN) were not effective/sufficient for work stress which led to her feeling overwhelmed.     Brief Psychosocial History  Family:  , Children    Support System:  Parent(s), Other (specify) (Ex)  Employment Status:  employment seeking  Source of Income:  other (see comments) (Savings)  Financial Environmental Concerns:  other (see comments) (At risk of losing insurance/housing if unable to obtain new work.)  Current Hobbies:  group/social activities  Barriers in Personal Life:  mental health concerns     Significant Clinical History  Current Anxiety Symptoms:  anxious  Current Depression/Trauma:  thoughts of death/suicide, sadness, impaired decision making  Current Somatic Symptoms:  anxious  Current Psychosis/Thought  Disturbance:  impulsive  Current Eating Symptoms:     Chemical Use History:  Alcohol: Binge  Last Use:: 09/19/24  Benzodiazepines: None  Opiates: None  Cocaine: None  Marijuana: None  Other Use: None   Past diagnosis:  Anxiety Disorder, Depression  Family history:     Past treatment:  Individual therapy, AA/NA, Psychiatric Medication Management, Primary Care  Details of most recent treatment:  Met with PCP in August re: Rx.  Other relevant history:        Upon interview: Pt confirms information above, she was seen in the ED, reported that she started drinking alcohol in her 20's and became problematic by mid 20's, had several months of sobriety and then relapsed due to stressors. Drinking at least 10 shots of alcohol per day, denies other substance use. Has tolerance to alcohol, will drink more than intended, binge, and withdrawal symptoms when she stops drinking. Was having some tremors, nausea, sweating but states most of this has improved since being in the ED outside of her nausea. She also states her mood has been down, impacted by her alcohol use and intoxication and had been having some suicidal thoughts but has not had any this morning. She does experience anxiety and anxiety tends to be a trigger for her alcohol use. Denies any HI or AVH. Does take medication for her mood but not sure how effective they are. During interview she started to state that she no longer wanted to pursue admission to detox unit, she was requesting to discharge stating she had outpatient follow up in place from ED and not interested in CD treatment. Writer informed ED team of pts request to discharge. Writer later contacted by intake team that patient now changed her mind again and wanting to pursue admission to detox unit and she was placed back in queue.          Psychiatric Review of Systems:   Depression:   Reports: low mood, guilt, poor appetite   Denies: suicidal ideation, decreased interest, changes in sleep, hopelessness,  helplessness, impaired concentration, decreased energy, irritability.  Lorie:   Reports: none  Denies: sleeplessness, impulsiveness, racing thoughts, increased goal-directed activities, pressured speech, increase in energy  Psychosis:   Reports: none  Denies: visual hallucinations, auditory hallucinations, paranoia, grandiosity, ideas of reference, thought insertions, thought broadcasting.  Anxiety:   Reports: excessive worries that are difficult to control  Denies: panic attacks  PTSD:   Reports: none  OCD:   Reports: none  ED:   Reports: none      See full admission note by Dr. Syed 9/20/2024 for details.           Diagnoses:     Alcohol use disorder, severe, dependence  Alcohol withdrawal, resolved  Nicotine dependence  ESTEPHANIA  Unspecified depression   Suicidal ideation, resolved  Hypertension  Elevated LFTs         Labs & Results:      9/14/24  9:45 PM   Systolic Blood Pressure  mmHg    Diastolic Blood Pressure  mmHg    Ventricular Rate     Atrial Rate     IL Interval  ms 208   QRS Duration  ms 80   QT  ms 340   QTc  ms 443   P Axis  degrees    R AXIS  degrees 120   T Axis  degrees 131   Interpretation ECG Sinus tachycardia  Left posterior fascicular block  Abnormal ECG  When compared with ECG of 17-Oct-2023 13:02,  Left posterior fascicular block is now Present  Confirmed by - EMERGENCY ROOM, PHYSICIAN ([id number]),  LUCIO SALAZAR ([id number]) on 9/17/2024 6:29:40 AM      Latest Reference Range & Units 09/14/24 22:00 09/19/24 19:19 09/19/24 21:07 09/20/24 02:37 09/20/24 04:20   Sodium 135 - 145 mmol/L 139 143      Potassium 3.4 - 5.3 mmol/L 3.5 3.2 (L)  2.5 (LL) 3.4   Chloride 98 - 107 mmol/L 96 (L) 102      Carbon Dioxide (CO2) 22 - 29 mmol/L 21 (L) 27      Urea Nitrogen 6.0 - 20.0 mg/dL 12.1 6.2      Creatinine 0.51 - 0.95 mg/dL 0.72 0.75      GFR Estimate >60 mL/min/1.73m2 >90 >90      Calcium 8.8 - 10.4 mg/dL 8.7 (L) 9.3      Anion Gap 7 - 15 mmol/L 22 (H) 14      Magnesium 1.7 - 2.3 mg/dL 1.8  1.9      Albumin 3.5 - 5.2 g/dL 4.4 4.3      Protein Total 6.4 - 8.3 g/dL 7.3 7.5      Alkaline Phosphatase 40 - 150 U/L 148 147      ALT 0 - 50 U/L 18 39      AST 0 - 45 U/L 35 46 (H)      Bilirubin Total <=1.2 mg/dL 0.7 0.5      Glucose 70 - 99 mg/dL 98 109 (H)      HCG Qualitative Serum Negative  Negative       Estimated Average Glucose <117 mg/dL  97      Hemoglobin A1C <5.7 %  5.0      Lipase 13 - 60 U/L 17       WBC 4.0 - 11.0 10e3/uL 7.9 7.9      Hemoglobin 11.7 - 15.7 g/dL 13.0 13.3      Hematocrit 35.0 - 47.0 % 37.1 37.3      Platelet Count 150 - 450 10e3/uL 209 199      RBC Count 3.80 - 5.20 10e6/uL 4.06 4.01      MCV 78 - 100 fL 91 93      MCH 26.5 - 33.0 pg 32.0 33.2 (H)      MCHC 31.5 - 36.5 g/dL 35.0 35.7      RDW 10.0 - 15.0 % 13.6 14.5      % Neutrophils % 55 53      % Lymphocytes % 39 39      % Monocytes % 5 6      % Eosinophils % 0 1      % Basophils % 0 0      Absolute Basophils 0.0 - 0.2 10e3/uL 0.0 0.0      Absolute Eosinophils 0.0 - 0.7 10e3/uL 0.0 0.1      Absolute Immature Granulocytes <=0.4 10e3/uL 0.0 0.0      Absolute Lymphocytes 0.8 - 5.3 10e3/uL 3.1 3.1      Absolute Monocytes 0.0 - 1.3 10e3/uL 0.4 0.5      % Immature Granulocytes % 1 0      Absolute Neutrophils 1.6 - 8.3 10e3/uL 4.4 4.2      Absolute NRBCs 10e3/uL 0.0 0.0      NRBCs per 100 WBC <1 /100 0 0      Alcohol Breath Test 0.00 - 0.01    0.23 !     Alcohol ethyl <=0.01 g/dL 0.20 (H)          Latest Reference Range & Units 09/20/24 06:18 09/20/24 06:23 09/20/24 18:00 09/21/24 08:02   Potassium 3.4 - 5.3 mmol/L    3.7   GGT 5 - 36 U/L   53 (H)    HCG Qual Urine Negative   Negative     TSH 0.30 - 4.20 uIU/mL   1.97    Amphetamine Qual Urine Screen Negative  Screen Negative      Fentanyl Qual Urine Screen Negative  Screen Negative      Cocaine Urine Screen Negative  Screen Negative      Benzodiazepine Urine Screen Negative  Screen Positive !      Opiates Qualitative Urine Screen Negative  Screen Negative      PCP Urine Screen  Negative  Screen Negative      Cannabinoids Qual Urine Screen Negative  Screen Negative      Barbiturates Qual Urine Screen Negative  Screen Positive !               Consults:     Internal Medicine Consult 9/21/2024:    Assessment & Plan    Tati Hamilton is a 43 year old female with a history of alcohol use disorder, hypertension, anxiety and depression, who was admitted to [facility] on 9/19/2024 for alcohol detox. Medicine was consulted for medical co-management.     Alcohol Withdrawal  Alcohol Use Disorder  Patient reports drinking ~10 drinks daily for the last 2 weeks. Alcohol breath level of 0.23 on admission. Denies history of withdrawal seizure or DT. MSSA of 4 this shift thus far.  - Per psychiatry               - Agree with folic acid, multivitamin, and thiamine               - Agree with MSSA protocol     Hypokalemia, resolved On admission, patient with a potassium of 2.5. Received replacement in the ED with subsequent improvement.   Hypertension Continue PTA on lisinopril and metoprolol.   Anxiety, depression Per psych.           Hospital Course:     Tati Hamilton was admitted to Station 3[facility] [place] with attending Thalia Syed DO as a voluntary patient. The patient was placed under status 15 (15 minute checks) to ensure patient safety.     MSSA protocol was initiated due to the patient's history of alcohol abuse and concern for withdrawal symptoms.  She received 10 mg of Valium on day 1 and 30 mg of Valium on day 2.  Thereafter her withdrawal subsided, and the MSSA was discontinued.      Prior to admission medications were continued with the exception of PRN Ativan.  Campral 666 mg TID was initiated.      Tati Hamilton spent time in the milieu.  She was calm and cooperative during interactions with staff.  She was offered the opportunity for CD treatment and declined.  She stated her intent to attend AA.      She rated both anxiety and depression 5/10.  She denied suicidal ideation.  She  felt more hopeful.  She was future-oriented.      Tati Hamilton was discharged to home.  At the time of discharge Tati Hamilton was determined to not be a danger to herself or others.          Discharge Medications:       Dose / Directions   acamprosate 333 MG EC tablet  Commonly known as: CAMPRAL      Dose: 666 mg  Take 2 tablets (666 mg) by mouth 3 times daily.  Quantity: 180 tablet  Refills: 1     folic acid 1 MG tablet  Commonly known as: FOLVITE      Dose: 1 mg  Take 1 tablet (1 mg) by mouth daily.  Quantity: 30 tablet  Refills: 1     multivitamin w/minerals tablet      Dose: 1 tablet  Take 1 tablet by mouth daily.  Refills: 0     thiamine 100 MG tablet  Commonly known as: B-1      Dose: 100 mg  Take 1 tablet (100 mg) by mouth daily.  Quantity: 30 tablet  Refills: 1       busPIRone 5 MG tablet  Commonly known as: BUSPAR  r      Dose: 5-10 mg  Take 1-2 tablets (5-10 mg) by mouth 3 times daily.  Quantity: 90 tablet  Refills: 2     famotidine 20 MG tablet  Commonly known as: PEPCID     Dose: 20 mg  Take 1 tablet (20 mg) by mouth 2 times daily as needed (heartburn).     hydrOXYzine luis enrique 25 MG capsule  Commonly known as: VISTARIL     Dose: 25-50 mg  Take 1-2 capsules (25-50 mg) by mouth nightly as needed for anxiety     lisinopril 20 MG tablet  Commonly known as: ZESTRIL    Dose: 20 mg  Take 1 tablet (20 mg) by mouth daily.     LORazepam 0.5 MG tablet  Commonly known as: ATIVAN TAKE ONE TABLET BY MOUTH ONCE DAILY AS NEEDED FOR ANXIETY     metoprolol succinate ER 25 MG 24 hr tablet  Commonly known as: TOPROL XL    Dose: 25 mg  Take 25 mg by mouth daily.     ondansetron 4 MG ODT tab  Commonly known as: ZOFRAN ODT      Dose: 4 mg  Take 4 mg by mouth every 6 hours as needed for nausea.     vitamin B complex with vitamin C tablet      Dose: 1 tablet  Take 1 tablet by mouth daily.     vitamin D3 50 mcg (2000 units) tablet  Commonly known as: CHOLECALCIFEROL    Dose: 1 tablet  Take 1 tablet by mouth daily.    "      These medications were sent to [facility]acy [place][id number] [place] - [address]  [id number], [facility] [address]      Phone: [phone]   acamprosate 333 MG EC tablet  folic acid 1 MG tablet  thiamine 100 MG tablet         Psychiatric Examination:     Appearance: awake, alert, dressed in scrubs  Attitude: cooperative and pleasant  Eye Contact: good  Mood:  less anxious, more hopeful  Affect: mood congruent and full range  Speech:  clear, coherent and normal prosody  Language: fluent in English  Psychomotor Behavior:  no evidence of tardive dyskinesia, dystonia, or tics  Gait/Station: normal  Thought Process:  linear, logical, goal oriented  Associations:  no loose associations  Thought Content:  denies suicidal and homicidal ideation, no evidence of psychotic thinking  Insight:  fair  Judgement:  fair  Oriented to:  date, time, person, and place  Attention Span and Concentration:  intact  Recent and Remote Memory:  intact  Fund of Knowledge:  appropriate    BP (!) 135/96   Pulse 77   Temp 97.8  F (36.6  C) (Temporal)   Resp 16   Ht 1.702 m (5' 7\")   Wt 69.8 kg (153 lb 14.1 oz)   SpO2 99%   BMI 24.10 kg/m           Discharge Plan:     Per Discharge AVS:    Summary: You were admitted to [facility] on 9/19/2024 for detoxification from alcohol.  A medical exam was performed that included lab work. You have met with a  and opted to discharge to home with plans to attend AA.  Please take care and make your recovery a daily priority, Tati!  It was a pleasure working with you and the entire treatment team here wishes you the very best in your recovery!     Recommendation:  Continue to attend 2-3 weekly 12 step meetings and meet with your therapist. If you feel you are in need of chemical health treatment please contact [facility] to complete and assessment and follow any and all recommendations.     [facility] - Assessments by " appointment.  [address] [place] [address]  [phone]    To complete a [place] application please follow the following website - www.mnsure.org  To apply for Snap benefit please follow the following website - [place]nb[place]its.[place].gov    Major Treatments, Procedures and Findings:  You were treated for alcohol detoxification using Valium. You have met with a  to develop a treatment plan for discharge. You had labs drawn and those results were reviewed with you. Please take a copy of your lab work with you to your next primary care provider appointment.    Symptoms to Report:  If you experience more anxiety, confusion, sleeplessness, deep sadness or thoughts of suicide, notify your treatment team or notify your primary care provider. IF ANY OF THE SYMPTOMS YOU ARE EXPERIENCING ARE A MEDICAL EMERGENCY CALL 911 IMMEDIATELY.     Lifestyle Adjustment: Adjust your lifestyle to get enough sleep, relaxation, exercise and good nutrition. Continue to develop healthy coping skills to decrease stress and promote a sober living environment. Do not use mood altering substances including alcohol, illegal drugs or addictive medications other than what is currently prescribed.     Disposition: Home    Follow-up Appointment:     Primary Care- You will schedule a follow up appointment with your primary care provider within 30 days of discharge.     [facility]  Located in: [facility]  Address: [place] [id number]  Phone: [phone]    Psychiatry:    Date: Tuesday, 9/24/2024  Time: 3:00 pm - 4:00 pm  Provider: Dionisio MCCLAIN  CNP,PMARIASP,RN  Location: [address] [place] [address]  Phone: [phone]  Type: Telepsychiatry    Patient Instructions:    All Intake appointments will be conducted via telehealth and must have access to video through smart phone or laptop/pc/tablet. You will be contacted by our office  to set up the virtual meeting. If you have questions, please contact our office at [phone].      Attestation:  The patient has been seen and evaluated by me, BLANCA Singh CNP  Discharge time > 30 minutes

## 2024-09-21 NOTE — PROGRESS NOTES
"Patient discharged to home.  Reports being picked up by public transportation, \"[employer].\" Patient escorted off the unit by psych associateJeannie        All patient belongings from room, locked bin and security were sent with patient. This RN went over discharge instructions, teachings, patient's labs, and discharge  recommendations. This RN went over patient's prescribed medications being sent home with patient from pharmacy. Patient verbalizes and demonstrates understanding of all teachings. Patient denies thoughts of harm towards self or others.  Pt denies any current medical issues or questions and is now discharged.  "

## 2024-09-21 NOTE — PLAN OF CARE
Problem: Alcohol Withdrawal  Goal: Alcohol Withdrawal Symptom Control  9/21/2024 0229 by Angel Toledo RN  Outcome: Progressing     Problem: Sleep Disturbance  Goal: Adequate Sleep/Rest  Outcome: Progressing   Goal Outcome Evaluation:    Plan of Care Reviewed With: patient      Pt was monitored for alcohol withdrawal symptoms.  Pt denied symptoms stating she feels fine, just happy to be sleeping.  MSSA 1. No valium given. Last valium received on the AM shift yesterday.    B/P: 144/97, T: 97.5, P: 67, R: 16

## 2024-09-22 ENCOUNTER — TELEPHONE (OUTPATIENT)
Dept: BEHAVIORAL HEALTH | Facility: CLINIC | Age: 44
End: 2024-09-22
Payer: COMMERCIAL

## 2024-09-22 NOTE — TELEPHONE ENCOUNTER
Triage and Transition Services- Patient Follow Up Call  Service Line Making Phone Call: Extended Care    Who did Writer Talk to: Patient    Details of Call: LVM for patient regarding follow up, left EC number if they would like additional assistance. No further follow-up is needed.     Kya Barrientos 9/22/2024 12:08 PM

## 2024-09-23 ENCOUNTER — PATIENT OUTREACH (OUTPATIENT)
Dept: CARE COORDINATION | Facility: CLINIC | Age: 44
End: 2024-09-23
Payer: COMMERCIAL

## 2024-09-23 NOTE — PROGRESS NOTES
Clinic Care Coordination Contact  Care Team Conversations    [facility]. Writer also emailed [facility] website and contact information to patient.    ANJELICA Dunham   Social Work Clinic Care Coordinator   [facility]  PH: [phone]  [email or web address]

## 2024-09-23 NOTE — PROGRESS NOTES
Clinic Care Coordination Contact  Transitions of Care Outreach  Chief Complaint   Patient presents with    Clinic Care Coordination - Post Hospital       Most Recent Admission Date: 9/19/2024   Most Recent Admission Diagnosis: Alcohol use disorder - F10.90     Most Recent Discharge Date: 9/21/2024   Most Recent Discharge Diagnosis: Alcohol use disorder - F10.90  Gastroesophageal reflux disease with esophagitis without hemorrhage - K21.00  HTN, goal below 140/90 - I10     Transitions of Care Assessment    Discharge Assessment  How are you doing now that you are home?: Patient shares that she is doing well but has some questions about applying for health insurance. Writer was able to talk patient through some of it but she still needs more direction. Writer offered to send referral to [facility] for assistance and patient is in agreement with this plan. We did discuss CC but patient is not in need of this right now.  How are your symptoms? (Red Flag symptoms escalate to triage hotline per guidelines): Improved  Do you know how to contact your clinic care team if you have future questions or changes to your health status? : Yes  Does the patient have their discharge instructions? : Yes  Does the patient have questions regarding their discharge instructions? : No  Were you started on any new medications or were there changes to any of your previous medications? : Yes  Does the patient have all of their medications?: Yes  Do you have questions regarding any of your medications? : No  Do you have all of your needed medical supplies or equipment (DME)?  (i.e. oxygen tank, CPAP, cane, etc.): Yes    Post-op ([facility] CTA Only)  If the patient had a surgery or procedure, do they have any questions for a nurse?: No    Post-op (Clinicians Only)  Did the patient have surgery or a procedure: No    [facility] Explained and offered Care Coordination support to eligible patients: Yes    Patient accepted? No    Follow up Plan     Discharge  Follow-Up  Discharge follow up appointment scheduled in alignment with recommended follow up timeframe or Transitions of Risk Category? (Low = within 30 days; Moderate= within 14 days; High= within 7 days): Yes  Discharge Follow Up Appointment Date: 09/24/24  Discharge Follow Up Appointment Scheduled with?: Specialty Care Provider (Psychiatry)    No future appointments.    Outpatient Plan as outlined on AVS reviewed with patient.    For any urgent concerns, please contact our 24 hour nurse triage line: [phone] ([phone])       ANJELICA Flores

## 2024-10-08 DIAGNOSIS — I10 HTN, GOAL BELOW 140/90: ICD-10-CM

## 2024-10-10 RX ORDER — LISINOPRIL 20 MG/1
20 TABLET ORAL 2 TIMES DAILY
Qty: 180 TABLET | Refills: 0 | Status: SHIPPED | OUTPATIENT
Start: 2024-10-10 | End: 2024-10-18

## 2024-10-18 ENCOUNTER — TELEPHONE (OUTPATIENT)
Dept: INTERNAL MEDICINE | Facility: CLINIC | Age: 44
End: 2024-10-18
Payer: MEDICAID

## 2024-10-18 DIAGNOSIS — I10 HTN, GOAL BELOW 140/90: ICD-10-CM

## 2024-10-18 NOTE — TELEPHONE ENCOUNTER
Sharee states that she is taking her lisinopril just once a day.  She has plenty of medication currently but we just need the prescription updated.     Thank you,  Susan Brush PharmD  [facility] [place]

## 2024-10-29 NOTE — TELEPHONE ENCOUNTER
Call to patient. States her blood pressure has been well controlled taking 20 mg once daily. Recent BPs have been 130/80 and 124/82.

## 2024-11-01 RX ORDER — LISINOPRIL 20 MG/1
20 TABLET ORAL DAILY
Qty: 180 TABLET | Refills: 1 | Status: SHIPPED | OUTPATIENT
Start: 2024-11-01

## 2024-12-26 ENCOUNTER — PATIENT OUTREACH (OUTPATIENT)
Dept: CARE COORDINATION | Facility: CLINIC | Age: 44
End: 2024-12-26
Payer: MEDICAID

## 2024-12-29 ENCOUNTER — MYC REFILL (OUTPATIENT)
Dept: INTERNAL MEDICINE | Facility: CLINIC | Age: 44
End: 2024-12-29
Payer: MEDICAID

## 2024-12-29 DIAGNOSIS — F41.1 GENERALIZED ANXIETY DISORDER: ICD-10-CM

## 2024-12-30 NOTE — TELEPHONE ENCOUNTER
Clinic RN: Please investigate patient's chart or contact patient if the information cannot be found because METOPROLOL the medication is listed as historical or discontinued. Confirm patient is taking this medication. Document findings and route refill encounter to provider for approval or denial.    Marcia ASTORGA, RN, PHN

## 2024-12-31 NOTE — TELEPHONE ENCOUNTER
Please review and advise on ativan   Routing the metoprolol to Rn team to call to verify medication as it was listed as historical     Thank you     Abida Le RN, BSN  [facility]

## 2024-12-31 NOTE — TELEPHONE ENCOUNTER
Attempt # 1  Called Phone # [phone]      Was Call answered? No     Non-detailed voicemail left on December 31, 2024 10:13 AM to call clinic at: [phone].     On Call Back:     Please relay need to confirm patient is taking Metoprolol since it is patient reported med. Also sent MyChart.      Thank you,  Cristobal, Triage OLIVIA Doyle    10:13 AM 12/31/2024

## 2024-12-31 NOTE — TELEPHONE ENCOUNTER
Patient is returning a call back. Writer relay nurse message to patient and she stated that she still take  Metoprolol

## 2025-01-02 RX ORDER — LORAZEPAM 0.5 MG/1
TABLET ORAL
Qty: 30 TABLET | Refills: 0 | Status: SHIPPED | OUTPATIENT
Start: 2025-01-02

## 2025-01-02 RX ORDER — METOPROLOL SUCCINATE 25 MG/1
25 TABLET, EXTENDED RELEASE ORAL DAILY
Qty: 30 TABLET | Refills: 1 | Status: SHIPPED | OUTPATIENT
Start: 2025-01-02

## 2025-02-10 ENCOUNTER — MYC REFILL (OUTPATIENT)
Dept: INTERNAL MEDICINE | Facility: CLINIC | Age: 45
End: 2025-02-10
Payer: MEDICAID

## 2025-02-10 DIAGNOSIS — F41.1 GENERALIZED ANXIETY DISORDER: ICD-10-CM

## 2025-02-11 RX ORDER — METOPROLOL SUCCINATE 25 MG/1
25 TABLET, EXTENDED RELEASE ORAL DAILY
Qty: 90 TABLET | Refills: 1 | Status: SHIPPED | OUTPATIENT
Start: 2025-02-11

## 2025-02-11 RX ORDER — METOPROLOL SUCCINATE 25 MG/1
25 TABLET, EXTENDED RELEASE ORAL DAILY
Qty: 30 TABLET | Refills: 1 | Status: SHIPPED | OUTPATIENT
Start: 2025-02-11 | End: 2025-02-11

## 2025-02-11 NOTE — TELEPHONE ENCOUNTER
Patient states she requested a 90 day supply in her message, but the pharmacy only received 30 day supply. She asks if this can be changed to 90 day supply, has appointment with Dr. Garcia the end of March.     Mar 28, 2025 7:00 AM  (Arrive by 6:45 AM)  Adult Preventative Visit with Gely Le MD  [facility] ([facility] ) [phone]       Svetlana Martinez RN  [facility]

## 2025-03-14 ENCOUNTER — HOSPITAL ENCOUNTER (OUTPATIENT)
Dept: MAMMOGRAPHY | Facility: CLINIC | Age: 45
Discharge: HOME | End: 2025-03-14
Attending: INTERNAL MEDICINE | Admitting: INTERNAL MEDICINE
Payer: COMMERCIAL

## 2025-03-14 DIAGNOSIS — Z12.31 VISIT FOR SCREENING MAMMOGRAM: ICD-10-CM

## 2025-03-14 PROCEDURE — 77063 BREAST TOMOSYNTHESIS BI: CPT

## 2025-04-29 ENCOUNTER — LAB (OUTPATIENT)
Dept: LAB | Facility: CLINIC | Age: 45
End: 2025-04-29
Payer: COMMERCIAL

## 2025-04-29 DIAGNOSIS — E55.9 VITAMIN D DEFICIENCY: ICD-10-CM

## 2025-04-29 DIAGNOSIS — I10 HTN, GOAL BELOW 140/90: ICD-10-CM

## 2025-04-29 DIAGNOSIS — Z00.00 ROUTINE GENERAL MEDICAL EXAMINATION AT A HEALTH CARE FACILITY: ICD-10-CM

## 2025-04-29 LAB
ALBUMIN SERPL BCG-MCNC: 4.5 G/DL (ref 3.5–5.2)
ALP SERPL-CCNC: 83 U/L (ref 40–150)
ALT SERPL W P-5'-P-CCNC: 17 U/L (ref 0–50)
ANION GAP SERPL CALCULATED.3IONS-SCNC: 9 MMOL/L (ref 7–15)
AST SERPL W P-5'-P-CCNC: 19 U/L (ref 0–45)
BILIRUB SERPL-MCNC: 0.4 MG/DL
BUN SERPL-MCNC: 12.8 MG/DL (ref 6–20)
CALCIUM SERPL-MCNC: 9.7 MG/DL (ref 8.8–10.4)
CHLORIDE SERPL-SCNC: 104 MMOL/L (ref 98–107)
CHOLEST SERPL-MCNC: 125 MG/DL
CREAT SERPL-MCNC: 0.77 MG/DL (ref 0.51–0.95)
CREAT UR-MCNC: 42.3 MG/DL
EGFRCR SERPLBLD CKD-EPI 2021: >90 ML/MIN/1.73M2
ERYTHROCYTE [DISTWIDTH] IN BLOOD BY AUTOMATED COUNT: 12.1 % (ref 10–15)
FASTING STATUS PATIENT QL REPORTED: YES
FASTING STATUS PATIENT QL REPORTED: YES
GLUCOSE SERPL-MCNC: 104 MG/DL (ref 70–99)
HCO3 SERPL-SCNC: 25 MMOL/L (ref 22–29)
HCT VFR BLD AUTO: 40.5 % (ref 35–47)
HDLC SERPL-MCNC: 42 MG/DL
HGB BLD-MCNC: 14.3 G/DL (ref 11.7–15.7)
LDLC SERPL CALC-MCNC: 63 MG/DL
MCH RBC QN AUTO: 32.4 PG (ref 26.5–33)
MCHC RBC AUTO-ENTMCNC: 35.3 G/DL (ref 31.5–36.5)
MCV RBC AUTO: 92 FL (ref 78–100)
MICROALBUMIN UR-MCNC: <12 MG/L
MICROALBUMIN/CREAT UR: NORMAL MG/G{CREAT}
NONHDLC SERPL-MCNC: 83 MG/DL
PLATELET # BLD AUTO: 304 10E3/UL (ref 150–450)
POTASSIUM SERPL-SCNC: 4 MMOL/L (ref 3.4–5.3)
PROT SERPL-MCNC: 7.6 G/DL (ref 6.4–8.3)
RBC # BLD AUTO: 4.42 10E6/UL (ref 3.8–5.2)
SODIUM SERPL-SCNC: 138 MMOL/L (ref 135–145)
TRIGL SERPL-MCNC: 98 MG/DL
TSH SERPL DL<=0.005 MIU/L-ACNC: 0.85 UIU/ML (ref 0.3–4.2)
VIT D+METAB SERPL-MCNC: 18 NG/ML (ref 20–50)
WBC # BLD AUTO: 9.6 10E3/UL (ref 4–11)

## 2025-04-29 PROCEDURE — 36415 COLL VENOUS BLD VENIPUNCTURE: CPT

## 2025-04-29 PROCEDURE — 85027 COMPLETE CBC AUTOMATED: CPT

## 2025-04-29 PROCEDURE — 80061 LIPID PANEL: CPT

## 2025-04-29 PROCEDURE — 84443 ASSAY THYROID STIM HORMONE: CPT

## 2025-04-29 PROCEDURE — 82306 VITAMIN D 25 HYDROXY: CPT

## 2025-04-29 PROCEDURE — 80053 COMPREHEN METABOLIC PANEL: CPT

## 2025-04-29 PROCEDURE — 82570 ASSAY OF URINE CREATININE: CPT

## 2025-04-29 PROCEDURE — 82043 UR ALBUMIN QUANTITATIVE: CPT

## 2025-05-12 ENCOUNTER — MYC REFILL (OUTPATIENT)
Dept: INTERNAL MEDICINE | Facility: CLINIC | Age: 45
End: 2025-05-12
Payer: COMMERCIAL

## 2025-05-12 DIAGNOSIS — F41.1 GENERALIZED ANXIETY DISORDER: ICD-10-CM

## 2025-05-13 RX ORDER — LORAZEPAM 0.5 MG/1
TABLET ORAL
Qty: 30 TABLET | Refills: 0 | Status: SHIPPED | OUTPATIENT
Start: 2025-05-13

## 2025-05-22 ENCOUNTER — PATIENT OUTREACH (OUTPATIENT)
Dept: INTERNAL MEDICINE | Facility: CLINIC | Age: 45
End: 2025-05-22
Payer: COMMERCIAL

## 2025-05-22 DIAGNOSIS — B97.7 HIGH RISK HPV INFECTION: Primary | ICD-10-CM

## 2025-06-18 ENCOUNTER — APPOINTMENT (OUTPATIENT)
Dept: CT IMAGING | Facility: CLINIC | Age: 45
End: 2025-06-18
Attending: EMERGENCY MEDICINE
Payer: COMMERCIAL

## 2025-06-18 ENCOUNTER — HOSPITAL ENCOUNTER (OUTPATIENT)
Facility: CLINIC | Age: 45
Setting detail: OBSERVATION
LOS: 1 days | Discharge: HOME | End: 2025-06-19
Attending: EMERGENCY MEDICINE | Admitting: STUDENT IN AN ORGANIZED HEALTH CARE EDUCATION/TRAINING PROGRAM
Payer: COMMERCIAL

## 2025-06-18 DIAGNOSIS — E87.6 HYPOKALEMIA: ICD-10-CM

## 2025-06-18 DIAGNOSIS — R94.31 QT PROLONGATION: ICD-10-CM

## 2025-06-18 DIAGNOSIS — F10.90 ALCOHOL USE DISORDER: Primary | ICD-10-CM

## 2025-06-18 DIAGNOSIS — E83.42 HYPOMAGNESEMIA: ICD-10-CM

## 2025-06-18 DIAGNOSIS — R11.2 NAUSEA AND VOMITING, UNSPECIFIED VOMITING TYPE: ICD-10-CM

## 2025-06-18 DIAGNOSIS — E88.89 ALCOHOLIC KETOSIS (H): ICD-10-CM

## 2025-06-18 DIAGNOSIS — F10.930 ALCOHOL WITHDRAWAL, UNCOMPLICATED (H): ICD-10-CM

## 2025-06-18 LAB
ALBUMIN SERPL BCG-MCNC: 4.2 G/DL (ref 3.5–5.2)
ALP SERPL-CCNC: 104 U/L (ref 40–150)
ALT SERPL W P-5'-P-CCNC: 33 U/L (ref 0–50)
ANION GAP SERPL CALCULATED.3IONS-SCNC: 22 MMOL/L (ref 7–15)
AST SERPL W P-5'-P-CCNC: 41 U/L (ref 0–45)
ATRIAL RATE - MUSE: 91 BPM
B-OH-BUTYR SERPL-SCNC: 0.19 MMOL/L
BASOPHILS # BLD AUTO: 0 10E3/UL (ref 0–0.2)
BASOPHILS NFR BLD AUTO: 0 %
BILIRUB SERPL-MCNC: 1.9 MG/DL
BUN SERPL-MCNC: 11.7 MG/DL (ref 6–20)
CALCIUM SERPL-MCNC: 9.1 MG/DL (ref 8.8–10.4)
CHLORIDE SERPL-SCNC: 91 MMOL/L (ref 98–107)
CREAT SERPL-MCNC: 0.58 MG/DL (ref 0.51–0.95)
DIASTOLIC BLOOD PRESSURE - MUSE: NORMAL MMHG
EGFRCR SERPLBLD CKD-EPI 2021: >90 ML/MIN/1.73M2
EOSINOPHIL # BLD AUTO: 0 10E3/UL (ref 0–0.7)
EOSINOPHIL NFR BLD AUTO: 0 %
ERYTHROCYTE [DISTWIDTH] IN BLOOD BY AUTOMATED COUNT: 13.2 % (ref 10–15)
ETHANOL SERPL-MCNC: <0.01 G/DL
GLUCOSE SERPL-MCNC: 132 MG/DL (ref 70–99)
HCO3 SERPL-SCNC: 20 MMOL/L (ref 22–29)
HCT VFR BLD AUTO: 36.6 % (ref 35–47)
HGB BLD-MCNC: 13.5 G/DL (ref 11.7–15.7)
IMM GRANULOCYTES # BLD: 0.1 10E3/UL
IMM GRANULOCYTES NFR BLD: 0 %
INTERPRETATION ECG - MUSE: NORMAL
LIPASE SERPL-CCNC: 16 U/L (ref 13–60)
LYMPHOCYTES # BLD AUTO: 2.2 10E3/UL (ref 0.8–5.3)
LYMPHOCYTES NFR BLD AUTO: 16 %
MAGNESIUM SERPL-MCNC: 0.9 MG/DL (ref 1.7–2.3)
MAGNESIUM SERPL-MCNC: 1.7 MG/DL (ref 1.7–2.3)
MCH RBC QN AUTO: 33.2 PG (ref 26.5–33)
MCHC RBC AUTO-ENTMCNC: 36.9 G/DL (ref 31.5–36.5)
MCV RBC AUTO: 90 FL (ref 78–100)
MONOCYTES # BLD AUTO: 0.7 10E3/UL (ref 0–1.3)
MONOCYTES NFR BLD AUTO: 5 %
NEUTROPHILS # BLD AUTO: 10.3 10E3/UL (ref 1.6–8.3)
NEUTROPHILS NFR BLD AUTO: 78 %
NRBC # BLD AUTO: 0 10E3/UL
NRBC BLD AUTO-RTO: 0 /100
P AXIS - MUSE: 51 DEGREES
PHOSPHATE SERPL-MCNC: 1.3 MG/DL (ref 2.5–4.5)
PHOSPHATE SERPL-MCNC: 2.3 MG/DL (ref 2.5–4.5)
PLATELET # BLD AUTO: 239 10E3/UL (ref 150–450)
POTASSIUM SERPL-SCNC: 3.2 MMOL/L (ref 3.4–5.3)
PR INTERVAL - MUSE: 120 MS
PROT SERPL-MCNC: 7.1 G/DL (ref 6.4–8.3)
QRS DURATION - MUSE: 88 MS
QT - MUSE: 408 MS
QTC - MUSE: 501 MS
R AXIS - MUSE: 65 DEGREES
RBC # BLD AUTO: 4.07 10E6/UL (ref 3.8–5.2)
SODIUM SERPL-SCNC: 133 MMOL/L (ref 135–145)
SYSTOLIC BLOOD PRESSURE - MUSE: NORMAL MMHG
T AXIS - MUSE: 53 DEGREES
VENTRICULAR RATE- MUSE: 91 BPM
WBC # BLD AUTO: 13.2 10E3/UL (ref 4–11)

## 2025-06-18 PROCEDURE — 250N000009 HC RX 250: Performed by: STUDENT IN AN ORGANIZED HEALTH CARE EDUCATION/TRAINING PROGRAM

## 2025-06-18 PROCEDURE — 96361 HYDRATE IV INFUSION ADD-ON: CPT

## 2025-06-18 PROCEDURE — 84100 ASSAY OF PHOSPHORUS: CPT | Performed by: PHYSICIAN ASSISTANT

## 2025-06-18 PROCEDURE — 258N000003 HC RX IP 258 OP 636: Performed by: EMERGENCY MEDICINE

## 2025-06-18 PROCEDURE — 82010 KETONE BODYS QUAN: CPT | Performed by: PHYSICIAN ASSISTANT

## 2025-06-18 PROCEDURE — 250N000011 HC RX IP 250 OP 636: Performed by: EMERGENCY MEDICINE

## 2025-06-18 PROCEDURE — 93005 ELECTROCARDIOGRAM TRACING: CPT

## 2025-06-18 PROCEDURE — 96365 THER/PROPH/DIAG IV INF INIT: CPT

## 2025-06-18 PROCEDURE — 99285 EMERGENCY DEPT VISIT HI MDM: CPT | Mod: 25

## 2025-06-18 PROCEDURE — 36415 COLL VENOUS BLD VENIPUNCTURE: CPT | Performed by: EMERGENCY MEDICINE

## 2025-06-18 PROCEDURE — 83690 ASSAY OF LIPASE: CPT | Performed by: EMERGENCY MEDICINE

## 2025-06-18 PROCEDURE — 258N000001 HC RX 258: Performed by: PHYSICIAN ASSISTANT

## 2025-06-18 PROCEDURE — 250N000009 HC RX 250: Performed by: EMERGENCY MEDICINE

## 2025-06-18 PROCEDURE — 120N000001 HC R&B MED SURG/OB

## 2025-06-18 PROCEDURE — 84100 ASSAY OF PHOSPHORUS: CPT | Performed by: STUDENT IN AN ORGANIZED HEALTH CARE EDUCATION/TRAINING PROGRAM

## 2025-06-18 PROCEDURE — 70491 CT SOFT TISSUE NECK W/DYE: CPT

## 2025-06-18 PROCEDURE — 250N000013 HC RX MED GY IP 250 OP 250 PS 637: Performed by: PHYSICIAN ASSISTANT

## 2025-06-18 PROCEDURE — 84155 ASSAY OF PROTEIN SERUM: CPT | Performed by: EMERGENCY MEDICINE

## 2025-06-18 PROCEDURE — 96375 TX/PRO/DX INJ NEW DRUG ADDON: CPT | Mod: 59

## 2025-06-18 PROCEDURE — 82077 ASSAY SPEC XCP UR&BREATH IA: CPT | Performed by: EMERGENCY MEDICINE

## 2025-06-18 PROCEDURE — 96376 TX/PRO/DX INJ SAME DRUG ADON: CPT

## 2025-06-18 PROCEDURE — 85025 COMPLETE CBC W/AUTO DIFF WBC: CPT | Performed by: EMERGENCY MEDICINE

## 2025-06-18 PROCEDURE — 258N000003 HC RX IP 258 OP 636: Performed by: STUDENT IN AN ORGANIZED HEALTH CARE EDUCATION/TRAINING PROGRAM

## 2025-06-18 PROCEDURE — 83735 ASSAY OF MAGNESIUM: CPT | Performed by: STUDENT IN AN ORGANIZED HEALTH CARE EDUCATION/TRAINING PROGRAM

## 2025-06-18 PROCEDURE — 250N000013 HC RX MED GY IP 250 OP 250 PS 637: Performed by: EMERGENCY MEDICINE

## 2025-06-18 PROCEDURE — 83735 ASSAY OF MAGNESIUM: CPT | Performed by: EMERGENCY MEDICINE

## 2025-06-18 PROCEDURE — 36415 COLL VENOUS BLD VENIPUNCTURE: CPT | Performed by: STUDENT IN AN ORGANIZED HEALTH CARE EDUCATION/TRAINING PROGRAM

## 2025-06-18 PROCEDURE — 99223 1ST HOSP IP/OBS HIGH 75: CPT | Performed by: PHYSICIAN ASSISTANT

## 2025-06-18 RX ORDER — MULTIPLE VITAMINS W/ MINERALS TAB 9MG-400MCG
1 TAB ORAL DAILY
Status: DISCONTINUED | OUTPATIENT
Start: 2025-06-18 | End: 2025-06-19 | Stop reason: HOSPADM

## 2025-06-18 RX ORDER — GABAPENTIN 300 MG/1
300 CAPSULE ORAL EVERY 8 HOURS
Status: DISCONTINUED | OUTPATIENT
Start: 2025-06-23 | End: 2025-06-19 | Stop reason: HOSPADM

## 2025-06-18 RX ORDER — ACETAMINOPHEN 650 MG/1
650 SUPPOSITORY RECTAL EVERY 4 HOURS PRN
Status: DISCONTINUED | OUTPATIENT
Start: 2025-06-18 | End: 2025-06-19 | Stop reason: HOSPADM

## 2025-06-18 RX ORDER — DIAZEPAM 10 MG/2ML
5 INJECTION, SOLUTION INTRAMUSCULAR; INTRAVENOUS ONCE
Status: COMPLETED | OUTPATIENT
Start: 2025-06-18 | End: 2025-06-18

## 2025-06-18 RX ORDER — HALOPERIDOL 5 MG/ML
2.5-5 INJECTION INTRAMUSCULAR EVERY 6 HOURS PRN
Status: DISCONTINUED | OUTPATIENT
Start: 2025-06-18 | End: 2025-06-19 | Stop reason: HOSPADM

## 2025-06-18 RX ORDER — GABAPENTIN 300 MG/1
600 CAPSULE ORAL EVERY 8 HOURS
Status: DISCONTINUED | OUTPATIENT
Start: 2025-06-21 | End: 2025-06-19 | Stop reason: HOSPADM

## 2025-06-18 RX ORDER — IOPAMIDOL 755 MG/ML
500 INJECTION, SOLUTION INTRAVASCULAR ONCE
Status: COMPLETED | OUTPATIENT
Start: 2025-06-18 | End: 2025-06-18

## 2025-06-18 RX ORDER — LISINOPRIL 20 MG/1
20 TABLET ORAL DAILY
Status: DISCONTINUED | OUTPATIENT
Start: 2025-06-19 | End: 2025-06-19 | Stop reason: HOSPADM

## 2025-06-18 RX ORDER — METOPROLOL SUCCINATE 25 MG/1
25 TABLET, EXTENDED RELEASE ORAL DAILY
Status: DISCONTINUED | OUTPATIENT
Start: 2025-06-18 | End: 2025-06-19 | Stop reason: HOSPADM

## 2025-06-18 RX ORDER — CLONIDINE HYDROCHLORIDE 0.1 MG/1
0.1 TABLET ORAL EVERY 8 HOURS PRN
Status: DISCONTINUED | OUTPATIENT
Start: 2025-06-18 | End: 2025-06-19 | Stop reason: HOSPADM

## 2025-06-18 RX ORDER — GABAPENTIN 600 MG/1
1200 TABLET ORAL ONCE
Status: COMPLETED | OUTPATIENT
Start: 2025-06-18 | End: 2025-06-18

## 2025-06-18 RX ORDER — ACETAMINOPHEN 325 MG/1
650 TABLET ORAL EVERY 4 HOURS PRN
Status: DISCONTINUED | OUTPATIENT
Start: 2025-06-18 | End: 2025-06-19 | Stop reason: HOSPADM

## 2025-06-18 RX ORDER — FOLIC ACID 1 MG/1
1 TABLET ORAL DAILY
Status: DISCONTINUED | OUTPATIENT
Start: 2025-06-19 | End: 2025-06-18

## 2025-06-18 RX ORDER — PROCHLORPERAZINE MALEATE 10 MG
10 TABLET ORAL EVERY 6 HOURS PRN
Status: DISCONTINUED | OUTPATIENT
Start: 2025-06-18 | End: 2025-06-19 | Stop reason: HOSPADM

## 2025-06-18 RX ORDER — MAGNESIUM SULFATE HEPTAHYDRATE 40 MG/ML
2 INJECTION, SOLUTION INTRAVENOUS ONCE
Status: COMPLETED | OUTPATIENT
Start: 2025-06-18 | End: 2025-06-18

## 2025-06-18 RX ORDER — GABAPENTIN 300 MG/1
900 CAPSULE ORAL EVERY 8 HOURS
Status: DISCONTINUED | OUTPATIENT
Start: 2025-06-18 | End: 2025-06-19 | Stop reason: HOSPADM

## 2025-06-18 RX ORDER — AMOXICILLIN 250 MG
1 CAPSULE ORAL 2 TIMES DAILY PRN
Status: DISCONTINUED | OUTPATIENT
Start: 2025-06-18 | End: 2025-06-19 | Stop reason: HOSPADM

## 2025-06-18 RX ORDER — DIAZEPAM 10 MG/2ML
5-10 INJECTION, SOLUTION INTRAMUSCULAR; INTRAVENOUS EVERY 30 MIN PRN
Status: DISCONTINUED | OUTPATIENT
Start: 2025-06-18 | End: 2025-06-18

## 2025-06-18 RX ORDER — DIAZEPAM 5 MG/1
10 TABLET ORAL EVERY 30 MIN PRN
Status: DISCONTINUED | OUTPATIENT
Start: 2025-06-18 | End: 2025-06-19 | Stop reason: HOSPADM

## 2025-06-18 RX ORDER — GABAPENTIN 600 MG/1
1200 TABLET ORAL ONCE
Status: DISCONTINUED | OUTPATIENT
Start: 2025-06-18 | End: 2025-06-18

## 2025-06-18 RX ORDER — FLUMAZENIL 0.1 MG/ML
0.2 INJECTION, SOLUTION INTRAVENOUS
Status: DISCONTINUED | OUTPATIENT
Start: 2025-06-18 | End: 2025-06-19 | Stop reason: HOSPADM

## 2025-06-18 RX ORDER — DEXTROSE MONOHYDRATE, SODIUM CHLORIDE, AND POTASSIUM CHLORIDE 50; 1.49; 9 G/1000ML; G/1000ML; G/1000ML
INJECTION, SOLUTION INTRAVENOUS CONTINUOUS
Status: DISPENSED | OUTPATIENT
Start: 2025-06-18 | End: 2025-06-19

## 2025-06-18 RX ORDER — CALCIUM CARBONATE 500 MG/1
500 TABLET, CHEWABLE ORAL DAILY PRN
Status: DISCONTINUED | OUTPATIENT
Start: 2025-06-18 | End: 2025-06-18

## 2025-06-18 RX ORDER — GABAPENTIN 100 MG/1
100 CAPSULE ORAL EVERY 8 HOURS
Status: DISCONTINUED | OUTPATIENT
Start: 2025-06-25 | End: 2025-06-19 | Stop reason: HOSPADM

## 2025-06-18 RX ORDER — MULTIPLE VITAMINS W/ MINERALS TAB 9MG-400MCG
1 TAB ORAL DAILY
Status: DISCONTINUED | OUTPATIENT
Start: 2025-06-19 | End: 2025-06-18

## 2025-06-18 RX ORDER — DIAZEPAM 10 MG/2ML
5-10 INJECTION, SOLUTION INTRAMUSCULAR; INTRAVENOUS EVERY 30 MIN PRN
Status: DISCONTINUED | OUTPATIENT
Start: 2025-06-18 | End: 2025-06-19 | Stop reason: HOSPADM

## 2025-06-18 RX ORDER — CALCIUM CARBONATE 500 MG/1
1000 TABLET, CHEWABLE ORAL 4 TIMES DAILY PRN
Status: DISCONTINUED | OUTPATIENT
Start: 2025-06-18 | End: 2025-06-19 | Stop reason: HOSPADM

## 2025-06-18 RX ORDER — OLANZAPINE 5 MG/1
5-10 TABLET, ORALLY DISINTEGRATING ORAL EVERY 6 HOURS PRN
Status: DISCONTINUED | OUTPATIENT
Start: 2025-06-18 | End: 2025-06-19 | Stop reason: HOSPADM

## 2025-06-18 RX ORDER — DIAZEPAM 5 MG/1
10 TABLET ORAL EVERY 30 MIN PRN
Status: DISCONTINUED | OUTPATIENT
Start: 2025-06-18 | End: 2025-06-18

## 2025-06-18 RX ORDER — POTASSIUM CHLORIDE 1.5 G/1.58G
40 POWDER, FOR SOLUTION ORAL ONCE
Status: COMPLETED | OUTPATIENT
Start: 2025-06-18 | End: 2025-06-18

## 2025-06-18 RX ORDER — LIDOCAINE 40 MG/G
CREAM TOPICAL
Status: DISCONTINUED | OUTPATIENT
Start: 2025-06-18 | End: 2025-06-19 | Stop reason: HOSPADM

## 2025-06-18 RX ORDER — AMOXICILLIN 250 MG
2 CAPSULE ORAL 2 TIMES DAILY PRN
Status: DISCONTINUED | OUTPATIENT
Start: 2025-06-18 | End: 2025-06-19 | Stop reason: HOSPADM

## 2025-06-18 RX ORDER — FOLIC ACID 1 MG/1
1 TABLET ORAL DAILY
Status: DISCONTINUED | OUTPATIENT
Start: 2025-06-18 | End: 2025-06-19 | Stop reason: HOSPADM

## 2025-06-18 RX ADMIN — POTASSIUM CHLORIDE 40 MEQ: 1.5 POWDER, FOR SOLUTION ORAL at 13:54

## 2025-06-18 RX ADMIN — IOPAMIDOL 90 ML: 755 INJECTION, SOLUTION INTRAVENOUS at 10:12

## 2025-06-18 RX ADMIN — METOPROLOL SUCCINATE 25 MG: 25 TABLET, EXTENDED RELEASE ORAL at 16:45

## 2025-06-18 RX ADMIN — DIAZEPAM 10 MG: 5 TABLET ORAL at 16:44

## 2025-06-18 RX ADMIN — SODIUM PHOSPHATE, MONOBASIC, MONOHYDRATE AND SODIUM PHOSPHATE, DIBASIC, ANHYDROUS 15 MMOL: 142; 276 INJECTION, SOLUTION INTRAVENOUS at 22:23

## 2025-06-18 RX ADMIN — GABAPENTIN 1200 MG: 600 TABLET, FILM COATED ORAL at 16:43

## 2025-06-18 RX ADMIN — SODIUM CHLORIDE 65 ML: 9 INJECTION, SOLUTION INTRAVENOUS at 10:12

## 2025-06-18 RX ADMIN — SODIUM CHLORIDE 1000 ML: 0.9 INJECTION, SOLUTION INTRAVENOUS at 09:41

## 2025-06-18 RX ADMIN — DIAZEPAM 5 MG: 5 INJECTION INTRAMUSCULAR; INTRAVENOUS at 14:22

## 2025-06-18 RX ADMIN — DIAZEPAM 5 MG: 5 INJECTION INTRAMUSCULAR; INTRAVENOUS at 11:55

## 2025-06-18 RX ADMIN — MAGNESIUM SULFATE HEPTAHYDRATE 2 G: 40 INJECTION, SOLUTION INTRAVENOUS at 10:33

## 2025-06-18 RX ADMIN — Medication 1 TABLET: at 16:43

## 2025-06-18 RX ADMIN — DEXTROSE, SODIUM CHLORIDE, AND POTASSIUM CHLORIDE: 5; .9; .15 INJECTION INTRAVENOUS at 16:53

## 2025-06-18 RX ADMIN — FOLIC ACID 1 MG: 1 TABLET ORAL at 16:43

## 2025-06-18 RX ADMIN — DIAZEPAM 5 MG: 5 INJECTION INTRAMUSCULAR; INTRAVENOUS at 09:49

## 2025-06-18 RX ADMIN — THIAMINE HCL TAB 100 MG 100 MG: 100 TAB at 16:44

## 2025-06-18 RX ADMIN — PROCHLORPERAZINE EDISYLATE 10 MG: 5 INJECTION INTRAMUSCULAR; INTRAVENOUS at 11:53

## 2025-06-18 RX ADMIN — DIAZEPAM 10 MG: 5 TABLET ORAL at 23:02

## 2025-06-18 ASSESSMENT — ACTIVITIES OF DAILY LIVING (ADL)
ADLS_ACUITY_SCORE: 42
ADLS_ACUITY_SCORE: 16
ADLS_ACUITY_SCORE: 42
ADLS_ACUITY_SCORE: 16
ADLS_ACUITY_SCORE: 42

## 2025-06-18 ASSESSMENT — LIFESTYLE VARIABLES
TREMOR: 2
AGITATION: NORMAL ACTIVITY
TOTAL SCORE: 11
ORIENTATION AND CLOUDING OF SENSORIUM: ORIENTED AND CAN DO SERIAL ADDITIONS
TOTAL SCORE: 10
AGITATION: NORMAL ACTIVITY
AUDITORY DISTURBANCES: NOT PRESENT
HEADACHE, FULLNESS IN HEAD: NOT PRESENT
TREMOR: 2
TREMOR: NO TREMOR
VISUAL DISTURBANCES: VERY MILD SENSITIVITY
ANXIETY: 5
AUDITORY DISTURBANCES: NOT PRESENT
NAUSEA AND VOMITING: 5
PAROXYSMAL SWEATS: NO SWEAT VISIBLE
VISUAL DISTURBANCES: NOT PRESENT
NAUSEA AND VOMITING: 3
ANXIETY: MILDLY ANXIOUS
PAROXYSMAL SWEATS: BARELY PERCEPTIBLE SWEATING, PALMS MOIST
NAUSEA AND VOMITING: CONSTANT NAUSEA, FREQUENT DRY HEAVES AND VOMITING
PAROXYSMAL SWEATS: BARELY PERCEPTIBLE SWEATING, PALMS MOIST
ORIENTATION AND CLOUDING OF SENSORIUM: ORIENTED AND CAN DO SERIAL ADDITIONS
VISUAL DISTURBANCES: NOT PRESENT
AGITATION: SOMEWHAT MORE THAN NORMAL ACTIVITY
HEADACHE, FULLNESS IN HEAD: NOT PRESENT
AUDITORY DISTURBANCES: NOT PRESENT
ANXIETY: 3
ORIENTATION AND CLOUDING OF SENSORIUM: ORIENTED AND CAN DO SERIAL ADDITIONS

## 2025-06-18 ASSESSMENT — COLUMBIA-SUICIDE SEVERITY RATING SCALE - C-SSRS
2. HAVE YOU ACTUALLY HAD ANY THOUGHTS OF KILLING YOURSELF IN THE PAST MONTH?: NO
1. IN THE PAST MONTH, HAVE YOU WISHED YOU WERE DEAD OR WISHED YOU COULD GO TO SLEEP AND NOT WAKE UP?: NO
6. HAVE YOU EVER DONE ANYTHING, STARTED TO DO ANYTHING, OR PREPARED TO DO ANYTHING TO END YOUR LIFE?: NO

## 2025-06-18 NOTE — H&P
[facility]    History and Physical - Hospitalist Service       Date of Admission:  6/18/2025    Assessment & Plan Tati Hamilton is a 44 year old female with Pmhx of alcohol use disorder, HTN, ESTEPHANIA, MDD, MS, who was admitted on 6/18/2025 with acute alcohol withdrawal.     In the ED hypertensive, tachycardic.  CMP with mild hyponatremia, hypokalemia, mildly elevated total bilirubin.  Anion gap is elevated to 22, glucose is 132.  No lactic acid or ketones checked.  CBC with neutrophilic leukocytosis.  Blood alcohol level negative.  EKG normal sinus rhythm, prolonged QTc of 501 ms  CT scan soft tissue of neck with contrast negative for anything acute.  Given multiple rounds of antiemetics, IV fluids, electrolyte replacement, Valium.  Remains symptomatic with current CIWA around 10.  Admission was requested from hospitalist for further cares and monitoring, treatment of alcohol withdrawal.    Acute Alcohol Withdrawal  Presented with intractable nausea, vomiting, tremor and mood symptoms. CIWA still elevated 11 and unable to tolerate PO in ED. Last drink evening of 6/17. Binge alcohol use described for 72 hours prior. Last admission for alcohol withdrawal in September 2024. No history of alcohol withdrawal. Patient is interested in sobriety. Reports history of alcohol hepatitis previously improved.   - Admit inpatient  - CIWA protocol with valium  - gabapentin loading protocol  - clonidine PRN for elevated BP  - electrolyte replacement  - telemetry monitoring  - ADAT  - Anti emetics PRN - re check QTc interval prior to ordering Zofran (EKG pending)  - Declines chemical dependency consultation at this time. Plans to follow up with AA after discharge     AGMA  Electrolyte deficiencies: Hypokalemia, hypomagnesemia, hyponatremia  Due to binge alcohol use, GI losses, poor oral intake. Suspect alcohol ketoacidosis.  -Given potassium in the ED.  Starting IV fluids containing potassium.  Recheck in the  "morning, no potassium protocol currently ordered.  -check ketones  -give iv fluids with dextrose, additional liter.   -reassess in AM further IV fluid needs     Right sided Jaw pain  Subacute. No signs of infection or acute problems. The patient also has been experiencing constant right sided jaw pain for the past three weeks. She was evaluated by a dentist two days ago (6/16/25), who noted it was not a dental issue following imaging.  -apap PRN  -follow up with PCP     Leukocytosis   Suspect stress de margination due to withdrawal. No signs of infection or fever.  -further work up if febrile     Prolonged QTC  501 ms on admit EKG.   -recheck EKG  -telemetry   -avoid qtc prolonging medications       Multiple Sclerosis  Diagnosed in 2001, maintained on medications.  Does not follow with neurology.  Patient reports no symptoms from MS.    HTN  -resume PTA lisinopril, metoprolol     ESTEPHANIA  -hold PTA ativan while receiving valium  -follow up with PCP vs Psychiatry regarding medication management. Stopped fluoxetine over 1 week ago.          Diet:  ADAT    DVT Prophylaxis: Pneumatic Compression Devices  Joe Catheter: Not present    Cardiac Monitoring: Yes     Code Status:  Full Code     Clinically Significant Risk Factors Present on Admission        # Hypokalemia: Lowest K = 3.2 mmol/L in last 2 days, will replace as needed  # Hyponatremia: Lowest Na = 133 mmol/L in last 2 days, will monitor as appropriate  # Hypochloremia: Lowest Cl = 91 mmol/L in last 2 days, will monitor as appropriate    # Hypomagnesemia: Lowest Mg = 0.9 mg/dL in last 2 days, will replace as needed  # Anion Gap Metabolic Acidosis: Highest Anion Gap = 22 mmol/L in last 2 days, will monitor and treat as appropriate      # Hypertension: Noted on problem list           # Overweight: Estimated body mass index is 25.38 kg/m  as calculated from the following:    Height as of this encounter: 1.702 m (5' 7\").    Weight as of this encounter: 73.5 kg (162 lb " "0.6 oz).       # Financial/Environmental Concerns:           Disposition Plan               Hermila Ortiz PA-C    ______________________________________________________________________    Chief Complaint   \"Alcohol problem\"     History is obtained from the patient    History of Present Illness   Tati Hamilton is a 44 year old female who presented to the ED for evaluation of \"alcohol withdrawal and jaw pain \".   The patient reported that she had been drinking large amounts of alcohol 6/15-6/17.  Reports drinking at least 10\" shooters\" daily described as vodka, hard alcohol drinks.    She had been sober for 120 days prior to this.  History of alcohol withdrawal, last admitted for this in 2024.  She endorses symptoms of nausea, persistent vomiting and inability to keep down fluids which is why she came to the ED this morning around 4 AM.  She reports that she was triggered to start drinking again due to stress.  She stopped taking her fluoxetine 1 week prior.    Denies any fevers, abdominal pain.    Feeling improved after medications in the ED but still unable to tolerate oral intake after crackers and ice throwing up.    Past Medical History    Past Medical History:   Diagnosis Date    Anxiety     Calculus of kidney     Depressive disorder     Depressive disorder, not elsewhere classified     ESTEPHANIA (generalised anxiety disorder)     High risk HPV infection 3/20/15,10/17/16    Not HPV 16 or 18, HR, Not 16 or 18    History of colposcopy 08/05/2020    HTN, goal below 140/90     Lung nodules 05/2010    Needs f/u CT Nov 2010    Multiple sclerosis (H) Dx in 2001    Multiple sclerosis (H)     PONV (postoperative nausea and vomiting)     Vitamin D deficiencies        Past Surgical History   Past Surgical History:   Procedure Laterality Date    HC COLP CERVIX/UPPER VAGINA W LOOP ELEC BX CERVIX      HC REMOVAL OF TONSILS,<13 Y/O      Tonsils <12y.o.    LAPAROSCOPIC CHOLECYSTECTOMY  5/8/2014    Procedure: LAPAROSCOPIC " CHOLECYSTECTOMY;  Surgeon: Sona Giraldo MD;  Location:  OR    [facility] NONSPECIFIC PROCEDURE      2 procedures for kidney stones       Prior to Admission Medications   Prior to Admission Medications   Prescriptions Last Dose Informant Patient Reported? Taking?   LORazepam (ATIVAN) 0.5 MG tablet Past Week  No Yes   Sig: TAKE ONE TABLET BY MOUTH ONCE DAILY AS NEEDED FOR ANXIETY   lisinopril (ZESTRIL) 20 MG tablet 6/17/2025 Morning  No Yes   Sig: Take 1 tablet (20 mg) by mouth daily. May take additional 20 mg if the blood pressure remains above 135/90   metoprolol succinate ER (TOPROL XL) 25 MG 24 hr tablet 6/17/2025 Morning  No Yes   Sig: Take 1 tablet (25 mg) by mouth daily.   multivitamin w/minerals (THERA-VIT-M) tablet Past Month  No Yes   Sig: Take 1 tablet by mouth daily.   vitamin B complex with vitamin C (VITAMIN  B COMPLEX) tablet Past Week Self Yes Yes   Sig: Take 1 tablet by mouth daily.      Facility-Administered Medications: None        Review of Systems    The 10 point Review of Systems is negative other than noted in the HPI or here.     Social History   I have reviewed this patient's social history and updated it with pertinent information if needed.  Social History     Tobacco Use    Smoking status: Every Day     Current packs/day: 0.50     Average packs/day: 0.5 packs/day for 4.0 years (2.0 ttl pk-yrs)     Types: Cigarettes     Passive exposure: Current    Smokeless tobacco: Never    Tobacco comments:     socially   Vaping Use    Vaping status: Never Used   Substance Use Topics    Alcohol use: Yes     Alcohol/week: 0.0 standard drinks of alcohol     Comment: occasional    Drug use: No        Physical Exam   Vital Signs: Temp: 97.7  F (36.5  C) Temp src: Oral BP: (!) 143/86 Pulse: 104   Resp: 20 SpO2: 98 % O2 Device: None (Room air)    Weight: 162 lbs .61 oz    Constitutional: Awake, alert,  no apparent distress.  Eyes: Conjunctiva and pupils examined and normal.  HEENT: Moist mucous membranes,  normal dentition.  Respiratory: Clear to auscultation bilaterally, no crackles or wheezing.  Cardiovascular: tachycardic rate, regular rhythm, normal S1 and S2, and no murmur noted.  GI: Soft, non-distended, non-tender, bowel sounds present. No rebound tenderness or guarding.  Lymph/Hematologic: No anterior cervical or supraclavicular adenopathy.  Skin: No rashes, no cyanosis, no edema.  Musculoskeletal: No deformities noted.  No erythema or tenderness. Moving all extremities.  Neurologic: No focal deficits noted. Speech is clear. Coordination and strength grossly normal.   Psychiatric: Appropriate affect.       Medical Decision Making       75 MINUTES SPENT BY ME on the date of service doing chart review, history, exam, documentation & further activities per the note.      Data   ------------------------- PAST 24 HR DATA REVIEWED -----------------------------------------------

## 2025-06-18 NOTE — ED TRIAGE NOTES
Patient presents to ER with mother reporting here for alcohol withdrawal.  Last drink yesterday at 1900.  Patient reports drinks 10 shooters daily since Sunday.  CIWA score in triage 11.  Has been in alcohol withdrawal before and not history of seizures.  Pt does not want to go to detox or treatment for alcohol withdrawal.     Triage Assessment (Adult)       Row Name 06/18/25 0844          Triage Assessment    Airway WDL WDL        Respiratory WDL    Respiratory WDL WDL        Skin Circulation/Temperature WDL    Skin Circulation/Temperature WDL WDL        Cardiac WDL    Cardiac WDL WDL

## 2025-06-18 NOTE — PLAN OF CARE
"Goal Outcome Evaluation:      Plan of Care Reviewed With: patient    Overall Patient Progress: no changeOverall Patient Progress: no change    Outcome Evaluation: pt transferred from ED. VSS. on RA. started IV dextrose 5% & NaCl + KCL 20 mEQ/L  100ml/hrs infusion.c/o anxiety, N/V. had X1 emesisi.    Pt is A&OX4, on RA.on Tele . Cont CIWA  monitoring, scored -11 and than -7. LS CTA, denies SOB. Pt had X1 N/V. Abdomen round, soft.BS activeX4. Last BM this evening, loose stool. Voiding. Pt denies N/T. No pain. On Mg++ and phos protocol. Rechecked labs. Phos. result  1.3. IV infusing.next recheck @ AM. SBA.      Problem: Adult Inpatient Plan of Care  Goal: Plan of Care Review  Description: The Plan of Care Review/Shift note should be completed every shift.  The Outcome Evaluation is a brief statement about your assessment that the patient is improving, declining, or no change.  This information will be displayed automatically on your shift  note.  Outcome: Progressing  Flowsheets (Taken 6/18/2025 1814)  Outcome Evaluation: pt transferred from ED. VSS. on RA. started IV dextrose 5% & NaCl + KCL 20 mEQ/L  100ml/hrs infusion.c/o anxiety, N/V. had X1 emesisi.  Plan of Care Reviewed With: patient  Overall Patient Progress: no change  Goal: Patient-Specific Goal (Individualized)  Description: You can add care plan individualizations to a care plan. Examples of Individualization might be:  \"Parent requests to be called daily at 9am for status\", \"I have a hard time hearing out of my right ear\", or \"Do not touch me to wake me up as it startles  me\".  Outcome: Progressing  Goal: Absence of Hospital-Acquired Illness or Injury  Outcome: Progressing  Intervention: Identify and Manage Fall Risk  Recent Flowsheet Documentation  Taken 6/18/2025 1600 by Karol Linda RN  Safety Promotion/Fall Prevention: (FALLPREVENTION) --  Intervention: Prevent Infection  Recent Flowsheet Documentation  Taken 6/18/2025 1600 by Karol Linda, " RN  Infection Prevention: hand hygiene promoted  Goal: Optimal Comfort and Wellbeing  Outcome: Progressing  Goal: Readiness for Transition of Care  Outcome: Progressing     Problem: Alcohol Withdrawal  Goal: Alcohol Withdrawal Symptom Control  Outcome: Progressing  Goal: Optimal Neurologic Function  Outcome: Progressing  Goal: Readiness for Change Identified  Outcome: Progressing     Problem: Hypertension Acute  Goal: Blood Pressure Within Desired Range  Outcome: Progressing

## 2025-06-18 NOTE — PHARMACY-ADMISSION MEDICATION HISTORY
"Pharmacist Admission Medication History    Admission medication history is complete. The information provided in this note is only as accurate as the sources available at the time of the update.    Information Source(s): Patient and CareEverywhere/SureScripts via in-person    Pertinent Information: Stopped taking fluoxetine about 1-2 weeks ago as it was making her \"feel flat.\"    Changes made to PTA medication list:  Added: None  Deleted: Buspirone, Famotidine, Fluoxetine, Hydroxyzine, Ondansetron, Thiamine  Changed: None    Allergies reviewed with patient and updates made in EHR: no    Medication History Completed By: Wiliam Beck RPH 6/18/2025 1:48 PM    PTA Med List   Medication Sig Last Dose/Taking    lisinopril (ZESTRIL) 20 MG tablet Take 1 tablet (20 mg) by mouth daily. May take additional 20 mg if the blood pressure remains above 135/90 6/17/2025 Morning    LORazepam (ATIVAN) 0.5 MG tablet TAKE ONE TABLET BY MOUTH ONCE DAILY AS NEEDED FOR ANXIETY Past Week    metoprolol succinate ER (TOPROL XL) 25 MG 24 hr tablet Take 1 tablet (25 mg) by mouth daily. 6/17/2025 Morning    multivitamin w/minerals (THERA-VIT-M) tablet Take 1 tablet by mouth daily. Past Month    vitamin B complex with vitamin C (VITAMIN  B COMPLEX) tablet Take 1 tablet by mouth daily. Past Week     "

## 2025-06-18 NOTE — ED NOTES
[facility]  ED Nurse Handoff Report    ED Chief complaint: Alcohol Problem  . ED Diagnosis:   Final diagnoses:   Alcohol withdrawal, uncomplicated (H)   Hypomagnesemia   QT prolongation   Nausea and vomiting, unspecified vomiting type   Alcoholic ketosis (H)   Hypokalemia       Allergies:   Allergies   Allergen Reactions    No Known Drug Allergy        Code Status: Full Code    Activity level - Baseline/Home:  independent.  Activity Level - Current:   independent.   Lift room needed: No.   Bariatric: No   Needed: No   Isolation: No.   Infection: Not Applicable.     Respiratory status: Room air    Vital Signs (within 30 minutes):   Vitals:    06/18/25 1248 06/18/25 1303 06/18/25 1318 06/18/25 1333   BP: (!) 152/104 (!) 155/107 (!) 170/109 (!) 143/86   Pulse: 97 98 116 104   Resp:       Temp:       TempSrc:       SpO2: 96% 95% 97% 98%   Weight:       Height:           Cardiac Rhythm:  ,    NSR  Pain level:  Denies  Patient confused: No.   Patient Falls Risk: nonskid shoes/slippers when out of bed and patient and family education.   Elimination Status: Has voided     Patient Report - Initial Complaint: Patient presents to ER with mother reporting here for alcohol withdrawal. Last drink yesterday at 1900. Patient reports drinks 10 shooters daily since Sunday. CIWA score in triage 11. Has been in alcohol withdrawal before and not history of seizures. Pt does not want to go to detox or treatment for alcohol withdrawal. .   Focused Assessment: HPI   Tati Hamilton is a 44 year old female with a history of MS and hypertension presenting with with alcohol withdrawal and jaw pain. The patient last consumed alcohol last night at 1900. She endorses persistent vomiting since. She took Tylenol this morning, but vomited shortly after. She has a history of withdrawal with no history of withdrawal seizures. Sharee has been admitted for alcohol withdrawal in the past. The patient also has been  experiencing constant right sided jaw pain for the past three weeks. She was evaluated by a dentist two days ago (6/16/25), who noted it was not a dental issue following imaging. Her severity of pain is unchanged throughout the day. No recent falls or trauma. No cough or rhinorrhea.      Abnormal Results:   Labs Ordered and Resulted from Time of ED Arrival to Time of ED Departure   COMPREHENSIVE METABOLIC PANEL - Abnormal       Result Value    Sodium 133 (*)     Potassium 3.2 (*)     Carbon Dioxide (CO2) 20 (*)     Anion Gap 22 (*)     Urea Nitrogen 11.7      Creatinine 0.58      GFR Estimate >90      Calcium 9.1      Chloride 91 (*)     Glucose 132 (*)     Alkaline Phosphatase 104      AST 41      ALT 33      Protein Total 7.1      Albumin 4.2      Bilirubin Total 1.9 (*)    MAGNESIUM - Abnormal    Magnesium 0.9 (*)    CBC WITH PLATELETS AND DIFFERENTIAL - Abnormal    WBC Count 13.2 (*)     RBC Count 4.07      Hemoglobin 13.5      Hematocrit 36.6      MCV 90      MCH 33.2 (*)     MCHC 36.9 (*)     RDW 13.2      Platelet Count 239      % Neutrophils 78      % Lymphocytes 16      % Monocytes 5      % Eosinophils 0      % Basophils 0      % Immature Granulocytes 0      NRBCs per 100 WBC 0      Absolute Neutrophils 10.3 (*)     Absolute Lymphocytes 2.2      Absolute Monocytes 0.7      Absolute Eosinophils 0.0      Absolute Basophils 0.0      Absolute Immature Granulocytes 0.1      Absolute NRBCs 0.0     ETHANOL LEVEL BLOOD - Normal    Ethanol Level Blood <0.01     LIPASE - Normal    Lipase 16          Soft tissue neck CT w contrast   Final Result   IMPRESSION:    1.  No cervical mass, lymphadenopathy or localized inflammatory process.             Treatments provided: see MAR  Family Comments: mother at bedside  OBS brochure/video discussed/provided to patient:  N/A  ED Medications:   Medications   diazepam (VALIUM) injection 5 mg (5 mg Intravenous $Given 6/18/25 9604)   sodium chloride 0.9% BOLUS 1,000 mL (0 mLs  Intravenous Stopped 6/18/25 1130)   iopamidol (ISOVUE-370) solution 500 mL (90 mLs Intravenous $Given 6/18/25 1012)   sodium chloride 0.9 % bag for CT scan flush (65 mLs As instructed $Given 6/18/25 1012)   magnesium sulfate 2 g in 50 mL sterile water intermittent infusion (0 g Intravenous Stopped 6/18/25 1129)   prochlorperazine (COMPAZINE) injection 10 mg (10 mg Intravenous $Given 6/18/25 1153)   diazepam (VALIUM) injection 5 mg (5 mg Intravenous $Given 6/18/25 1155)   potassium chloride (KLOR-CON) Packet 40 mEq (40 mEq Oral $Given 6/18/25 1354)   diazepam (VALIUM) injection 5 mg (5 mg Intravenous $Given 6/18/25 1422)       Drips infusing:  No  For the majority of the shift this patient was Green.   Interventions performed were NA.    Sepsis treatment initiated: No    Cares/treatment/interventions/medications to be completed following ED care: see POC    ED Nurse Name: Marlys Cardozo RN  2:54 PM RECEIVING UNIT ED HANDOFF REVIEW    Above ED Nurse Handoff Report was reviewed: Yes  Reviewed by: Karol Linda RN on June 18, 2025 at 3:41 PM   BERNADETTE Ferrara called the ED to inform them the note was read: Yes

## 2025-06-18 NOTE — ED PROVIDER NOTES
Emergency Department Note      History of Present Illness     Chief Complaint   Alcohol Problem    HPI   Tati Hamilton is a 44 year old female with a history of MS and hypertension presenting with with alcohol withdrawal and jaw pain. The patient last consumed alcohol last night at 1900. She endorses persistent vomiting since. She took Tylenol this morning, but vomited shortly after. She has a history of withdrawal with no history of withdrawal seizures. Sharee has been admitted for alcohol withdrawal in the past. The patient also has been experiencing constant right sided jaw pain for the past three weeks. She was evaluated by a dentist two days ago (6/16/25), who noted it was not a dental issue following imaging. Her severity of pain is unchanged throughout the day. No recent falls or trauma. No cough or rhinorrhea.     Independent Historian   None. Mother present at bedside.     Review of External Notes   Reviewed ED record and detox record from September of last year.     Past Medical History     Medical History and Problem List   Anxiety  Calculus of kidney  Depressive disorder  ESTEPHANIA  Hypertension   Lung nodules  Multiple sclerosis   PONV   Alcohol hepatitis   GERD  CHARLES  Osteopenia   Steatohepatitis      Medications   Buspirone   Famotidine   Hydroxyzine   Lisinopril  Lorazepam   Metoprolol      Surgical History   Upper vagina W loop  Tonsillectomy   Cholecystectomy  2 procedures for kidney stones    Physical Exam     Patient Vitals for the past 24 hrs:   BP Temp Temp src Pulse Resp SpO2 Height Weight   06/18/25 1333 (!) 143/86 -- -- 104 -- 98 % -- --   06/18/25 1318 (!) 170/109 -- -- 116 -- 97 % -- --   06/18/25 1303 (!) 155/107 -- -- 98 -- 95 % -- --   06/18/25 1248 (!) 152/104 -- -- 97 -- 96 % -- --   06/18/25 1233 (!) 147/99 -- -- 105 -- 96 % -- --   06/18/25 1218 (!) 142/89 -- -- 97 -- 93 % -- --   06/18/25 1200 (!) 149/94 -- -- 98 -- 95 % -- --   06/18/25 1100 (!) 157/106 -- -- 103 -- 99 % -- --  "  06/18/25 1000 (!) 138/94 -- -- 83 -- 95 % -- --   06/18/25 0843 (!) 143/103 97.7  F (36.5  C) Oral 106 20 99 % 1.702 m (5' 7\") 73.5 kg (162 lb 0.6 oz)     Physical Exam  Nursing note and vitals reviewed.  HENT:   Mouth/Throat: Moist mucous membranes.   Eyes: EOMI, nonicteric sclera  Cardiovascular: tachycardic, regular rhythm, no murmur  Pulmonary/Chest: Effort normal and breath sounds normal. No respiratory distress. No wheezes. No rales.   Abdominal: Soft. Nontender, nondistended, no guarding or rigidity.   Musculoskeletal: Normal range of motion.   Neurological: Alert. Tremulous. Tongue fasciculations noted. Moves all extremities spontaneously.   Skin: Skin is warm and dry. No rash noted.         Diagnostics     Lab Results   Labs Ordered and Resulted from Time of ED Arrival to Time of ED Departure   COMPREHENSIVE METABOLIC PANEL - Abnormal       Result Value    Sodium 133 (*)     Potassium 3.2 (*)     Carbon Dioxide (CO2) 20 (*)     Anion Gap 22 (*)     Urea Nitrogen 11.7      Creatinine 0.58      GFR Estimate >90      Calcium 9.1      Chloride 91 (*)     Glucose 132 (*)     Alkaline Phosphatase 104      AST 41      ALT 33      Protein Total 7.1      Albumin 4.2      Bilirubin Total 1.9 (*)    MAGNESIUM - Abnormal    Magnesium 0.9 (*)    CBC WITH PLATELETS AND DIFFERENTIAL - Abnormal    WBC Count 13.2 (*)     RBC Count 4.07      Hemoglobin 13.5      Hematocrit 36.6      MCV 90      MCH 33.2 (*)     MCHC 36.9 (*)     RDW 13.2      Platelet Count 239      % Neutrophils 78      % Lymphocytes 16      % Monocytes 5      % Eosinophils 0      % Basophils 0      % Immature Granulocytes 0      NRBCs per 100 WBC 0      Absolute Neutrophils 10.3 (*)     Absolute Lymphocytes 2.2      Absolute Monocytes 0.7      Absolute Eosinophils 0.0      Absolute Basophils 0.0      Absolute Immature Granulocytes 0.1      Absolute NRBCs 0.0     ETHANOL LEVEL BLOOD - Normal    Ethanol Level Blood <0.01     LIPASE - Normal    Lipase 16   "       Imaging   Soft tissue neck CT w contrast   Final Result   IMPRESSION:    1.  No cervical mass, lymphadenopathy or localized inflammatory process.             EKG   ECG results from 06/18/25   EKG 12 lead     Value    Systolic Blood Pressure     Diastolic Blood Pressure     Ventricular Rate 91    Atrial Rate 91    MO Interval 120    QRS Duration 88        QTc 501    P Axis 51    R AXIS 65    T Axis 53    Interpretation ECG      Sinus rhythm  Prolonged QT  Abnormal ECG  When compared with ECG of 14-Sep-2024 21:45,  QT has lengthened             Independent Interpretation   None    ED Course      Medications Administered   Medications   diazepam (VALIUM) injection 5 mg (5 mg Intravenous $Given 6/18/25 0949)   sodium chloride 0.9% BOLUS 1,000 mL (0 mLs Intravenous Stopped 6/18/25 1130)   iopamidol (ISOVUE-370) solution 500 mL (90 mLs Intravenous $Given 6/18/25 1012)   sodium chloride 0.9 % bag for CT scan flush (65 mLs As instructed $Given 6/18/25 1012)   magnesium sulfate 2 g in 50 mL sterile water intermittent infusion (0 g Intravenous Stopped 6/18/25 1129)   prochlorperazine (COMPAZINE) injection 10 mg (10 mg Intravenous $Given 6/18/25 1153)   diazepam (VALIUM) injection 5 mg (5 mg Intravenous $Given 6/18/25 1155)   potassium chloride (KLOR-CON) Packet 40 mEq (40 mEq Oral $Given 6/18/25 1354)   diazepam (VALIUM) injection 5 mg (5 mg Intravenous $Given 6/18/25 1422)       Procedures   Procedures     Discussion of Management   Hospitalist MITUL Ortiz.     Additional Documentation  None    Medical Decision Making / Diagnosis     CMS Diagnoses: None    MIPS   None          [facility]   Tati Hamilton is a 44 year old female who presents with chief complaint nausea, vomiting, and concern for alcohol withdrawal.  Patient has history of alcohol abuse and has gone through withdrawal in the past.  No history of withdrawal seizures.  She declines any interest in detox.  Her initial plan was for hydration and help with  her symptoms with strong preference for discharge.  Despite treatment with fluids, antiemetics, and multiple doses of IV Valium, patient still with ongoing significant withdrawal symptoms.  She is having ongoing vomiting as well.  Multiple laboratory derangements noted including low mag, low potassium, elevated gap strongly suggestive of ketosis likely alcohol versus starvation.  Discussed with patient and mother that clearly she is not going to be able to detox at home given she cannot tolerate any p.o. intake and has needed multiple doses of IV medications.  Patient reluctant to be admitted, but is in agreement.  Discussed with hospitalist PA who accepts patient for admission.    Disposition   The patient was admitted to the hospital.     Diagnosis     ICD-10-CM    1. Alcohol withdrawal, uncomplicated (H)  F10.930       2. Hypomagnesemia  E83.42       3. QT prolongation  R94.31       4. Nausea and vomiting, unspecified vomiting type  R11.2       5. Alcoholic ketosis (H)  E88.89       6. Hypokalemia  E87.6              Scribe Disclosure:  Sona FLEMING, am serving as a scribe at 8:47 AM on 6/18/2025 to document services personally performed by Alan Rivera MD based on my observations and the provider's statements to me.        Alan Rivera MD  06/18/25 5111

## 2025-06-19 VITALS
HEIGHT: 67 IN | DIASTOLIC BLOOD PRESSURE: 93 MMHG | HEART RATE: 74 BPM | TEMPERATURE: 97.1 F | SYSTOLIC BLOOD PRESSURE: 136 MMHG | RESPIRATION RATE: 18 BRPM | OXYGEN SATURATION: 99 % | BODY MASS INDEX: 25.47 KG/M2 | WEIGHT: 162.3 LBS

## 2025-06-19 LAB
ALBUMIN SERPL BCG-MCNC: 3.5 G/DL (ref 3.5–5.2)
ALP SERPL-CCNC: 92 U/L (ref 40–150)
ALT SERPL W P-5'-P-CCNC: 24 U/L (ref 0–50)
ANION GAP SERPL CALCULATED.3IONS-SCNC: 8 MMOL/L (ref 7–15)
AST SERPL W P-5'-P-CCNC: 30 U/L (ref 0–45)
BILIRUB SERPL-MCNC: 2.9 MG/DL
BUN SERPL-MCNC: 7 MG/DL (ref 6–20)
CALCIUM SERPL-MCNC: 8.2 MG/DL (ref 8.8–10.4)
CHLORIDE SERPL-SCNC: 102 MMOL/L (ref 98–107)
CREAT SERPL-MCNC: 0.61 MG/DL (ref 0.51–0.95)
EGFRCR SERPLBLD CKD-EPI 2021: >90 ML/MIN/1.73M2
ERYTHROCYTE [DISTWIDTH] IN BLOOD BY AUTOMATED COUNT: 13.5 % (ref 10–15)
GLUCOSE SERPL-MCNC: 99 MG/DL (ref 70–99)
HCO3 SERPL-SCNC: 25 MMOL/L (ref 22–29)
HCT VFR BLD AUTO: 32.7 % (ref 35–47)
HGB BLD-MCNC: 11.7 G/DL (ref 11.7–15.7)
MAGNESIUM SERPL-MCNC: 1.9 MG/DL (ref 1.7–2.3)
MCH RBC QN AUTO: 33 PG (ref 26.5–33)
MCHC RBC AUTO-ENTMCNC: 35.8 G/DL (ref 31.5–36.5)
MCV RBC AUTO: 92 FL (ref 78–100)
PHOSPHATE SERPL-MCNC: 3.2 MG/DL (ref 2.5–4.5)
PLATELET # BLD AUTO: 165 10E3/UL (ref 150–450)
POTASSIUM SERPL-SCNC: 3.4 MMOL/L (ref 3.4–5.3)
PROT SERPL-MCNC: 5.6 G/DL (ref 6.4–8.3)
RBC # BLD AUTO: 3.55 10E6/UL (ref 3.8–5.2)
SODIUM SERPL-SCNC: 135 MMOL/L (ref 135–145)
WBC # BLD AUTO: 9.1 10E3/UL (ref 4–11)

## 2025-06-19 PROCEDURE — 36415 COLL VENOUS BLD VENIPUNCTURE: CPT | Performed by: PHYSICIAN ASSISTANT

## 2025-06-19 PROCEDURE — 84100 ASSAY OF PHOSPHORUS: CPT | Performed by: STUDENT IN AN ORGANIZED HEALTH CARE EDUCATION/TRAINING PROGRAM

## 2025-06-19 PROCEDURE — 83735 ASSAY OF MAGNESIUM: CPT | Performed by: STUDENT IN AN ORGANIZED HEALTH CARE EDUCATION/TRAINING PROGRAM

## 2025-06-19 PROCEDURE — 250N000013 HC RX MED GY IP 250 OP 250 PS 637: Performed by: PHYSICIAN ASSISTANT

## 2025-06-19 PROCEDURE — G0378 HOSPITAL OBSERVATION PER HR: HCPCS

## 2025-06-19 PROCEDURE — 82040 ASSAY OF SERUM ALBUMIN: CPT | Performed by: PHYSICIAN ASSISTANT

## 2025-06-19 PROCEDURE — 99238 HOSP IP/OBS DSCHRG MGMT 30/<: CPT | Performed by: STUDENT IN AN ORGANIZED HEALTH CARE EDUCATION/TRAINING PROGRAM

## 2025-06-19 PROCEDURE — 85014 HEMATOCRIT: CPT | Performed by: PHYSICIAN ASSISTANT

## 2025-06-19 RX ORDER — GABAPENTIN 300 MG/1
CAPSULE ORAL
Qty: 42 CAPSULE | Refills: 0 | Status: SHIPPED | OUTPATIENT
Start: 2025-06-19 | End: 2025-06-24

## 2025-06-19 RX ADMIN — Medication 1 TABLET: at 08:35

## 2025-06-19 RX ADMIN — THIAMINE HCL TAB 100 MG 100 MG: 100 TAB at 08:34

## 2025-06-19 RX ADMIN — GABAPENTIN 900 MG: 300 CAPSULE ORAL at 00:09

## 2025-06-19 RX ADMIN — FOLIC ACID 1 MG: 1 TABLET ORAL at 08:34

## 2025-06-19 RX ADMIN — GABAPENTIN 900 MG: 300 CAPSULE ORAL at 15:53

## 2025-06-19 RX ADMIN — LISINOPRIL 20 MG: 20 TABLET ORAL at 08:34

## 2025-06-19 RX ADMIN — GABAPENTIN 900 MG: 300 CAPSULE ORAL at 08:33

## 2025-06-19 RX ADMIN — METOPROLOL SUCCINATE 25 MG: 25 TABLET, EXTENDED RELEASE ORAL at 08:34

## 2025-06-19 ASSESSMENT — ACTIVITIES OF DAILY LIVING (ADL)
ADLS_ACUITY_SCORE: 25
ADLS_ACUITY_SCORE: 21
ADLS_ACUITY_SCORE: 25
ADLS_ACUITY_SCORE: 19
ADLS_ACUITY_SCORE: 19
ADLS_ACUITY_SCORE: 16
ADLS_ACUITY_SCORE: 25
ADLS_ACUITY_SCORE: 19
ADLS_ACUITY_SCORE: 21
ADLS_ACUITY_SCORE: 19
ADLS_ACUITY_SCORE: 16
ADLS_ACUITY_SCORE: 21
ADLS_ACUITY_SCORE: 21
ADLS_ACUITY_SCORE: 19
ADLS_ACUITY_SCORE: 19
ADLS_ACUITY_SCORE: 21

## 2025-06-19 NOTE — DISCHARGE SUMMARY
"[facility]  Hospitalist Discharge Summary      Date of Admission:  6/18/2025  Date of Discharge:  6/19/2025  Discharging Provider: Good Nuñez MD  Discharge Service: Hospitalist Service    Discharge Diagnoses     Binge alcohol drinking.  History of alcohol abuse with relapse.  Alcohol withdrawal.  Anion gap metabolic acidosis  Hypokalemia, hypomagnesemia and hyponatremia..  Right-sided jaw pain, resolved..      Clinically Significant Risk Factors     # Overweight: Estimated body mass index is 25.42 kg/m  as calculated from the following:    Height as of this encounter: 1.702 m (5' 7\").    Weight as of this encounter: 73.6 kg (162 lb 4.8 oz).       Follow-ups Needed After Discharge   Follow-up Appointments       Hospital Follow-up with Existing Primary Care Provider (PCP)          Schedule Primary Care visit within: 30 Days               Discharge Disposition   Discharged to home  Condition at discharge: Stable    Hospital Course   44-year-old with history of alcohol withdrawal disorder who was sober for several months had a 3-day history of binge drinking.  She came to the ED with nausea, vomiting, tremors and mood symptoms.  She could not tolerate p.o. and was observed in the hospital and started on CIWA protocol.  She did not need any benzodiazepines overnight for alcohol withdrawal symptoms and does not have any tremors or hallucinations now and is able to tolerate p.o.  Patient will be discharged home and will continue to follow-up with AA as she has been doing before.  Will send her with an expedited gabapentin taper.  She also had some jaw pain and had CT neck which is negative.    Consultations This Hospital Stay   None    Code Status   Full Code    Time Spent on this Encounter   I, Good Nuñez MD, personally saw the patient today and spent less than or equal to 30 minutes discharging this patient.       Good Nuñez MD  [facility]  [address]  [facility] [address]  Phone: [phone]  Fax: [phone]  ______________________________________________________________________    Physical Exam   Vital Signs: Temp: 97.1  F (36.2  C) Temp src: Temporal BP: (!) 136/93 Pulse: 74   Resp: 18 SpO2: 99 % O2 Device: None (Room air)    Weight: 162 lbs 4.8 oz  General Appearance: Alert awake and oriented x 3  Respiratory: Clear to auscultation  Cardiovascular: S1-S2 normal  GI: Soft and nontender  Skin: No rash  Other: No edema         Primary Care Physician   Gely Le    Discharge Orders      Reason for your hospital stay    Alcohol withdrawal     Activity    Your activity upon discharge: activity as tolerated     Diet    Follow this diet upon discharge: Current Diet:Orders Placed This Encounter      Regular Diet Adult     Hospital Follow-up with Existing Primary Care Provider (PCP)            Significant Results and Procedures   Most Recent 3 CBC's:  Recent Labs   Lab Test 06/19/25  0613 06/18/25  0939 04/29/25  1042   WBC 9.1 13.2* 9.6   HGB 11.7 13.5 14.3   MCV 92 90 92    239 304     Most Recent 3 BMP's:  Recent Labs   Lab Test 06/19/25  0613 06/18/25  0939 04/29/25  1042    133* 138   POTASSIUM 3.4 3.2* 4.0   CHLORIDE 102 91* 104   CO2 25 20* 25   BUN 7.0 11.7 12.8   CR 0.61 0.58 0.77   ANIONGAP 8 22* 9   MYRANDA 8.2* 9.1 9.7   GLC 99 132* 104*     Most Recent 2 LFT's:  Recent Labs   Lab Test 06/19/25  0613 06/18/25  0939   AST 30 41   ALT 24 33   ALKPHOS 92 104   BILITOTAL 2.9* 1.9*   ,   Results for orders placed or performed during the hospital encounter of 06/18/25   Soft tissue neck CT w contrast    Narrative    EXAM: CT SOFT TISSUE NECK W CONTRAST  LOCATION: [facility]  DATE: 6/18/2025    INDICATION: several week history of right sided neck pain posterior to angle of mandible  COMPARISON: None.  CONTRAST: 90mL Isovue 370  TECHNIQUE: Routine CT Soft Tissue Neck with IV contrast. Multiplanar  reformats. Dose reduction techniques were used.    FINDINGS:     MUCOSAL SPACES/SOFT TISSUES: Normal mucosal spaces of the upper aerodigestive tract. No mucosal mass or inflammation identified. Normal vocal cords and infraglottic trachea. Normal parapharyngeal space and subcutaneous soft tissues. Normal oral cavity,    spaces, and floor of mouth structures.    LYMPH NODES: No pathologic lymph nodes by size or morphology criteria.     SALIVARY GLANDS: Normal parotid and submandibular glands.    THYROID: Few small right thyroid lobe nodules, which do not require further follow-up.     VESSELS: Vascular structures of the neck are patent.    VISUALIZED INTRACRANIAL/ORBITS/SINUSES: No abnormality of the visualized intracranial compartment or orbits. Visualized paranasal sinuses and mastoid air cells are clear.    OTHER: No destructive osseous lesion. The included lung apices are clear.      Impression    IMPRESSION:   1.  No cervical mass, lymphadenopathy or localized inflammatory process.         Discharge Medications      Review of your medicines        START taking        Dose / Directions   gabapentin 300 MG capsule  Commonly known as: NEURONTIN  Used for: Alcohol use disorder      Start taking on: June 19, 2025  Take 3 capsules (900 mg) by mouth 3 times daily for 2 days, THEN 2 capsules (600 mg) 3 times daily for 2 days, THEN 1 capsule (300 mg) 3 times daily for 2 days, THEN 1 capsule (300 mg) 2 times daily for 2 days, THEN 1 capsule (300 mg) at bedtime for 2 days.  Quantity: 42 capsule  Refills: 0            CONTINUE these medicines which have NOT CHANGED        Dose / Directions   lisinopril 20 MG tablet  Commonly known as: ZESTRIL  Used for: HTN, goal below 140/90      Dose: 20 mg  Take 1 tablet (20 mg) by mouth daily. May take additional 20 mg if the blood pressure remains above 135/90  Quantity: 180 tablet  Refills: 1     LORazepam 0.5 MG tablet  Commonly known as: ATIVAN  Used for: Generalized  anxiety disorder      TAKE ONE TABLET BY MOUTH ONCE DAILY AS NEEDED FOR ANXIETY  Quantity: 30 tablet  Refills: 0     metoprolol succinate ER 25 MG 24 hr tablet  Commonly known as: TOPROL XL  Used for: Generalized anxiety disorder      Dose: 25 mg  Take 1 tablet (25 mg) by mouth daily.  Quantity: 90 tablet  Refills: 1     multivitamin w/minerals tablet  Used for: Alcohol use disorder      Dose: 1 tablet  Take 1 tablet by mouth daily.  Refills: 0     vitamin B complex with vitamin C tablet      Dose: 1 tablet  Take 1 tablet by mouth daily.  Refills: 0               Where to get your medicines        These medications were sent to [facility] [place], [place] - [address] [facility]  [address] [facility] [address]      Phone: [phone]   gabapentin 300 MG capsule       Allergies   Allergies   Allergen Reactions    No Known Drug Allergy

## 2025-06-19 NOTE — DISCHARGE INSTRUCTIONS
Gabapentin instructions  Take 3 capsules (900 mg) by mouth 3 times daily for 2 days (Breakfast, lunch, and dinner on 6/19 and 6/20)  THEN 2 capsules (600 mg) 3 times daily for 2 days, (Breakfast, lunch, and dinner 6/21 and 6/22)   THEN 1 capsule (300 mg) 3 times daily for 2 days, (Breakfast, lunch, and dinner 6/23 and 6/24)   THEN 1 capsule (300 mg) 2 times daily for 2 days, (Breakfast an dinner 6/25 and 6/26)  THEN 1 capsule (300 mg) at bedtime for 2 days. Bedtime on 6/27 and 6/28

## 2025-06-19 NOTE — PLAN OF CARE
"Goal Outcome Evaluation:      Plan of Care Reviewed With: patient    Overall Patient Progress: improvingOverall Patient Progress: improving    Outcome Evaluation: pt is alert and oriented x4, Assist of 1  with OZ and walker, RA, CIWA, No N/V,      Problem: Adult Inpatient Plan of Care  Goal: Plan of Care Review  Description: The Plan of Care Review/Shift note should be completed every shift.  The Outcome Evaluation is a brief statement about your assessment that the patient is improving, declining, or no change.  This information will be displayed automatically on your shift  note.  Outcome: Progressing  Flowsheets (Taken 6/19/2025 0508)  Outcome Evaluation: pt is alert and oriented x4, Assist of 1  with OZ and walker, CARLOS, VIANCA, No N/V,  Plan of Care Reviewed With: patient  Overall Patient Progress: improving  Goal: Patient-Specific Goal (Individualized)  Description: You can add care plan individualizations to a care plan. Examples of Individualization might be:  \"Parent requests to be called daily at 9am for status\", \"I have a hard time hearing out of my right ear\", or \"Do not touch me to wake me up as it startles  me\".  Outcome: Progressing  Goal: Absence of Hospital-Acquired Illness or Injury  Outcome: Progressing  Intervention: Identify and Manage Fall Risk  Recent Flowsheet Documentation  Taken 6/19/2025 0012 by Florecita Roach RN  Safety Promotion/Fall Prevention:   activity supervised   nonskid shoes/slippers when out of bed   clutter free environment maintained  Intervention: Prevent Skin Injury  Recent Flowsheet Documentation  Taken 6/19/2025 0012 by Florecita Roach, RN  Body Position: position changed independently  Intervention: Prevent Infection  Recent Flowsheet Documentation  Taken 6/19/2025 0012 by Florecita Roach, RN  Infection Prevention: hand hygiene promoted  Goal: Optimal Comfort and Wellbeing  Outcome: Progressing  Goal: Readiness for Transition of Care  Outcome: " Progressing     Problem: Alcohol Withdrawal  Goal: Alcohol Withdrawal Symptom Control  Outcome: Progressing  Goal: Optimal Neurologic Function  Outcome: Progressing  Goal: Readiness for Change Identified  Outcome: Progressing     Problem: Hypertension Acute  Goal: Blood Pressure Within Desired Range  Outcome: Progressing  Intervention: Normalize Blood Pressure  Recent Flowsheet Documentation  Taken 6/19/2025 0012 by Florecita Roach RN  Medication Review/Management: medications reviewed

## 2025-06-19 NOTE — PLAN OF CARE
"Goal Outcome Evaluation:      Plan of Care Reviewed With: patient    Overall Patient Progress: improvingOverall Patient Progress: improving     A&Ox4. VSS on room air. BP slightly elevated, MD aware. Tele SR. Denies pain or nausea. CIWA score 2. Tolerating regular diet. Up with SBA. Voiding. IVs removed for d/c. Plan is to discharge home this evening, mother will be coming around 4pm.    Problem: Adult Inpatient Plan of Care  Goal: Plan of Care Review  Description: The Plan of Care Review/Shift note should be completed every shift.  The Outcome Evaluation is a brief statement about your assessment that the patient is improving, declining, or no change.  This information will be displayed automatically on your shift  note.  Outcome: Progressing  Flowsheets (Taken 6/19/2025 1316)  Plan of Care Reviewed With: patient  Overall Patient Progress: improving  Goal: Patient-Specific Goal (Individualized)  Description: You can add care plan individualizations to a care plan. Examples of Individualization might be:  \"Parent requests to be called daily at 9am for status\", \"I have a hard time hearing out of my right ear\", or \"Do not touch me to wake me up as it startles  me\".  Outcome: Progressing  Goal: Absence of Hospital-Acquired Illness or Injury  Outcome: Progressing  Intervention: Identify and Manage Fall Risk  Recent Flowsheet Documentation  Taken 6/19/2025 0824 by Yue Olivera RN  Safety Promotion/Fall Prevention: safety round/check completed  Intervention: Prevent Skin Injury  Recent Flowsheet Documentation  Taken 6/19/2025 0947 by Yue Olivera RN  Body Position:   sitting up in bed   position changed independently  Taken 6/19/2025 0824 by Yue Olivera RN  Body Position:   position changed independently   side-lying  Skin Protection: adhesive use limited  Intervention: Prevent Infection  Recent Flowsheet Documentation  Taken 6/19/2025 0824 by Yue Olivera RN  Infection Prevention:   hand hygiene " promoted   rest/sleep promoted   single patient room provided  Goal: Optimal Comfort and Wellbeing  Outcome: Progressing  Goal: Readiness for Transition of Care  Outcome: Progressing     Problem: Alcohol Withdrawal  Goal: Alcohol Withdrawal Symptom Control  Outcome: Progressing  Goal: Optimal Neurologic Function  Outcome: Progressing  Goal: Readiness for Change Identified  Outcome: Progressing     Problem: Hypertension Acute  Goal: Blood Pressure Within Desired Range  Outcome: Progressing  Intervention: Normalize Blood Pressure  Recent Flowsheet Documentation  Taken 6/19/2025 4051 by Yue Olivera, RN  Medication Review/Management: medications reviewed

## 2025-06-24 ENCOUNTER — OFFICE VISIT (OUTPATIENT)
Dept: INTERNAL MEDICINE | Facility: CLINIC | Age: 45
End: 2025-06-24
Payer: COMMERCIAL

## 2025-06-24 VITALS
HEART RATE: 92 BPM | TEMPERATURE: 97.6 F | DIASTOLIC BLOOD PRESSURE: 68 MMHG | BODY MASS INDEX: 25.43 KG/M2 | RESPIRATION RATE: 16 BRPM | WEIGHT: 162 LBS | HEIGHT: 67 IN | SYSTOLIC BLOOD PRESSURE: 114 MMHG | OXYGEN SATURATION: 99 %

## 2025-06-24 DIAGNOSIS — M79.2 NEURALGIA INVOLVING SCALP: ICD-10-CM

## 2025-06-24 DIAGNOSIS — F10.90 ALCOHOL USE DISORDER: ICD-10-CM

## 2025-06-24 DIAGNOSIS — F41.1 GENERALIZED ANXIETY DISORDER: ICD-10-CM

## 2025-06-24 DIAGNOSIS — Z09 HOSPITAL DISCHARGE FOLLOW-UP: Primary | ICD-10-CM

## 2025-06-24 PROCEDURE — 3074F SYST BP LT 130 MM HG: CPT | Performed by: INTERNAL MEDICINE

## 2025-06-24 PROCEDURE — 3078F DIAST BP <80 MM HG: CPT | Performed by: INTERNAL MEDICINE

## 2025-06-24 PROCEDURE — 99213 OFFICE O/P EST LOW 20 MIN: CPT | Performed by: INTERNAL MEDICINE

## 2025-06-24 RX ORDER — GABAPENTIN 300 MG/1
600 CAPSULE ORAL 2 TIMES DAILY
Qty: 120 CAPSULE | Refills: 0 | Status: SHIPPED | OUTPATIENT
Start: 2025-06-24

## 2025-06-24 NOTE — PROGRESS NOTES
"  Patient's instructions / PLAN:                                                        Plan:  Continue Gabapentin 600 mg twice a day   2. Head and neck pain clinic referral  3. Follow up appointment June 26  4. Return to work -- possible June 30      ASSESSMENT & PLAN:                                                      (Z09) Hospital discharge follow-up  (primary encounter diagnosis)  (F10.90) Alcohol use disorder  Comment: Doing better  Plan:     (M79.2) Neuralgia involving scalp  Comment: Persistent, suspect trigeminal neuralgia  Plan: gabapentin (NEURONTIN) 300 MG capsule, Pain         Management  Referral               Chief Complaint:                                                        Hospital follow-up    SUBJECTIVE:                                                    History of present illness     Gabapentin   -- 900 mg tid was too muc  -- she takes 600 mg bid and advised to taper the dose  -- it helps her sleep      R ear pain  -- neg TMJ and dental  -- the pain 10/10, made her drink. Then vomiting, then hosp admission. Sober since  -- the pain  persists, still stabbing, but little less, maybe because of the gabapentin       Date of Admission:  6/18/2025  Date of Discharge:  6/19/2025  Discharging Provider: Good Nuñez MD  Discharge Service: Hospitalist Service     Discharge Diagnoses  Binge alcohol drinking.  History of alcohol abuse with relapse.  Alcohol withdrawal.  Anion gap metabolic acidosis  Hypokalemia, hypomagnesemia and hyponatremia..  Right-sided jaw pain, resolved..       ROS:                                                      ROS: negative for fever, chills, cough, wheezes, chest pain, shortness of breath, vomiting, abdominal pain, leg swelling     OBJECTIVE:                                                    Physical Exam :    Blood pressure 114/68, pulse 92, temperature 97.6  F (36.4  C), temperature source Tympanic, resp. rate 16, height 1.695 m (5' 6.75\"), weight 73.5 kg " (162 lb), last menstrual period 06/03/2025, SpO2 99%, not currently breastfeeding.   NAD, appears comfortable  Skin: no rashes   Chest: clear to auscultation bilaterally, good respiratory effort  Heart: S1 S2, RRR, no mgr appreciated  Abdomen: soft, not tender,   Extremities: no edema,   Neurologic: A, Ox3, no focal signs appreciated    PMHx: reviewed  Past Medical History:   Diagnosis Date    Anxiety     Calculus of kidney     Depressive disorder     Depressive disorder, not elsewhere classified     ESTEPHANIA (generalised anxiety disorder)     High risk HPV infection 3/20/15,10/17/16    Not HPV 16 or 18, HR, Not 16 or 18    History of colposcopy 08/05/2020    HTN, goal below 140/90     Lung nodules 05/2010    Needs f/u CT Nov 2010    Multiple sclerosis (H) Dx in 2001    Multiple sclerosis (H)     PONV (postoperative nausea and vomiting)     Vitamin D deficiencies       PSHx: reviewed  Past Surgical History:   Procedure Laterality Date    HC COLP CERVIX/UPPER VAGINA W LOOP ELEC BX CERVIX      HC REMOVAL OF TONSILS,<13 Y/O      Tonsils <12y.o.    LAPAROSCOPIC CHOLECYSTECTOMY  5/8/2014    Procedure: LAPAROSCOPIC CHOLECYSTECTOMY;  Surgeon: Sona Giraldo MD;  Location:  OR    [facility] NONSPECIFIC PROCEDURE      2 procedures for kidney stones        Meds: reviewed  Current Outpatient Medications   Medication Sig Dispense Refill    FLUoxetine (PROZAC) 20 MG capsule       gabapentin (NEURONTIN) 300 MG capsule Take 3 capsules (900 mg) by mouth 3 times daily for 2 days, THEN 2 capsules (600 mg) 3 times daily for 2 days, THEN 1 capsule (300 mg) 3 times daily for 2 days, THEN 1 capsule (300 mg) 2 times daily for 2 days, THEN 1 capsule (300 mg) at bedtime for 2 days. 42 capsule 0    lisinopril (ZESTRIL) 20 MG tablet Take 1 tablet (20 mg) by mouth daily. May take additional 20 mg if the blood pressure remains above 135/90 180 tablet 1    LORazepam (ATIVAN) 0.5 MG tablet TAKE ONE TABLET BY MOUTH ONCE DAILY AS NEEDED FOR ANXIETY  30 tablet 0    metoprolol succinate ER (TOPROL XL) 25 MG 24 hr tablet Take 1 tablet (25 mg) by mouth daily. 90 tablet 1    multivitamin w/minerals (THERA-VIT-M) tablet Take 1 tablet by mouth daily.      vitamin B complex with vitamin C (VITAMIN  B COMPLEX) tablet Take 1 tablet by mouth daily.         Soc Hx: reviewed  Fam Hx: reviewed      Chart documentation was completed, in part, with [employer] voice-recognition software. Even though reviewed, some grammatical, spelling, and word errors may remain.      Gely Garcia MD  Internal Medicine       Signed Electronically by: Gely Le MD

## 2025-06-24 NOTE — PATIENT INSTRUCTIONS
Plan:  Continue Gabapentin 600 mg twice a day   2. Head and neck pain clinic referral  3. Follow up appointment June 26  4. Return to work -- possible June 30

## 2025-06-24 NOTE — TELEPHONE ENCOUNTER
Patient here for appointment and didn't have the chance to ask for a refill of Metoprolol. Current prescription is at [facility] but they are only able to give 30 day supplies. Patient is requesting a 90 day supply. Prescription needs to be sent to [facility] pharmacy for patient to get 90 day supply. Patient will see Dr. Garcia again on 6/26/26 but will run out of medication before that appointment.

## 2025-06-24 NOTE — NURSING NOTE
"Chief Complaint   Patient presents with    Hospital F/U     Possible FMLA papers     initial /68   Pulse 92   Temp 97.6  F (36.4  C) (Tympanic)   Resp 16   Ht 1.695 m (5' 6.75\")   Wt 73.5 kg (162 lb)   LMP 06/03/2025 (Exact Date)   SpO2 99%   BMI 25.56 kg/m   Estimated body mass index is 25.56 kg/m  as calculated from the following:    Height as of this encounter: 1.695 m (5' 6.75\").    Weight as of this encounter: 73.5 kg (162 lb)..  bp completed using cuff size large  DENG MONROY LPN  "

## 2025-06-25 ENCOUNTER — PATIENT OUTREACH (OUTPATIENT)
Dept: CARE COORDINATION | Facility: CLINIC | Age: 45
End: 2025-06-25
Payer: COMMERCIAL

## 2025-06-26 ENCOUNTER — OFFICE VISIT (OUTPATIENT)
Dept: INTERNAL MEDICINE | Facility: CLINIC | Age: 45
End: 2025-06-26
Payer: COMMERCIAL

## 2025-06-26 ENCOUNTER — TELEPHONE (OUTPATIENT)
Dept: INTERNAL MEDICINE | Facility: CLINIC | Age: 45
End: 2025-06-26

## 2025-06-26 VITALS
SYSTOLIC BLOOD PRESSURE: 102 MMHG | BODY MASS INDEX: 25.56 KG/M2 | DIASTOLIC BLOOD PRESSURE: 60 MMHG | WEIGHT: 162 LBS | HEART RATE: 84 BPM | TEMPERATURE: 98 F | OXYGEN SATURATION: 97 %

## 2025-06-26 DIAGNOSIS — Z02.9 ADMINISTRATIVE ENCOUNTER: ICD-10-CM

## 2025-06-26 DIAGNOSIS — M79.2 NEURALGIA INVOLVING SCALP: ICD-10-CM

## 2025-06-26 DIAGNOSIS — H92.01 RIGHT EAR PAIN: ICD-10-CM

## 2025-06-26 DIAGNOSIS — F41.1 GENERALIZED ANXIETY DISORDER: Primary | ICD-10-CM

## 2025-06-26 DIAGNOSIS — E55.9 VITAMIN D DEFICIENCY: ICD-10-CM

## 2025-06-26 RX ORDER — METOPROLOL SUCCINATE 25 MG/1
25 TABLET, EXTENDED RELEASE ORAL DAILY
Qty: 90 TABLET | Refills: 3 | Status: SHIPPED | OUTPATIENT
Start: 2025-06-26

## 2025-06-26 NOTE — TELEPHONE ENCOUNTER
Dr Garcia filled out fmla forms at the OFFICE VISIT today. Paperwork signed and faxed.  Copy sent with patient and copied to chart.

## 2025-06-26 NOTE — PROGRESS NOTES
Patient's instructions / PLAN:                                                        Plan:  Keep appointment with [facility] pain clinic today       ASSESSMENT & PLAN:                                                      (F41.1) Generalized anxiety disorder  (primary encounter diagnosis)  Comment: Controlled  but flres up with intense pain   Plan: FLUoxetine (PROZAC) 20 MG capsule            (M79.2) Neuralgia involving scalp  (H92.01) Right ear pain  Comment: gabapentin helps, but she feels drowsy and she plans to stop it when she goes back to work on Monday, June 30  Plan:     (E55.9) Vitamin D deficiency  Comment:   Plan: vitamin D3 (CHOLECALCIFEROL) 1.25 MG (27917 UT)        capsule            (Z02.9) Administrative encounter  Comment: most of our time today was spent filling out the forms for FMLA and disability   Plan:          Chief Complaint:                                                            SUBJECTIVE:                                                    History of present illness           June 24: Plan:  Continue Gabapentin 600 mg twice a day   2. Head and neck pain clinic referral  3. Follow up appointment June 26  4. Return to work -- possible June 30    ROS:                                                      ROS: negative for fever, chills, cough, wheezes, chest pain, shortness of breath, vomiting, abdominal pain, leg swelling     OBJECTIVE:                                                    Physical Exam :    Blood pressure 102/60, pulse 84, temperature 98  F (36.7  C), weight 73.5 kg (162 lb), last menstrual period 06/03/2025, SpO2 97%, not currently breastfeeding.   NAD, appears comfortable  Skin: no rashes   Neurologic: A, Ox3, no focal signs appreciated    PMHx: reviewed  Past Medical History:   Diagnosis Date    Anxiety     Calculus of kidney     Depressive disorder     Depressive disorder, not elsewhere classified     ESTEPHANIA (generalised anxiety disorder)     High risk HPV infection  3/20/15,10/17/16    Not HPV 16 or 18, HR, Not 16 or 18    History of colposcopy 08/05/2020    HTN, goal below 140/90     Lung nodules 05/2010    Needs f/u CT Nov 2010    Multiple sclerosis (H) Dx in 2001    Multiple sclerosis (H)     PONV (postoperative nausea and vomiting)     Vitamin D deficiencies       PSHx: reviewed  Past Surgical History:   Procedure Laterality Date    HC COLP CERVIX/UPPER VAGINA W LOOP ELEC BX CERVIX      HC REMOVAL OF TONSILS,<11 Y/O      Tonsils <12y.o.    LAPAROSCOPIC CHOLECYSTECTOMY  5/8/2014    Procedure: LAPAROSCOPIC CHOLECYSTECTOMY;  Surgeon: Sona Giraldo MD;  Location:  OR    [facility] NONSPECIFIC PROCEDURE      2 procedures for kidney stones        Meds: reviewed  Current Outpatient Medications   Medication Sig Dispense Refill    FLUoxetine (PROZAC) 20 MG capsule       gabapentin (NEURONTIN) 300 MG capsule Take 2 capsules (600 mg) by mouth 2 times daily. 120 capsule 0    lisinopril (ZESTRIL) 20 MG tablet Take 1 tablet (20 mg) by mouth daily. May take additional 20 mg if the blood pressure remains above 135/90 180 tablet 1    LORazepam (ATIVAN) 0.5 MG tablet TAKE ONE TABLET BY MOUTH ONCE DAILY AS NEEDED FOR ANXIETY 30 tablet 0    metoprolol succinate ER (TOPROL XL) 25 MG 24 hr tablet Take 1 tablet (25 mg) by mouth daily. 90 tablet 3    multivitamin w/minerals (THERA-VIT-M) tablet Take 1 tablet by mouth daily.      vitamin B complex with vitamin C (VITAMIN  B COMPLEX) tablet Take 1 tablet by mouth daily.         Soc Hx: reviewed  Fam Hx: reviewed      Chart documentation was completed, in part, with [employer] voice-recognition software. Even though reviewed, some grammatical, spelling, and word errors may remain.      Gely Garcia MD  Internal Medicine      Answers submitted by the patient for this visit:  Questionnaire about: Chronic problems general questions HPI Form (Submitted on 6/26/2025)  Chief Complaint: Chronic problems general questions HPI Form     2 seconds or less

## 2025-07-16 ENCOUNTER — TRANSFERRED RECORDS (OUTPATIENT)
Dept: HEALTH INFORMATION MANAGEMENT | Facility: CLINIC | Age: 45
End: 2025-07-16
Payer: COMMERCIAL

## 2025-07-31 ENCOUNTER — TELEPHONE (OUTPATIENT)
Dept: SLEEP MEDICINE | Facility: CLINIC | Age: 45
End: 2025-07-31
Payer: COMMERCIAL

## 2025-07-31 NOTE — TELEPHONE ENCOUNTER
[facility] is requesting orders for oral appliance which have been faxed to clinic. Patient has not been seen since 2/9/23 and was ordered CPAP for severe obstructive apnea. Please advise if this is an option or she needs appointment to be seen. Notes from [facility] are in chart

## 2025-08-07 ENCOUNTER — HOSPITAL ENCOUNTER (OUTPATIENT)
Facility: CLINIC | Age: 45
Setting detail: OBSERVATION
End: 2025-08-07
Attending: EMERGENCY MEDICINE | Admitting: INTERNAL MEDICINE
Payer: COMMERCIAL

## 2025-08-07 PROBLEM — R94.31 PROLONGED Q-T INTERVAL ON ECG: Status: ACTIVE | Noted: 2025-08-07

## 2025-08-07 ASSESSMENT — ACTIVITIES OF DAILY LIVING (ADL)
ADLS_ACUITY_SCORE: 24
ADLS_ACUITY_SCORE: 49
ADLS_ACUITY_SCORE: 23
ADLS_ACUITY_SCORE: 24
ADLS_ACUITY_SCORE: 49
ADLS_ACUITY_SCORE: 49
ADLS_ACUITY_SCORE: 23
ADLS_ACUITY_SCORE: 49
ADLS_ACUITY_SCORE: 24
ADLS_ACUITY_SCORE: 49
ADLS_ACUITY_SCORE: 24
ADLS_ACUITY_SCORE: 49
ADLS_ACUITY_SCORE: 23
ADLS_ACUITY_SCORE: 24

## 2025-08-07 ASSESSMENT — LIFESTYLE VARIABLES
AUDITORY DISTURBANCES: NOT PRESENT
ORIENTATION AND CLOUDING OF SENSORIUM: ORIENTED AND CAN DO SERIAL ADDITIONS
NAUSEA AND VOMITING: INTERMITTENT NAUSEA WITH DRY HEAVES
TREMOR: NO TREMOR
AGITATION: NORMAL ACTIVITY
TOTAL SCORE: 6
PAROXYSMAL SWEATS: NO SWEAT VISIBLE
ANXIETY: MILDLY ANXIOUS
VISUAL DISTURBANCES: NOT PRESENT
PAROXYSMAL SWEATS: NO SWEAT VISIBLE
NAUSEA AND VOMITING: INTERMITTENT NAUSEA WITH DRY HEAVES
HEADACHE, FULLNESS IN HEAD: NOT PRESENT
VISUAL DISTURBANCES: NOT PRESENT
TACTILE DISTURBANCES: VERY MILD ITCHING, PINS AND NEEDLES, BURNING OR NUMBNESS
ORIENTATION AND CLOUDING OF SENSORIUM: ORIENTED AND CAN DO SERIAL ADDITIONS
HEADACHE, FULLNESS IN HEAD: NOT PRESENT
TOTAL SCORE: 6
TREMOR: NOT VISIBLE, BUT CAN BE FELT FINGERTIP TO FINGERTIP
AGITATION: NORMAL ACTIVITY
ANXIETY: MILDLY ANXIOUS
AUDITORY DISTURBANCES: NOT PRESENT

## 2025-08-08 ASSESSMENT — ACTIVITIES OF DAILY LIVING (ADL)
ADLS_ACUITY_SCORE: 27
ADLS_ACUITY_SCORE: 24
ADLS_ACUITY_SCORE: 27
ADLS_ACUITY_SCORE: 28
ADLS_ACUITY_SCORE: 24

## 2025-08-11 ENCOUNTER — PATIENT OUTREACH (OUTPATIENT)
Dept: CARE COORDINATION | Facility: CLINIC | Age: 45
End: 2025-08-11
Payer: COMMERCIAL